# Patient Record
Sex: MALE | Race: WHITE | Employment: UNEMPLOYED | URBAN - METROPOLITAN AREA
[De-identification: names, ages, dates, MRNs, and addresses within clinical notes are randomized per-mention and may not be internally consistent; named-entity substitution may affect disease eponyms.]

---

## 2018-09-16 ENCOUNTER — HOSPITAL ENCOUNTER (INPATIENT)
Age: 48
LOS: 3 days | Discharge: HOME | DRG: 753 | End: 2018-09-20
Attending: EMERGENCY MEDICINE | Admitting: PSYCHIATRY & NEUROLOGY
Payer: MEDICAID

## 2018-09-16 DIAGNOSIS — Z00.8 EVALUATION BY PSYCHIATRIC SERVICE REQUIRED: Primary | ICD-10-CM

## 2018-09-16 PROCEDURE — 99285 EMERGENCY DEPT VISIT HI MDM: CPT

## 2018-09-17 PROBLEM — F32.A DEPRESSION: Status: ACTIVE | Noted: 2018-09-17

## 2018-09-17 LAB
ACETAMINOPHEN LEVEL: <5 MCG/ML (ref 10–30)
ALBUMIN SERPL-MCNC: 4.8 G/DL (ref 3.5–5.2)
ALP BLD-CCNC: 60 U/L (ref 40–129)
ALT SERPL-CCNC: 35 U/L (ref 0–40)
AMPHETAMINE SCREEN, URINE: NOT DETECTED
ANION GAP SERPL CALCULATED.3IONS-SCNC: 18 MMOL/L (ref 7–16)
AST SERPL-CCNC: 21 U/L (ref 0–39)
BARBITURATE SCREEN URINE: NOT DETECTED
BENZODIAZEPINE SCREEN, URINE: NOT DETECTED
BILIRUB SERPL-MCNC: 0.4 MG/DL (ref 0–1.2)
BILIRUBIN URINE: NEGATIVE
BLOOD, URINE: NEGATIVE
BUN BLDV-MCNC: 11 MG/DL (ref 6–20)
CALCIUM SERPL-MCNC: 10.4 MG/DL (ref 8.6–10.2)
CANNABINOID SCREEN URINE: NOT DETECTED
CHLORIDE BLD-SCNC: 99 MMOL/L (ref 98–107)
CLARITY: CLEAR
CO2: 22 MMOL/L (ref 22–29)
COCAINE METABOLITE SCREEN URINE: NOT DETECTED
COLOR: YELLOW
CREAT SERPL-MCNC: 0.6 MG/DL (ref 0.7–1.2)
ETHANOL: <10 MG/DL (ref 0–0.08)
GFR AFRICAN AMERICAN: >60
GFR NON-AFRICAN AMERICAN: >60 ML/MIN/1.73
GLUCOSE BLD-MCNC: 97 MG/DL (ref 74–109)
GLUCOSE URINE: NEGATIVE MG/DL
HCT VFR BLD CALC: 48.4 % (ref 37–54)
HEMOGLOBIN: 16.3 G/DL (ref 12.5–16.5)
KETONES, URINE: NEGATIVE MG/DL
LEUKOCYTE ESTERASE, URINE: NEGATIVE
MCH RBC QN AUTO: 31.5 PG (ref 26–35)
MCHC RBC AUTO-ENTMCNC: 33.7 % (ref 32–34.5)
MCV RBC AUTO: 93.6 FL (ref 80–99.9)
METHADONE SCREEN, URINE: NOT DETECTED
NITRITE, URINE: NEGATIVE
OPIATE SCREEN URINE: NOT DETECTED
PDW BLD-RTO: 13 FL (ref 11.5–15)
PH UA: 6 (ref 5–9)
PHENCYCLIDINE SCREEN URINE: NOT DETECTED
PLATELET # BLD: 235 E9/L (ref 130–450)
PMV BLD AUTO: 11.4 FL (ref 7–12)
POTASSIUM SERPL-SCNC: 3.8 MMOL/L (ref 3.5–5)
PROPOXYPHENE SCREEN: NOT DETECTED
PROTEIN UA: NEGATIVE MG/DL
RBC # BLD: 5.17 E12/L (ref 3.8–5.8)
SALICYLATE, SERUM: <0.3 MG/DL (ref 0–30)
SODIUM BLD-SCNC: 139 MMOL/L (ref 132–146)
SPECIFIC GRAVITY UA: 1.01 (ref 1–1.03)
TOTAL PROTEIN: 7.9 G/DL (ref 6.4–8.3)
TRICYCLIC ANTIDEPRESSANTS SCREEN SERUM: NEGATIVE NG/ML
UROBILINOGEN, URINE: 0.2 E.U./DL
VALPROIC ACID LEVEL: 55 MCG/ML (ref 50–100)
WBC # BLD: 12.8 E9/L (ref 4.5–11.5)

## 2018-09-17 PROCEDURE — 36415 COLL VENOUS BLD VENIPUNCTURE: CPT

## 2018-09-17 PROCEDURE — 80053 COMPREHEN METABOLIC PANEL: CPT

## 2018-09-17 PROCEDURE — 80164 ASSAY DIPROPYLACETIC ACD TOT: CPT

## 2018-09-17 PROCEDURE — 81003 URINALYSIS AUTO W/O SCOPE: CPT

## 2018-09-17 PROCEDURE — G0480 DRUG TEST DEF 1-7 CLASSES: HCPCS

## 2018-09-17 PROCEDURE — 1240000000 HC EMOTIONAL WELLNESS R&B

## 2018-09-17 PROCEDURE — 85027 COMPLETE CBC AUTOMATED: CPT

## 2018-09-17 PROCEDURE — 99222 1ST HOSP IP/OBS MODERATE 55: CPT | Performed by: PSYCHIATRY & NEUROLOGY

## 2018-09-17 PROCEDURE — 80307 DRUG TEST PRSMV CHEM ANLYZR: CPT

## 2018-09-17 RX ORDER — HALOPERIDOL 5 MG/ML
10 INJECTION INTRAMUSCULAR EVERY 6 HOURS PRN
Status: DISCONTINUED | OUTPATIENT
Start: 2018-09-17 | End: 2018-09-20 | Stop reason: HOSPADM

## 2018-09-17 RX ORDER — ACETAMINOPHEN 325 MG/1
650 TABLET ORAL EVERY 4 HOURS PRN
Status: DISCONTINUED | OUTPATIENT
Start: 2018-09-17 | End: 2018-09-20 | Stop reason: HOSPADM

## 2018-09-17 RX ORDER — BENZTROPINE MESYLATE 1 MG/ML
2 INJECTION INTRAMUSCULAR; INTRAVENOUS 2 TIMES DAILY PRN
Status: DISCONTINUED | OUTPATIENT
Start: 2018-09-17 | End: 2018-09-20 | Stop reason: HOSPADM

## 2018-09-17 RX ORDER — HYDROXYZINE PAMOATE 50 MG/1
50 CAPSULE ORAL EVERY 6 HOURS PRN
Status: DISCONTINUED | OUTPATIENT
Start: 2018-09-17 | End: 2018-09-20 | Stop reason: HOSPADM

## 2018-09-17 RX ORDER — MAGNESIUM HYDROXIDE/ALUMINUM HYDROXICE/SIMETHICONE 120; 1200; 1200 MG/30ML; MG/30ML; MG/30ML
30 SUSPENSION ORAL PRN
Status: DISCONTINUED | OUTPATIENT
Start: 2018-09-17 | End: 2018-09-20 | Stop reason: HOSPADM

## 2018-09-17 RX ORDER — TRAZODONE HYDROCHLORIDE 50 MG/1
50 TABLET ORAL NIGHTLY PRN
Status: DISCONTINUED | OUTPATIENT
Start: 2018-09-17 | End: 2018-09-20 | Stop reason: HOSPADM

## 2018-09-17 ASSESSMENT — SLEEP AND FATIGUE QUESTIONNAIRES
SLEEP PATTERN: DISTURBED/INTERRUPTED SLEEP
RESTFUL SLEEP: NO
AVERAGE NUMBER OF SLEEP HOURS: 5
DO YOU USE A SLEEP AID: NO
DIFFICULTY ARISING: NO
DIFFICULTY FALLING ASLEEP: NO
DIFFICULTY STAYING ASLEEP: YES
DO YOU HAVE DIFFICULTY SLEEPING: YES

## 2018-09-17 ASSESSMENT — LIFESTYLE VARIABLES: HISTORY_ALCOHOL_USE: NO

## 2018-09-17 ASSESSMENT — PAIN - FUNCTIONAL ASSESSMENT: PAIN_FUNCTIONAL_ASSESSMENT: 0-10

## 2018-09-17 NOTE — ED NOTES
Patient denies pain. Alert and cooperative. Remains paranoid about why he is here, what pink slip means, why he was pink slipped. Feels he is here under false pretenses. Support offered. Denies SI, HI at this time. Denies feeling hungry. Encouraged to ask for food when hungry. Suicidal precautions maintained while in hallway.         Edda Son, RN  09/17/18 4802

## 2018-09-17 NOTE — ED NOTES
Cell phone charged and given back to patient. Patients cell phone cord placed back in locker 26 with belongings.           Josefa Longo RN  09/17/18 1988

## 2018-09-17 NOTE — ED NOTES
Notified dr Amrik Cárdenas of need for admission pt accepted to [address] [place] [address] diagnosis depression unspecified. Please ensure original pinkslip/volunary admission is sent with pts soft chart. Fior to contact primary care RN with further admission details.       Gonzella Olszewski, RN  09/17/18 2138

## 2018-09-17 NOTE — PROGRESS NOTES
`[facility] [place]  Admission Note     Admission Type:   Admission Type: Involuntary    Reason for admission:  Reason for Admission: \"Girl I had sexual encounter with put me here\"    PATIENT STRENGTHS:  Strengths: Positive Support    Patient Strengths and Limitations:  Limitations: Difficulty problem solving/relies on others to help solve problems    Addictive Behavior:   Addictive Behavior  In the past 3 months, have you felt or has someone told you that you have a problem with:  : None  Do you have a history of Chemical Use?: No  Do you have a history of Alcohol Use?: No  Do you have a history of Street Drug Abuse?: No  Histroy of Prescripton Drug Abuse?: No    Medical Problems:   History reviewed. No pertinent past medical history.     Status EXAM:  Status and Exam  Normal: No  Facial Expression: Worried  Affect: Congruent  Level of Consciousness: Alert  Mood:Normal: No  Mood: Anxious, Sad  Motor Activity:Normal: No  Motor Activity: Agitated  Interview Behavior: Cooperative  Preception: [place] to Person, [place] to Time, [place] to Place, [place] to Situation  Attention:Normal: No  Attention: Distractible  Thought Processes: Circumstantial  Thought Content:Normal: No  Thought Content: Delusions  Hallucinations: None  Delusions: Yes  Delusions: Persecution, Obsessions  Memory:Normal: No  Memory: Confabulation  Insight and Judgment: No  Insight and Judgment: Poor Judgment, Poor Insight, Unrealistic  Present Suicidal Ideation: No  Present Homicidal Ideation: No    Tobacco Screening:  Practical Counseling, on admission, ross X, if applicable and completed (first 3 are required if patient doesn't refuse):            ( )  Recognizing danger situations (included triggers and roadblocks)                    ( )  Coping skills (new ways to manage stress, exercise, relaxation techniques, changing routine, distraction)                                                           ( )  Basic information about quitting

## 2018-09-17 NOTE — ED PROVIDER NOTES
Department of Emergency Medicine   ED  Provider Note  Admit Date/RoomTime: 9/16/2018 11:39 PM  ED Room: [facility]      History of Present Illness:  9/16/18, Time: 11:36 PM         Eder Everett is a 53 y.o. male presenting to the ED for suicidal ideation, beginning today. The complaint has been constant, moderate in severity, and worsened by nothing. EMS states that the patient Was reportedly suicidal. Patient reports that the people from [facility] because they were concerned for safety and they thought he was suicidal.  Pt denies suicidal ideation. Pt reports a hx of bipolar disorder and that he takes his medications. Pt denies homicidal ideation. Pt denies cough, congestion, fever, chills, N/V/D, HA, CP, SOB, abdominal pain, back pain, neck pain, LOC, syncope, urinary symptoms, constipation, or any other symptoms. Review of Systems:   Pertinent positives and negatives are stated within HPI, all other systems reviewed and are negative.    --------------------------------------------- PAST HISTORY ---------------------------------------------  Past Medical History:  has no past medical history on file. Past Surgical History:  has no past surgical history on file. Social History:  has an unknown smoking status. He has never used smokeless tobacco.    Family History: family history is not on file. The patients home medications have been reviewed. Allergies: Zyprexa [olanzapine]      ---------------------------------------------------PHYSICAL EXAM--------------------------------------    Constitutional/General: Alert and oriented x3, well appearing, non toxic in NAD  Head: Normocephalic and atraumatic  Eyes: PERRL, EOMI. Mouth: Oropharynx clear, mucous membranes moist.   Neck: Supple. Full ROM. Respiratory: Lungs clear to auscultation bilaterally, no wheezes, rales, or rhonchi. Not in respiratory distress   Cardiovascular:  Regular rate. Regular rhythm.  No murmurs, gallops, or rubs. 2+ distal pulses  GI:  Abdomen Soft, Non tender, Non distended. No rebound, guarding, or rigidity. Musculoskeletal: Moves all extremities x 4. Warm and well perfused. Integument: skin warm and dry. No rashes. Neurologic: GCS 15, 5/5 strength. Psychiatric: affect flat denies suicidal and homicidal ideation.   -------------------------------------------------- RESULTS -------------------------------------------------  I have personally reviewed all laboratory and imaging results for this patient. Results are listed below.      LABS:  Results for orders placed or performed during the hospital encounter of 09/16/18   CBC   Result Value Ref Range    WBC 12.8 (H) 4.5 - 11.5 E9/L    RBC 5.17 3.80 - 5.80 E12/L    Hemoglobin 16.3 12.5 - 16.5 g/dL    Hematocrit 48.4 37.0 - 54.0 %    MCV 93.6 80.0 - 99.9 fL    MCH 31.5 26.0 - 35.0 pg    MCHC 33.7 32.0 - 34.5 %    RDW 13.0 11.5 - 15.0 fL    Platelets 545 484 - 005 E9/L    MPV 11.4 7.0 - 12.0 fL   Comprehensive Metabolic Panel   Result Value Ref Range    Sodium 139 132 - 146 mmol/L    Potassium 3.8 3.5 - 5.0 mmol/L    Chloride 99 98 - 107 mmol/L    CO2 22 22 - 29 mmol/L    Anion Gap 18 (H) 7 - 16 mmol/L    Glucose 97 74 - 109 mg/dL    BUN 11 6 - 20 mg/dL    CREATININE 0.6 (L) 0.7 - 1.2 mg/dL    GFR Non-African American >60 >=60 mL/min/1.73    GFR African American >60     Calcium 10.4 (H) 8.6 - 10.2 mg/dL    Total Protein 7.9 6.4 - 8.3 g/dL    Alb 4.8 3.5 - 5.2 g/dL    Total Bilirubin 0.4 0.0 - 1.2 mg/dL    Alkaline Phosphatase 60 40 - 129 U/L    ALT 35 0 - 40 U/L    AST 21 0 - 39 U/L   Serum Drug Screen   Result Value Ref Range    Ethanol Lvl <10 mg/dL    Acetaminophen Level <5.0 (L) 10.0 - 72.6 mcg/mL    Salicylate, Serum <6.1 0.0 - 30.0 mg/dL    TCA Scrn NEGATIVE Cutoff:300 ng/mL   Urine Drug Screen   Result Value Ref Range    Amphetamine Screen, Urine NOT DETECTED Negative <1000 ng/mL    Barbiturate Screen, Ur NOT DETECTED Negative < 200 ng/mL    Benzodiazepine     Counseling: The emergency provider has spoken with the patient and discussed todays results, in addition to providing specific details for the plan of care and counseling regarding the diagnosis and prognosis. Questions are answered at this time and they are agreeable with the plan.     --------------------------------- IMPRESSION AND DISPOSITION ---------------------------------    IMPRESSION  1. Evaluation by psychiatric service required        DISPOSITION  Disposition:  to evaluate  Patient condition is stable    9/16/18, 11:36 PM.    This note is prepared by Adam Green, acting as Scribe for Zully Zamora MD.    Zully Zamora MD:  The scribe's documentation has been prepared under my direction and personally reviewed by me in its entirety.   I confirm that the note above accurately reflects all work, treatment, procedures, and medical decision making performed by me.      patient is medically clear      Zully Zamora MD  09/17/18 0000              Zully Zamora MD  09/17/18 [id number]

## 2018-09-18 PROBLEM — F29 PSYCHOSIS (HCC): Status: ACTIVE | Noted: 2018-09-18

## 2018-09-18 PROCEDURE — 6360000002 HC RX W HCPCS: Performed by: NURSE PRACTITIONER

## 2018-09-18 PROCEDURE — 1240000000 HC EMOTIONAL WELLNESS R&B

## 2018-09-18 PROCEDURE — 6370000000 HC RX 637 (ALT 250 FOR IP): Performed by: FAMILY MEDICINE

## 2018-09-18 PROCEDURE — 6370000000 HC RX 637 (ALT 250 FOR IP): Performed by: NURSE PRACTITIONER

## 2018-09-18 RX ORDER — CETIRIZINE HYDROCHLORIDE 10 MG/1
10 TABLET ORAL DAILY
Status: DISCONTINUED | OUTPATIENT
Start: 2018-09-18 | End: 2018-09-20 | Stop reason: HOSPADM

## 2018-09-18 RX ORDER — PANTOPRAZOLE SODIUM 40 MG/1
40 TABLET, DELAYED RELEASE ORAL
Status: DISCONTINUED | OUTPATIENT
Start: 2018-09-19 | End: 2018-09-20 | Stop reason: HOSPADM

## 2018-09-18 RX ORDER — FAMOTIDINE 20 MG/1
20 TABLET, FILM COATED ORAL DAILY
Status: DISCONTINUED | OUTPATIENT
Start: 2018-09-18 | End: 2018-09-20 | Stop reason: HOSPADM

## 2018-09-18 RX ORDER — TAMSULOSIN HYDROCHLORIDE 0.4 MG/1
0.4 CAPSULE ORAL DAILY
Status: DISCONTINUED | OUTPATIENT
Start: 2018-09-18 | End: 2018-09-20 | Stop reason: HOSPADM

## 2018-09-18 RX ORDER — TAMSULOSIN HYDROCHLORIDE 0.4 MG/1
0.4 CAPSULE ORAL DAILY
COMMUNITY
End: 2019-08-07 | Stop reason: SDUPTHER

## 2018-09-18 RX ORDER — RANITIDINE 150 MG/1
150 TABLET ORAL 2 TIMES DAILY
COMMUNITY
End: 2019-08-07 | Stop reason: SDUPTHER

## 2018-09-18 RX ORDER — OMEPRAZOLE 20 MG/1
20 CAPSULE, DELAYED RELEASE ORAL 2 TIMES DAILY
COMMUNITY
End: 2019-08-07 | Stop reason: SDUPTHER

## 2018-09-18 RX ORDER — LORATADINE 10 MG/1
10 TABLET ORAL DAILY
COMMUNITY
End: 2019-12-11 | Stop reason: SDUPTHER

## 2018-09-18 RX ORDER — DIVALPROEX SODIUM 250 MG/1
750 TABLET, DELAYED RELEASE ORAL 2 TIMES DAILY
Status: DISCONTINUED | OUTPATIENT
Start: 2018-09-18 | End: 2018-09-20 | Stop reason: HOSPADM

## 2018-09-18 RX ORDER — LISINOPRIL 20 MG/1
20 TABLET ORAL DAILY
COMMUNITY
End: 2019-08-07 | Stop reason: SDUPTHER

## 2018-09-18 RX ORDER — HYDROXYZINE PAMOATE 25 MG/1
50 CAPSULE ORAL 2 TIMES DAILY
COMMUNITY
End: 2019-12-11 | Stop reason: ALTCHOICE

## 2018-09-18 RX ORDER — BENZTROPINE MESYLATE 1 MG/1
1 TABLET ORAL 2 TIMES DAILY
Status: DISCONTINUED | OUTPATIENT
Start: 2018-09-18 | End: 2018-09-20 | Stop reason: HOSPADM

## 2018-09-18 RX ORDER — QUETIAPINE FUMARATE 300 MG/1
600 TABLET, FILM COATED ORAL NIGHTLY
Status: ON HOLD | COMMUNITY
End: 2018-09-20 | Stop reason: HOSPADM

## 2018-09-18 RX ORDER — LISINOPRIL 20 MG/1
20 TABLET ORAL DAILY
Status: DISCONTINUED | OUTPATIENT
Start: 2018-09-18 | End: 2018-09-20 | Stop reason: HOSPADM

## 2018-09-18 RX ORDER — QUETIAPINE FUMARATE 300 MG/1
600 TABLET, FILM COATED ORAL NIGHTLY
Status: DISCONTINUED | OUTPATIENT
Start: 2018-09-18 | End: 2018-09-20 | Stop reason: HOSPADM

## 2018-09-18 RX ORDER — BENZTROPINE MESYLATE 1 MG/1
1 TABLET ORAL 2 TIMES DAILY
COMMUNITY

## 2018-09-18 RX ORDER — DIVALPROEX SODIUM 250 MG/1
750 TABLET, DELAYED RELEASE ORAL 2 TIMES DAILY
Status: ON HOLD | COMMUNITY
End: 2018-09-20 | Stop reason: HOSPADM

## 2018-09-18 RX ADMIN — BENZTROPINE MESYLATE 1 MG: 1 TABLET ORAL at 20:58

## 2018-09-18 RX ADMIN — FAMOTIDINE 20 MG: 20 TABLET ORAL at 17:48

## 2018-09-18 RX ADMIN — MUPIROCIN: 20 OINTMENT TOPICAL at 21:06

## 2018-09-18 RX ADMIN — TAMSULOSIN HYDROCHLORIDE 0.4 MG: 0.4 CAPSULE ORAL at 17:48

## 2018-09-18 RX ADMIN — ACETAMINOPHEN 650 MG: 325 TABLET, FILM COATED ORAL at 20:57

## 2018-09-18 RX ADMIN — BENZTROPINE MESYLATE 2 MG: 1 INJECTION INTRAMUSCULAR; INTRAVENOUS at 13:03

## 2018-09-18 RX ADMIN — HYDROXYZINE PAMOATE 50 MG: 50 CAPSULE ORAL at 15:09

## 2018-09-18 RX ADMIN — DIVALPROEX SODIUM 750 MG: 250 TABLET, DELAYED RELEASE ORAL at 20:58

## 2018-09-18 RX ADMIN — LISINOPRIL 20 MG: 20 TABLET ORAL at 17:48

## 2018-09-18 RX ADMIN — HALOPERIDOL LACTATE 10 MG: 5 INJECTION, SOLUTION INTRAMUSCULAR at 13:03

## 2018-09-18 RX ADMIN — QUETIAPINE FUMARATE 600 MG: 300 TABLET ORAL at 20:58

## 2018-09-18 RX ADMIN — CETIRIZINE HYDROCHLORIDE 10 MG: 10 TABLET, FILM COATED ORAL at 17:48

## 2018-09-18 RX ADMIN — TRAZODONE HYDROCHLORIDE 50 MG: 50 TABLET ORAL at 20:58

## 2018-09-18 ASSESSMENT — PAIN DESCRIPTION - DESCRIPTORS: DESCRIPTORS: ACHING;DISCOMFORT;SORE

## 2018-09-18 ASSESSMENT — PAIN SCALES - GENERAL
PAINLEVEL_OUTOF10: 0
PAINLEVEL_OUTOF10: 0
PAINLEVEL_OUTOF10: 5
PAINLEVEL_OUTOF10: 0

## 2018-09-18 ASSESSMENT — PAIN DESCRIPTION - ORIENTATION: ORIENTATION: MID

## 2018-09-18 ASSESSMENT — PAIN DESCRIPTION - LOCATION: LOCATION: BACK

## 2018-09-18 ASSESSMENT — PAIN DESCRIPTION - PAIN TYPE: TYPE: ACUTE PAIN

## 2018-09-18 NOTE — CARE COORDINATION
SW was caution by nursing to wait on assessment as pt was agressive and threatening. Pt would only talk to his nurse.

## 2018-09-18 NOTE — PROGRESS NOTES
Patient verbalized to this nurse that he wants hospital advocate present this morning when he see Dr. Mook Velazquez. States, \"I have a compliant against this hospital and I have woman here that I going to nail her ass to wall. \"I have and  out of [place] also. \"

## 2018-09-18 NOTE — PLAN OF CARE
Problem: Depressive Behavior With or Without Suicide Precautions:  Goal: Absence of self-harm  Absence of self-harm   Outcome: Met This Shift  Pt resting in bed with eyes closed, no observed abnormalities. Close observations continue.

## 2018-09-18 NOTE — PROGRESS NOTES
Psychoeducation assessment was not completed as pt refused to answer questions and stated \"I am not answering any questions, talking to anyone or doing anything. \"

## 2018-09-18 NOTE — PROGRESS NOTES
Patient did not attended group as pt was pacing the floor by the nurses station and was verbally threating as to wanting discharge from hospital, irritable and paranoid.

## 2018-09-18 NOTE — H&P
Risk: [] Mild [] Moderate [x] Severe      Strengths & Weaknesses:    1. Strengths: [] Ability to communicate feelings     [] Independent ADL's     [x] Supportive Family, mom    [] Current Health Status     2. Weaknesses: [x] Emotional          [x] Motivational     MEDICATIONS: Current Facility-Administered Medications: acetaminophen (TYLENOL) tablet 650 mg, 650 mg, Oral, Q4H PRN  hydrOXYzine (VISTARIL) capsule 50 mg, 50 mg, Oral, Q6H PRN  haloperidol lactate (HALDOL) injection 10 mg, 10 mg, Intramuscular, Q6H PRN  traZODone (DESYREL) tablet 50 mg, 50 mg, Oral, Nightly PRN  benztropine mesylate (COGENTIN) injection 2 mg, 2 mg, Intramuscular, BID PRN  magnesium hydroxide (MILK OF MAGNESIA) 400 MG/5ML suspension 30 mL, 30 mL, Oral, Daily PRN  aluminum & magnesium hydroxide-simethicone (MAALOX) 200-200-20 MG/5ML suspension 30 mL, 30 mL, Oral, PRN    Medical Review of Systems:     All other than marked systmes have been reviewed and are all negative.     Constitutional Symptoms: []  fever []  Chills  Skin Symptoms: [] rash []  Pruritus   Eye Symptoms: [] Vision unchanged []  recent vision problems[] blurred vision   Respiratory Symptoms:[] shortness of breath [] cough  Cardiovascular Symptoms:  [] chest pain   [] palpitations   Gastrointestinal Symptoms: []  abdominal pain []  nausea []  vomiting []  diarrhea  Genitourinary Symptoms: []  dysuria  []  hematuria   Musculoskeletal Symptoms: []  back pain []  muscle pain []  joint pain  Neurologic Symptoms: []  headache []  dizziness  Hematolymphoid Symptoms: [] Adenopathy [] Bruises   [] Schimosis       VITALS: BP (!) 147/90   Pulse 91   Temp 98.1 °F (36.7 °C) (Temporal)   Resp 18   Ht 6' (1.829 m)   Wt 240 lb (108.9 kg)   SpO2 97%   BMI 32.55 kg/m²     ALLERGIES: Lithium; Zyprexa [olanzapine]; and Tegretol [carbamazepine]            Physical Examination:    Head:  [] Atraumatic:  [] normocephalic  Skin and Mucosa       [] Moist [] Dry [] Pale [] Normal   Neck: [] Anxious, leg and hand tremors  [x] Irritated  [] Euthymic   [x] Angry [x] Restless    Affect:  [] Congruent  [] Incongruent  [x] Labile  [x] Constricted  [x] Flat  [x] Bizarre     Thought Process and Association:  [] Logical [x] Illogical       [] Linear and Goal Directed  [x] Tangential  [] Circumstantial     Thought Content:  [] Denies [] Endorses [x] Suicidal [] Homicidal  [x] Delusional      [x] Paranoid  [] Somatic  [] Grandiose    Perception: []  None  [] Auditory   [] Visual  [] tactile   [] olfactory  [] Illusions         Insight: [] Intact  [] Fair  [x] Limited    Judgement:  [] Intact  [] Fair  [x] Limited        ASSESSMENT  Patient Active Problem List   Diagnosis    Depression     Recommendations and plan of treatment:  1- admit to inpatient unit  2- Unit [facility]   3- Medication Management I discussed risk benefits and side effects of medications. Patient is aware and willing to comply with treatment. 4- Group therapy and one on one. 5- Routine precautions  Spoke to [place] covering for guardianMaximo. Met with patient and discussed the risks and benefits associated with treatment and the patient expressed understanding. Signed:  Theresa Pimentel  9/17/2018  10:17 PM    I saw and examined the patient and I agree with the above documentation.

## 2018-09-19 PROCEDURE — 6370000000 HC RX 637 (ALT 250 FOR IP): Performed by: NURSE PRACTITIONER

## 2018-09-19 PROCEDURE — 1240000000 HC EMOTIONAL WELLNESS R&B

## 2018-09-19 PROCEDURE — 99231 SBSQ HOSP IP/OBS SF/LOW 25: CPT | Performed by: PSYCHIATRY & NEUROLOGY

## 2018-09-19 RX ADMIN — TAMSULOSIN HYDROCHLORIDE 0.4 MG: 0.4 CAPSULE ORAL at 08:55

## 2018-09-19 RX ADMIN — FAMOTIDINE 20 MG: 20 TABLET ORAL at 08:55

## 2018-09-19 RX ADMIN — DIVALPROEX SODIUM 750 MG: 250 TABLET, DELAYED RELEASE ORAL at 08:55

## 2018-09-19 RX ADMIN — BENZTROPINE MESYLATE 1 MG: 1 TABLET ORAL at 08:55

## 2018-09-19 RX ADMIN — DIVALPROEX SODIUM 750 MG: 250 TABLET, DELAYED RELEASE ORAL at 20:42

## 2018-09-19 RX ADMIN — BENZTROPINE MESYLATE 1 MG: 1 TABLET ORAL at 20:42

## 2018-09-19 RX ADMIN — HYDROXYZINE PAMOATE 50 MG: 50 CAPSULE ORAL at 08:55

## 2018-09-19 RX ADMIN — HYDROXYZINE PAMOATE 50 MG: 50 CAPSULE ORAL at 14:55

## 2018-09-19 RX ADMIN — QUETIAPINE FUMARATE 600 MG: 300 TABLET ORAL at 20:42

## 2018-09-19 RX ADMIN — LISINOPRIL 20 MG: 20 TABLET ORAL at 08:55

## 2018-09-19 RX ADMIN — CETIRIZINE HYDROCHLORIDE 10 MG: 10 TABLET, FILM COATED ORAL at 08:55

## 2018-09-19 RX ADMIN — TRAZODONE HYDROCHLORIDE 50 MG: 50 TABLET ORAL at 20:42

## 2018-09-19 RX ADMIN — MUPIROCIN: 20 OINTMENT TOPICAL at 08:59

## 2018-09-19 RX ADMIN — PANTOPRAZOLE SODIUM 40 MG: 40 TABLET, DELAYED RELEASE ORAL at 06:58

## 2018-09-19 NOTE — PROGRESS NOTES
Date: 9/19/2018  Start Time: 1100  End Time:  8513  Number of Participants:10     Type of Group: Psychotherapy     Wellness Binder Information  Module Name:  n/a  Session Number: n/a     Patient's Goal:  To increase social interaction and relationships with others.     Notes: Pt was able to provide support to group members regarding relationship with sig other and feelings of mistrust as well as acceptance.      Status After Intervention:  Unchanged     Participation Level:  Active Listener and Interactive     Participation Quality: Appropriate, Attentive, Sharing and Supportive     Speech:  normal     Thought Process/Content: Logical  Linear     Affective Functioning: Congruent     Mood: Euthymic     Level of consciousness:  Alert, Oriented x4 and Attentive     Response to Learning: Able to verbalize current knowledge/experience and Able to retain information     Endings: None Reported     Modes of Intervention: Education, Support, Socialization, Exploration, Clarifying and Problem-solving     Discipline Responsible: /Counselor

## 2018-09-19 NOTE — PROGRESS NOTES
headache []  dizziness  Hematolymphoid Symptoms: [] Adenopathy [] Bruises   [] Schimosis       Psychiatric Review of systems  Delusions:  [] Denies [] Endorses   Withdrawals:  [] Denies [] Endorses    Hallucinations: [] Denies [] Endorses    Extra Pyramidal Symptoms: [] Denies [] Endorses      /80   Pulse 81   Temp 98.2 °F (36.8 °C) (Temporal)   Resp 16   Ht 6' (1.829 m)   Wt 240 lb (108.9 kg)   SpO2 96%   BMI 32.55 kg/m²     Mental Status Examination:     Cognition:       [x] Alert  [] Awake  [x] Oriented  [x] Person  [x] Place [x] Time       [] drowsy  [] tired  [] lethargic  [] distractable  []      Attention/Concentration:   [x] Attentive  [] Distracted         Memory Recent and Remote: [] Intact   [] Impaired [] Partially Impaired      Language: [x] Able to recognize and name objects                         [] Unable to recognize and name [address] [place] [address] of Knowledge:  [] Poor [x]  Fair  [] Good     Speech: [] Normal  [] Soft  [] Slow  [x] Fast [x] Pressured                                    [] Loud [] Dysarthria  [] Incoherent        Appearance: [] Well Groomed  [] Casual Dressed  [x] Unkept  [] Disheveled                         [] Normal weight[] Thin  [] Overweight  [x] Obese           Attitude: [] Positive  [] Hostile  [] Demanding  [x] Guarded  [] Defensive                    [x] Cooperative  []  Uncooperative       Behavior:  [x] Normal Gait  [] Walks with Assistance  [] Jo-Ann Chair               [] Walks with Alessandra Douglas  [] In Hospital Bed  [] Sitting in Chair      Muscle-Skeletal:  [x] Normal Muscle Tone [] Muscle Atrophy                                        [] Abnormal Muscle Movement      Eye Contact:   [x] Good eye contact  [] Intermittent Eye Contact  [] Poor Eye Contact      Mood: [] Depressed  [x] Anxious, leg and hand tremors  [] Irritated  [] Euthymic   [] Angry [] Restless      Affect:  [] Congruent  [] Incongruent  [x] Labile  [x] Constricted  [x] Flat  [] Bizarre      Thought Process and Association:  [] Logical [] Illogical                                        [] Linear and Goal Directed  [] Tangential  [x] Circumstantial      Thought Content:  [] Denies [] Endorses [] Suicidal [] Homicidal  [x] Delusional                                       [x] Paranoid  [] Somatic  [] Grandiose      Perception: []  None  [] Auditory   [] Visual  [] tactile   [] olfactory  [] Illusions          Insight: [] Intact  [] Fair  [x] Limited    Judgement:  [] Intact  [] Fair  [x] Limited    Assessment/Plan:        Patient Active Problem List   Diagnosis Code    Depression F32.9    Psychosis ([facility] [address] F29         Plan:    []  Patient is refusing medications  [x] Improving as expected   [] Not improving as expected   [] Worsening    []  At Baseline     Continue current treatment      Reason for more than one antipsychotic:  [x] N/A  [] 3 failed monotherapy(drugs tried):  [] Cross over to a new antipsychotic  [] Taper to monotherapy from polypharmacy  [] Augmentation of Clozapine therapy due to treatment resistance to single therapy      Signed:  Stanley Valencia  9/19/2018  4:23 PM

## 2018-09-19 NOTE — CONSULTS
[facility]     Date of Admission: 9/16/2018    Chief Complaint:  Hand laceration      History Of Present Illness:      53 y.o. male who presented to [facility] presented with severe paranoia, depression and SI with a plan to jump in front of a train. . The patient denies any fever, cough, chest pain, shortness of breath, nausea and vomiting. \"Hand laceration\" sustained this AM when he was leaning against the wall and slipped  Not quite a laceration, most superficial bruising noted. ? Self inflicted. No bleeding. In clean dressing. Past Medical History:      History reviewed. No pertinent past medical history. history of schizophrenia, bipolar, paranoia, and SI. Past Surgical History:      History reviewed. No pertinent surgical history. Medications Prior to Admission:      Prior to Admission medications    Medication Sig Start Date End Date Taking?  Authorizing Provider   QUEtiapine (SEROQUEL) 300 MG tablet Take 600 mg by mouth nightly   Yes Historical Provider, MD   benztropine (COGENTIN) 1 MG tablet Take 1 mg by mouth 2 times daily   Yes Historical Provider, MD   divalproex (DEPAKOTE) 250 MG DR tablet Take 750 mg by mouth 2 times daily   Yes Historical Provider, MD   omeprazole (PRILOSEC) 20 MG delayed release capsule Take 20 mg by mouth 2 times daily   Yes Historical Provider, MD   Cholecalciferol (VITAMIN D3) 5000 units TABS Take 5,000 Units by mouth   Yes Historical Provider, MD   hydrOXYzine (VISTARIL) 25 MG capsule Take 25 mg by mouth 3 times daily as needed for Anxiety   Yes Historical Provider, MD   lisinopril (PRINIVIL;ZESTRIL) 20 MG tablet Take 20 mg by mouth daily   Yes Historical Provider, MD   ranitidine (ZANTAC) 150 MG tablet Take 150 mg by mouth daily   Yes Historical Provider, MD   tamsulosin (FLOMAX) 0.4 MG capsule Take 0.4 mg by mouth daily   Yes Historical Provider, MD   loratadine (CLARITIN) 10 MG tablet Take 10 mg by mouth daily   Yes Historical Provider, MD Results   Component Value Date    NITRU Negative 09/17/2018    WBCUA 1-3 09/22/2011    BACTERIA NONE 09/22/2011    RBCUA NONE 09/22/2011    BLOODU Negative 09/17/2018    SPECGRAV 1.015 09/17/2018    GLUCOSEU Negative 09/17/2018    GLUCOSEU NEGATIVE 09/22/2011         ASSESSMENT:    Active Hospital Problems    Diagnosis Date Noted    Psychosis ([address] Lew Franks 09/18/2018    Depression [F32.9] 09/17/2018   Abrasion of right hand. PLAN:    Mupirocin cream to hand. Dressing change every 2 days. Management as per py   Will sign off        Mary Naik MD    Thank you for the opportunity to be involved in this patient's care.

## 2018-09-20 VITALS
DIASTOLIC BLOOD PRESSURE: 86 MMHG | OXYGEN SATURATION: 95 % | HEART RATE: 68 BPM | SYSTOLIC BLOOD PRESSURE: 126 MMHG | RESPIRATION RATE: 16 BRPM | HEIGHT: 72 IN | BODY MASS INDEX: 32.51 KG/M2 | TEMPERATURE: 98.2 F | WEIGHT: 240 LBS

## 2018-09-20 PROCEDURE — 6370000000 HC RX 637 (ALT 250 FOR IP): Performed by: NURSE PRACTITIONER

## 2018-09-20 PROCEDURE — 99238 HOSP IP/OBS DSCHRG MGMT 30/<: CPT | Performed by: PSYCHIATRY & NEUROLOGY

## 2018-09-20 RX ORDER — QUETIAPINE FUMARATE 300 MG/1
600 TABLET, FILM COATED ORAL NIGHTLY
Qty: 60 TABLET | Refills: 3 | Status: SHIPPED | OUTPATIENT
Start: 2018-09-20 | End: 2019-10-15

## 2018-09-20 RX ORDER — DIVALPROEX SODIUM 250 MG/1
750 TABLET, DELAYED RELEASE ORAL 2 TIMES DAILY
Qty: 90 TABLET | Refills: 3 | Status: SHIPPED | OUTPATIENT
Start: 2018-09-20 | End: 2019-12-11

## 2018-09-20 RX ADMIN — TAMSULOSIN HYDROCHLORIDE 0.4 MG: 0.4 CAPSULE ORAL at 08:49

## 2018-09-20 RX ADMIN — LISINOPRIL 20 MG: 20 TABLET ORAL at 08:49

## 2018-09-20 RX ADMIN — HYDROXYZINE PAMOATE 50 MG: 50 CAPSULE ORAL at 08:49

## 2018-09-20 RX ADMIN — MUPIROCIN: 20 OINTMENT TOPICAL at 08:51

## 2018-09-20 RX ADMIN — PANTOPRAZOLE SODIUM 40 MG: 40 TABLET, DELAYED RELEASE ORAL at 06:48

## 2018-09-20 RX ADMIN — DIVALPROEX SODIUM 750 MG: 250 TABLET, DELAYED RELEASE ORAL at 08:49

## 2018-09-20 RX ADMIN — CETIRIZINE HYDROCHLORIDE 10 MG: 10 TABLET, FILM COATED ORAL at 08:49

## 2018-09-20 RX ADMIN — FAMOTIDINE 20 MG: 20 TABLET ORAL at 08:49

## 2018-09-20 RX ADMIN — BENZTROPINE MESYLATE 1 MG: 1 TABLET ORAL at 08:51

## 2018-09-20 NOTE — PROGRESS NOTES
[facility]  Discharge Note    Pt discharged with followings belongings:   Dentures: None  Vision - Corrective Lenses: None  Hearing Aid: None  Jewelry: None  Body Piercings Removed: N/A  Clothing: Pants, Shirt, Socks, Other (Comment), Footwear (leather belt)  Were All Patient Medications Collected?: Not Applicable  Other Valuables: Cell phone, CellTran, Other (Comment) (phone number book, pack of small cigars, wallet  contains , two lighters, watch.)   Valuables sent home with patient. Patient left department with Departure Mode: By self, In cab via Mobility at Departure: Ambulatory, discharged to Discharged to: Private Residence. Patient education on aftercare instructions: yes   Patient verbalize understanding of AVS:  yes.     Status EXAM upon discharge:  Status and Exam  Normal: Yes  Facial Expression: Brightened  Affect: Appropriate  Level of Consciousness: Alert  Mood:Normal: Yes  Mood: Anxious  Motor Activity:Normal: Yes  Motor Activity: Agitated  Interview Behavior: Cooperative  Preception: [place] to Person, Erleen Cecilia to Time, [place] to Place, [place] to Situation  Attention:Normal: Yes  Attention: Unable to Concentrate  Thought Processes:  (wnl)  Thought Content:Normal: Yes  Thought Content: Paranoia, Obsessions, Delusions  Hallucinations: None  Delusions: No  Delusions: Obsessions  Memory:Normal: No  Memory: Confabulation  Insight and Judgment: No  Insight and Judgment: Poor Judgment  Present Suicidal Ideation: No  Present Homicidal Ideation: No    Dot MIRIAM Corado

## 2018-09-20 NOTE — PLAN OF CARE
Problem: Depressive Behavior With or Without Suicide Precautions:  Goal: Able to verbalize and/or display a decrease in depressive symptoms  Able to verbalize and/or display a decrease in depressive symptoms   Outcome: Met This Shift    Goal: Ability to disclose and discuss suicidal ideas will improve  Ability to disclose and discuss suicidal ideas will improve   Outcome: Met This Shift

## 2018-09-20 NOTE — PROGRESS NOTES
In order to ensure appropriate transition and discharge planning is in place, the following documents have been transmitted to [address] [place] [address], as the outpatient provider:     · The d/c diagnosis was transmitted to the next care provider  · The reason for hospitalization was transmitted to the next care provider  · The d/c medications (dosage and indication) were transmitted to the next care provider   · The continuing care plan was transmitted to the next care provider

## 2018-09-20 NOTE — CARE COORDINATION
SW received call from pt's guardian Opal Soni at [facility] regarding pt's case, SW placed call to him and left vm. SW attempted to establish follow-up appointments with [facility] but was informed per  of note to not schedule pt follow-up appointments. SW to speak with pt's guardian regarding alternative outpatient treatment facility.

## 2018-09-20 NOTE — DISCHARGE SUMMARY
Physician Discharge Summary      Physical Examination   VS/Measurements:     /86   Pulse 68   Temp 98.2 °F (36.8 °C) (Oral)   Resp 16   Ht 6' (1.829 m)   Wt 240 lb (108.9 kg)   SpO2 95%   BMI 32.55 kg/m²         Discharge Medications:                    Medication List      CONTINUE taking these medications    benztropine 1 MG tablet  Commonly known as:  COGENTIN     divalproex 250 MG DR tablet  Commonly known as:  DEPAKOTE  Take 3 tablets by mouth 2 times daily     hydrOXYzine 25 MG capsule  Commonly known as:  VISTARIL     lisinopril 20 MG tablet  Commonly known as:  PRINIVIL;ZESTRIL     loratadine 10 MG tablet  Commonly known as:  CLARITIN     omeprazole 20 MG delayed release capsule  Commonly known as:  PRILOSEC     QUEtiapine 300 MG tablet  Commonly known as:  SEROQUEL  Take 2 tablets by mouth nightly     tamsulosin 0.4 MG capsule  Commonly known as:  FLOMAX     Vitamin D3 5000 units Tabs     ZANTAC 150 MG tablet  Generic drug:  ranitidine           Where to Get Your Medications      These medications were sent to [facility], [place] [address], [place] [address]    Phone:  [phone]   · divalproex 250 MG DR tablet  · QUEtiapine 300 MG tablet          Psychiatric: Mental Status Examination:    Cognition:      [x] Alert  [x] Awake  [x] Oriented  [x] Person  [x] Place [x] Time    [] drowsy  [] tired  [] lethargic  [] distractable       Attention/Concentration:   [] Attentive  [] Distracted        Memory Recent and Remote: [] Intact   [] Impaired [] Partially Impaired     Language: [] Able to recognize and name objects          [] Unable to recognize and name Objects    Fund of Knowledge:  [] Poor []  Fair  [] Good    Speech: [x] Normal  [] Soft  [] Slow  [] Fast [] Pressured            [] Loud [] Dysarthria  [] Incoherent       Appearance: [] Well Groomed  [x] Casual Dressed  [] Unkept  [] Disheveled          [] Normal weight[] Thin  [] Overweight  [] Obese           Attitude: [] Positive  [] Hostile  [] Demanding  [] Guarded  [] Defensive         [x] Cooperative  []  Uncooperative      Behavior:  [x] Normal Gait  [] Walks with Assistance  [] Kallie Lynn    [] Walks with Florencio Karan  [] In Hospital Bed  [] Sitting in Chair    Muscle-Skeletal:  [x] Normal Muscle Tone [] Muscle Atrophy       [] Abnormal Muscle Movement     Eye Contact:  [x] Good eye contact  [] Intermittent Eye Contact  [] Poor Eye Contact     Mood: [] Depressed  [] Anxious  [] Irritated  [x] Euthymic   [] Angry [] Restless    Affect:  [x] Congruent  [] Incongruent  [] Labile  [] Constricted  [] Flat  [] Bizarre     Thought Process and Association:  [] Logical [] Illogical       [x] Linear and Goal Directed  [] Tangential  [] Circumstantial     Thought Content:  [x] Denies [] Endorses [] Suicidal [] Homicidal  [] Delusional      [] Paranoid  [] Somatic  [] Grandiose    Perception: [x]  None  [] Auditory   [] Visual  [] tactile   [] olfactory  [] Illusions         Insight: [] Intact  [x] Fair  [] Limited    Judgement:  [] Intact  [x] Fair  [] Limited    Hospital Course:   Admit Date: 9/16/2018     Discharge Date: 9/20/2018  Admitted from:  [x]  Emergency Room  []  Home  []  Another facility   []  [facility]     Admitting diagnosis:   Patient Active Problem List   Diagnosis    Depression    Psychosis ([facility] [address]      Length of stay:  3 days              Korey Womack was admitted in Psychiatric unit  from ER with psychosis. Patient was treated            With the above . Patient responded well to the treatment.      Discharge Summary Plan:     Discharge Status:    [x] Improved [] Unchanged    [] Worse       Discharge instructions given:  [x] Patient    [] Family [] Other         Discharge disposition:  [x] Home [] Step Down unit  [] Group Home []  [facility]                                                    [] [facility]    [] AMA  [] Other

## 2018-09-20 NOTE — PROGRESS NOTES
Patient is bright and social. He reported that he is feeling better. He is calm and cooperative, he expressed that he feels that the medication is working. He denies any S/I, no A/V hallucinations.

## 2019-08-07 ENCOUNTER — HOSPITAL ENCOUNTER (OUTPATIENT)
Age: 49
Discharge: HOME | End: 2019-08-09
Payer: MEDICAID

## 2019-08-07 ENCOUNTER — TELEPHONE (OUTPATIENT)
Dept: PRIMARY CARE CLINIC | Age: 49
End: 2019-08-07

## 2019-08-07 ENCOUNTER — OFFICE VISIT (OUTPATIENT)
Dept: PRIMARY CARE CLINIC | Age: 49
End: 2019-08-07
Payer: MEDICAID

## 2019-08-07 VITALS
BODY MASS INDEX: 28.88 KG/M2 | HEIGHT: 74 IN | TEMPERATURE: 98.5 F | HEART RATE: 91 BPM | RESPIRATION RATE: 18 BRPM | WEIGHT: 225 LBS | DIASTOLIC BLOOD PRESSURE: 70 MMHG | SYSTOLIC BLOOD PRESSURE: 118 MMHG | OXYGEN SATURATION: 97 %

## 2019-08-07 DIAGNOSIS — K21.9 GASTROESOPHAGEAL REFLUX DISEASE, ESOPHAGITIS PRESENCE NOT SPECIFIED: ICD-10-CM

## 2019-08-07 DIAGNOSIS — I10 ESSENTIAL HYPERTENSION: ICD-10-CM

## 2019-08-07 DIAGNOSIS — G62.9 NEUROPATHY: ICD-10-CM

## 2019-08-07 DIAGNOSIS — F31.9 BIPOLAR AFFECTIVE DISORDER, REMISSION STATUS UNSPECIFIED (HCC): ICD-10-CM

## 2019-08-07 DIAGNOSIS — N40.0 BENIGN PROSTATIC HYPERPLASIA, UNSPECIFIED WHETHER LOWER URINARY TRACT SYMPTOMS PRESENT: ICD-10-CM

## 2019-08-07 DIAGNOSIS — I10 ESSENTIAL HYPERTENSION: Primary | ICD-10-CM

## 2019-08-07 LAB
ALBUMIN SERPL-MCNC: 4.8 G/DL (ref 3.5–5.2)
ALP BLD-CCNC: 69 U/L (ref 40–129)
ALT SERPL-CCNC: 20 U/L (ref 0–40)
ANION GAP SERPL CALCULATED.3IONS-SCNC: 15 MMOL/L (ref 7–16)
AST SERPL-CCNC: 13 U/L (ref 0–39)
BASOPHILS ABSOLUTE: 0.08 E9/L (ref 0–0.2)
BASOPHILS RELATIVE PERCENT: 0.7 % (ref 0–2)
BILIRUB SERPL-MCNC: 0.2 MG/DL (ref 0–1.2)
BUN BLDV-MCNC: 12 MG/DL (ref 6–20)
CALCIUM SERPL-MCNC: 10.2 MG/DL (ref 8.6–10.2)
CHLORIDE BLD-SCNC: 97 MMOL/L (ref 98–107)
CHOLESTEROL, TOTAL: 197 MG/DL (ref 0–199)
CO2: 27 MMOL/L (ref 22–29)
CREAT SERPL-MCNC: 0.8 MG/DL (ref 0.7–1.2)
EOSINOPHILS ABSOLUTE: 0.31 E9/L (ref 0.05–0.5)
EOSINOPHILS RELATIVE PERCENT: 2.7 % (ref 0–6)
GFR AFRICAN AMERICAN: >60
GFR NON-AFRICAN AMERICAN: >60 ML/MIN/1.73
GLUCOSE BLD-MCNC: 99 MG/DL (ref 74–99)
HCT VFR BLD CALC: 44.3 % (ref 37–54)
HDLC SERPL-MCNC: 48 MG/DL
HEMOGLOBIN: 15 G/DL (ref 12.5–16.5)
IMMATURE GRANULOCYTES #: 0.05 E9/L
IMMATURE GRANULOCYTES %: 0.4 % (ref 0–5)
LDL CHOLESTEROL CALCULATED: 125 MG/DL (ref 0–99)
LYMPHOCYTES ABSOLUTE: 4.41 E9/L (ref 1.5–4)
LYMPHOCYTES RELATIVE PERCENT: 38.9 % (ref 20–42)
MCH RBC QN AUTO: 31.9 PG (ref 26–35)
MCHC RBC AUTO-ENTMCNC: 33.9 % (ref 32–34.5)
MCV RBC AUTO: 94.3 FL (ref 80–99.9)
MONOCYTES ABSOLUTE: 0.81 E9/L (ref 0.1–0.95)
MONOCYTES RELATIVE PERCENT: 7.1 % (ref 2–12)
NEUTROPHILS ABSOLUTE: 5.68 E9/L (ref 1.8–7.3)
NEUTROPHILS RELATIVE PERCENT: 50.2 % (ref 43–80)
PDW BLD-RTO: 12.8 FL (ref 11.5–15)
PLATELET # BLD: 249 E9/L (ref 130–450)
PMV BLD AUTO: 11.4 FL (ref 7–12)
POTASSIUM SERPL-SCNC: 4.4 MMOL/L (ref 3.5–5)
RBC # BLD: 4.7 E12/L (ref 3.8–5.8)
SODIUM BLD-SCNC: 139 MMOL/L (ref 132–146)
TOTAL PROTEIN: 7.4 G/DL (ref 6.4–8.3)
TRIGL SERPL-MCNC: 122 MG/DL (ref 0–149)
TSH SERPL DL<=0.05 MIU/L-ACNC: 1.19 UIU/ML (ref 0.27–4.2)
VALPROIC ACID LEVEL: 81 MCG/ML (ref 50–100)
VITAMIN B-12: 397 PG/ML (ref 211–946)
VLDLC SERPL CALC-MCNC: 24 MG/DL
WBC # BLD: 11.3 E9/L (ref 4.5–11.5)

## 2019-08-07 PROCEDURE — 84443 ASSAY THYROID STIM HORMONE: CPT

## 2019-08-07 PROCEDURE — 85025 COMPLETE CBC W/AUTO DIFF WBC: CPT

## 2019-08-07 PROCEDURE — 82607 VITAMIN B-12: CPT

## 2019-08-07 PROCEDURE — 80053 COMPREHEN METABOLIC PANEL: CPT

## 2019-08-07 PROCEDURE — 80061 LIPID PANEL: CPT

## 2019-08-07 PROCEDURE — 80164 ASSAY DIPROPYLACETIC ACD TOT: CPT

## 2019-08-07 PROCEDURE — 99204 OFFICE O/P NEW MOD 45 MIN: CPT | Performed by: FAMILY MEDICINE

## 2019-08-07 PROCEDURE — 36415 COLL VENOUS BLD VENIPUNCTURE: CPT

## 2019-08-07 RX ORDER — LISINOPRIL 20 MG/1
20 TABLET ORAL DAILY
Qty: 90 TABLET | Refills: 2 | Status: SHIPPED | OUTPATIENT
Start: 2019-08-07 | End: 2019-08-07 | Stop reason: SDUPTHER

## 2019-08-07 RX ORDER — OMEPRAZOLE 20 MG/1
20 CAPSULE, DELAYED RELEASE ORAL 2 TIMES DAILY
Qty: 60 CAPSULE | Refills: 2 | Status: SHIPPED | OUTPATIENT
Start: 2019-08-07 | End: 2019-12-11

## 2019-08-07 RX ORDER — TAMSULOSIN HYDROCHLORIDE 0.4 MG/1
0.4 CAPSULE ORAL DAILY
Qty: 90 CAPSULE | Refills: 2 | Status: SHIPPED | OUTPATIENT
Start: 2019-08-07 | End: 2019-08-07 | Stop reason: SDUPTHER

## 2019-08-07 RX ORDER — LISINOPRIL 20 MG/1
20 TABLET ORAL DAILY
Qty: 90 TABLET | Refills: 2 | Status: SHIPPED
Start: 2019-08-07 | End: 2020-05-04 | Stop reason: SDUPTHER

## 2019-08-07 RX ORDER — GABAPENTIN 400 MG/1
400 CAPSULE ORAL 3 TIMES DAILY
COMMUNITY
End: 2019-09-04 | Stop reason: SDUPTHER

## 2019-08-07 RX ORDER — MONTELUKAST SODIUM 10 MG/1
10 TABLET ORAL NIGHTLY
Qty: 90 TABLET | Refills: 2 | Status: SHIPPED
Start: 2019-08-07 | End: 2020-05-04 | Stop reason: SDUPTHER

## 2019-08-07 RX ORDER — HALOPERIDOL 5 MG
5 TABLET ORAL DAILY
Status: ON HOLD | COMMUNITY
End: 2021-04-12

## 2019-08-07 RX ORDER — RANITIDINE 150 MG/1
150 TABLET ORAL 2 TIMES DAILY
Qty: 340 TABLET | Refills: 2 | Status: SHIPPED | OUTPATIENT
Start: 2019-08-07 | End: 2019-08-07 | Stop reason: SDUPTHER

## 2019-08-07 RX ORDER — TAMSULOSIN HYDROCHLORIDE 0.4 MG/1
0.4 CAPSULE ORAL DAILY
Qty: 90 CAPSULE | Refills: 2 | Status: SHIPPED | OUTPATIENT
Start: 2019-08-07

## 2019-08-07 RX ORDER — MONTELUKAST SODIUM 10 MG/1
10 TABLET ORAL NIGHTLY
COMMUNITY
End: 2019-08-07 | Stop reason: SDUPTHER

## 2019-08-07 RX ORDER — RANITIDINE 150 MG/1
150 TABLET ORAL 2 TIMES DAILY
Qty: 340 TABLET | Refills: 2 | Status: SHIPPED | OUTPATIENT
Start: 2019-08-07 | End: 2019-12-11 | Stop reason: ALTCHOICE

## 2019-08-07 RX ORDER — OMEPRAZOLE 20 MG/1
20 CAPSULE, DELAYED RELEASE ORAL 2 TIMES DAILY
Qty: 60 CAPSULE | Refills: 2 | Status: SHIPPED | OUTPATIENT
Start: 2019-08-07 | End: 2019-08-07 | Stop reason: SDUPTHER

## 2019-08-07 RX ORDER — MONTELUKAST SODIUM 10 MG/1
10 TABLET ORAL NIGHTLY
Qty: 90 TABLET | Refills: 2 | Status: SHIPPED | OUTPATIENT
Start: 2019-08-07 | End: 2019-08-07 | Stop reason: SDUPTHER

## 2019-08-07 NOTE — PROGRESS NOTES
2019     [facility]    : 1970 Sex: male   Age: 53 y.o. Chief Complaint   Patient presents with    Established New Doctor       HPI: This 53y.o. -year-old male Fahad Bryson today to establish as a new patient. Past medical history includes hypertension, allergies, GERD, BPH and bipolar disorder. Social history is negative for tobacco or drug use or abuse. The patient does admit to occasional alcohol use. Family history is significant for diabetes, cardiovascular disease and hypertension. Patient presents today with a complaint of numbness and tingling of both upper and lower extremities. Current medication list reviewed. The patient is tolerating all medications well without adverse events or known side effects. The patient does understand the risk and benefits of the prescribed medications. The patient is up-to-date on all age-appropriate wellness issues. ROS:   Const: Denies changes in appetite, chills, fever, night sweats and weight loss. Eyes:  Denies discharge, a recent change in visual acuity, blurred vision and double vision. ENMT: Denies discharge of the ears, hearing loss, pain of the ears. Denies nasal or sinus symptoms other than stated above. Denies mouth or throat symptoms. CV:  Denies chest pain, dyspnea on exertion, orthopnea, palpitations and PND  Resp: Denies chest pain, cough, SOB and wheezing. GI: Denies abdominal pain, constipation, diarrhea, heartburn, indigestion, nausea and vomiting. : Denies dysuria, frequency, hematuria, nocturia and urgency. Musculo: Denies arthralgias and myalgia  Skin:  Denies lesions, pruritus and rash. Neuro: Denies dizziness, lightheadedness, numbness, tingling and weakness. Psych:  Denies anxiety and depression  Endocrine: Denies anxiety and depression. Hema/Lymph: Denies hematologic symptoms  Allergy/Immuno:  Denies allergic/immunologic symptoms.   Pertinent positives reviewed and noted      Current Outpatient Medications:    gabapentin (NEURONTIN) 400 MG capsule, Take 400 mg by mouth 3 times daily. , Disp: , Rfl:     haloperidol (HALDOL) 5 MG tablet, Take 5 mg by mouth daily, Disp: , Rfl:     lisinopril (PRINIVIL;ZESTRIL) 20 MG tablet, Take 1 tablet by mouth daily, Disp: 90 tablet, Rfl: 2    omeprazole (PRILOSEC) 20 MG delayed release capsule, Take 1 capsule by mouth 2 times daily, Disp: 60 capsule, Rfl: 2    ranitidine (ZANTAC) 150 MG tablet, Take 1 tablet by mouth 2 times daily, Disp: 340 tablet, Rfl: 2    tamsulosin (FLOMAX) 0.4 MG capsule, Take 1 capsule by mouth daily, Disp: 90 capsule, Rfl: 2    montelukast (SINGULAIR) 10 MG tablet, Take 1 tablet by mouth nightly, Disp: 90 tablet, Rfl: 2    divalproex (DEPAKOTE) 250 MG DR tablet, Take 3 tablets by mouth 2 times daily (Patient taking differently: Take 500 mg by mouth 2 times daily ), Disp: 90 tablet, Rfl: 3    QUEtiapine (SEROQUEL) 300 MG tablet, Take 2 tablets by mouth nightly, Disp: 60 tablet, Rfl: 3    hydrOXYzine (VISTARIL) 25 MG capsule, Take 50 mg by mouth 2 times daily , Disp: , Rfl:     benztropine (COGENTIN) 1 MG tablet, Take 1 mg by mouth 2 times daily, Disp: , Rfl:     Cholecalciferol (VITAMIN D3) 5000 units TABS, Take 5,000 Units by mouth, Disp: , Rfl:     loratadine (CLARITIN) 10 MG tablet, Take 10 mg by mouth daily, Disp: , Rfl:     Allergies   Allergen Reactions    Lithium Swelling    Wellbutrin [Bupropion] Other (See Comments)     shaking    Zyprexa [Olanzapine]     Tegretol [Carbamazepine] Nausea And Vomiting       No past medical history on file.   Social History     Socioeconomic History    Marital status: Single     Spouse name: Not on file    Number of children: Not on file    Years of education: Not on file    Highest education level: Not on file   Occupational History    Not on file   Social Needs    Financial resource strain: Not on file    Food insecurity:     Worry: Not on file     Inability: Not on file    Transportation needs: Medical: Not on file     Non-medical: Not on file   Tobacco Use    Smoking status: Current Every Day Smoker     Packs/day: 1.50     Years: 9.00     Pack years: 13.50     Types: Cigarettes     Start date: 2/18/2000    Smokeless tobacco: Never Used   Substance and Sexual Activity    Alcohol use: Not on file    Drug use: Not on file    Sexual activity: Not on file   Lifestyle    Physical activity:     Days per week: Not on file     Minutes per session: Not on file    Stress: Not on file   Relationships    Social connections:     Talks on phone: Not on file     Gets together: Not on file     Attends Congregational service: Not on file     Active member of club or organization: Not on file     Attends meetings of clubs or organizations: Not on file     Relationship status: Not on file    Intimate partner violence:     Fear of current or ex partner: Not on file     Emotionally abused: Not on file     Physically abused: Not on file     Forced sexual activity: Not on file   Other Topics Concern    Not on file   Social History Narrative    Not on file     No past surgical history on file. No family history on file. Vitals:    08/07/19 0925   BP: 118/70   Pulse: 91   Resp: 18   Temp: 98.5 °F (36.9 °C)   SpO2: 97%   Weight: 225 lb (102.1 kg)   Height: 6' 2\" (1.88 m)        Exam: Const: Appears healthy and well developed. No signs of acute distress present. Eyes: PERRL  ENMT: Tympanic membranes are intact. Nasal mucosa intact without noted erythema Septum is in the midline. Posterior pharynx shows no exudate, irritation or redness. Neck:  Supple without adenopathy. Adequate range of motion   Resp: No rales, rhonchi, wheezes appreciated over the lungs bilaterally. CV: S1, S2 within normal limits. Regular rate and rhythm noted. Without murmur, gallop or rub. Extremities:  Pulses intact. Without noted edema. Abdomen: Positive bowel sounds.   Palpation reveals softness, with no distension, organomegaly or

## 2019-09-04 RX ORDER — GABAPENTIN 400 MG/1
400 CAPSULE ORAL 4 TIMES DAILY
Qty: 120 CAPSULE | Refills: 2 | Status: SHIPPED | OUTPATIENT
Start: 2019-09-04 | End: 2020-01-07 | Stop reason: SDUPTHER

## 2019-09-04 RX ORDER — PROPRANOLOL HYDROCHLORIDE 10 MG/1
TABLET ORAL
COMMUNITY
Start: 2019-08-21 | End: 2019-09-04 | Stop reason: SDUPTHER

## 2019-09-04 RX ORDER — PROPRANOLOL HYDROCHLORIDE 10 MG/1
10 TABLET ORAL 3 TIMES DAILY
Qty: 90 TABLET | Refills: 2 | Status: SHIPPED | OUTPATIENT
Start: 2019-09-04 | End: 2019-09-20 | Stop reason: SDUPTHER

## 2019-09-19 RX ORDER — PROPRANOLOL HYDROCHLORIDE 10 MG/1
15 TABLET ORAL 3 TIMES DAILY
Qty: 90 TABLET | Refills: 3 | Status: CANCELLED | OUTPATIENT
Start: 2019-09-19 | End: 2019-10-19

## 2019-09-20 RX ORDER — PROPRANOLOL HYDROCHLORIDE 10 MG/1
10 TABLET ORAL 3 TIMES DAILY
Qty: 90 TABLET | Refills: 2 | Status: SHIPPED | OUTPATIENT
Start: 2019-09-20 | End: 2019-10-08 | Stop reason: SDUPTHER

## 2019-10-08 RX ORDER — PROPRANOLOL HYDROCHLORIDE 10 MG/1
TABLET ORAL
Qty: 135 TABLET | Refills: 0 | Status: SHIPPED | OUTPATIENT
Start: 2019-10-08 | End: 2019-11-07 | Stop reason: SDUPTHER

## 2019-10-09 ENCOUNTER — OFFICE VISIT (OUTPATIENT)
Dept: PODIATRY | Age: 49
End: 2019-10-09
Payer: MEDICAID

## 2019-10-09 ENCOUNTER — OFFICE VISIT (OUTPATIENT)
Dept: FAMILY MEDICINE CLINIC | Age: 49
End: 2019-10-09
Payer: MEDICAID

## 2019-10-09 VITALS
HEIGHT: 72 IN | HEART RATE: 86 BPM | WEIGHT: 220 LBS | TEMPERATURE: 96.9 F | BODY MASS INDEX: 29.8 KG/M2 | OXYGEN SATURATION: 98 %

## 2019-10-09 VITALS
OXYGEN SATURATION: 97 % | HEART RATE: 86 BPM | WEIGHT: 242 LBS | TEMPERATURE: 97.8 F | BODY MASS INDEX: 32.78 KG/M2 | DIASTOLIC BLOOD PRESSURE: 88 MMHG | HEIGHT: 72 IN | SYSTOLIC BLOOD PRESSURE: 125 MMHG

## 2019-10-09 DIAGNOSIS — L84 CORNS AND CALLOSITIES: ICD-10-CM

## 2019-10-09 DIAGNOSIS — M54.31 SCIATICA OF RIGHT SIDE: Primary | ICD-10-CM

## 2019-10-09 DIAGNOSIS — L85.3 XEROSIS CUTIS: ICD-10-CM

## 2019-10-09 DIAGNOSIS — B35.1 TINEA UNGUIUM: Primary | ICD-10-CM

## 2019-10-09 DIAGNOSIS — G60.8 HEREDITARY SENSORY NEUROPATHY: ICD-10-CM

## 2019-10-09 PROCEDURE — 4004F PT TOBACCO SCREEN RCVD TLK: CPT | Performed by: PHYSICIAN ASSISTANT

## 2019-10-09 PROCEDURE — G8419 CALC BMI OUT NRM PARAM NOF/U: HCPCS | Performed by: PHYSICIAN ASSISTANT

## 2019-10-09 PROCEDURE — 4004F PT TOBACCO SCREEN RCVD TLK: CPT | Performed by: PODIATRIST

## 2019-10-09 PROCEDURE — G8417 CALC BMI ABV UP PARAM F/U: HCPCS | Performed by: PODIATRIST

## 2019-10-09 PROCEDURE — 99213 OFFICE O/P EST LOW 20 MIN: CPT | Performed by: PHYSICIAN ASSISTANT

## 2019-10-09 PROCEDURE — G8484 FLU IMMUNIZE NO ADMIN: HCPCS | Performed by: PHYSICIAN ASSISTANT

## 2019-10-09 PROCEDURE — G8427 DOCREV CUR MEDS BY ELIG CLIN: HCPCS | Performed by: PHYSICIAN ASSISTANT

## 2019-10-09 PROCEDURE — 11721 DEBRIDE NAIL 6 OR MORE: CPT | Performed by: PODIATRIST

## 2019-10-09 PROCEDURE — 99203 OFFICE O/P NEW LOW 30 MIN: CPT | Performed by: PODIATRIST

## 2019-10-09 PROCEDURE — G8484 FLU IMMUNIZE NO ADMIN: HCPCS | Performed by: PODIATRIST

## 2019-10-09 PROCEDURE — G8427 DOCREV CUR MEDS BY ELIG CLIN: HCPCS | Performed by: PODIATRIST

## 2019-10-09 PROCEDURE — 11056 PARNG/CUTG B9 HYPRKR LES 2-4: CPT | Performed by: PODIATRIST

## 2019-10-09 RX ORDER — AMMONIUM LACTATE 12 G/100G
LOTION TOPICAL
Qty: 400 G | Refills: 2 | Status: ON HOLD
Start: 2019-10-09 | End: 2021-04-12

## 2019-10-09 RX ORDER — CHOLECALCIFEROL (VITAMIN D3) 1250 MCG
CAPSULE ORAL
COMMUNITY
End: 2019-10-09

## 2019-10-09 RX ORDER — PREDNISONE 10 MG/1
TABLET ORAL
Qty: 20 TABLET | Refills: 0 | Status: SHIPPED | OUTPATIENT
Start: 2019-10-09 | End: 2019-10-17

## 2019-10-09 RX ORDER — TRAMADOL HYDROCHLORIDE 50 MG/1
50 TABLET ORAL EVERY 4 HOURS PRN
Qty: 12 TABLET | Refills: 0 | Status: SHIPPED | OUTPATIENT
Start: 2019-10-09 | End: 2019-10-12

## 2019-10-15 ENCOUNTER — OFFICE VISIT (OUTPATIENT)
Dept: PRIMARY CARE CLINIC | Age: 49
End: 2019-10-15
Payer: MEDICAID

## 2019-10-15 ENCOUNTER — TELEPHONE (OUTPATIENT)
Dept: PRIMARY CARE CLINIC | Age: 49
End: 2019-10-15

## 2019-10-15 VITALS
DIASTOLIC BLOOD PRESSURE: 64 MMHG | BODY MASS INDEX: 32.08 KG/M2 | WEIGHT: 250 LBS | OXYGEN SATURATION: 99 % | HEIGHT: 74 IN | SYSTOLIC BLOOD PRESSURE: 118 MMHG | HEART RATE: 88 BPM

## 2019-10-15 DIAGNOSIS — K21.9 GASTROESOPHAGEAL REFLUX DISEASE, ESOPHAGITIS PRESENCE NOT SPECIFIED: ICD-10-CM

## 2019-10-15 DIAGNOSIS — Z23 ENCOUNTER FOR IMMUNIZATION: ICD-10-CM

## 2019-10-15 DIAGNOSIS — I10 ESSENTIAL HYPERTENSION: Primary | ICD-10-CM

## 2019-10-15 DIAGNOSIS — N40.0 BENIGN PROSTATIC HYPERPLASIA, UNSPECIFIED WHETHER LOWER URINARY TRACT SYMPTOMS PRESENT: ICD-10-CM

## 2019-10-15 PROCEDURE — G8417 CALC BMI ABV UP PARAM F/U: HCPCS | Performed by: FAMILY MEDICINE

## 2019-10-15 PROCEDURE — 90686 IIV4 VACC NO PRSV 0.5 ML IM: CPT | Performed by: FAMILY MEDICINE

## 2019-10-15 PROCEDURE — 90471 IMMUNIZATION ADMIN: CPT | Performed by: FAMILY MEDICINE

## 2019-10-15 PROCEDURE — G8427 DOCREV CUR MEDS BY ELIG CLIN: HCPCS | Performed by: FAMILY MEDICINE

## 2019-10-15 PROCEDURE — 99214 OFFICE O/P EST MOD 30 MIN: CPT | Performed by: FAMILY MEDICINE

## 2019-10-15 PROCEDURE — G8482 FLU IMMUNIZE ORDER/ADMIN: HCPCS | Performed by: FAMILY MEDICINE

## 2019-10-15 PROCEDURE — 4004F PT TOBACCO SCREEN RCVD TLK: CPT | Performed by: FAMILY MEDICINE

## 2019-10-15 RX ORDER — ARIPIPRAZOLE LAUROXIL 1064 MG/3.9ML
1 INJECTION, SUSPENSION, EXTENDED RELEASE INTRAMUSCULAR
Refills: 0 | Status: ON HOLD | COMMUNITY
Start: 2019-10-11 | End: 2021-04-12 | Stop reason: CLARIF

## 2019-10-15 RX ORDER — QUETIAPINE FUMARATE 400 MG/1
300 TABLET, FILM COATED ORAL NIGHTLY
Refills: 0 | Status: ON HOLD | COMMUNITY
Start: 2019-10-10 | End: 2021-04-14 | Stop reason: HOSPADM

## 2019-11-07 RX ORDER — PROPRANOLOL HYDROCHLORIDE 10 MG/1
TABLET ORAL
Qty: 135 TABLET | Refills: 2 | Status: SHIPPED | OUTPATIENT
Start: 2019-11-07 | End: 2020-01-31 | Stop reason: SDUPTHER

## 2019-11-08 DIAGNOSIS — K21.9 GASTROESOPHAGEAL REFLUX DISEASE, ESOPHAGITIS PRESENCE NOT SPECIFIED: Primary | ICD-10-CM

## 2019-11-08 RX ORDER — OMEPRAZOLE 40 MG/1
40 CAPSULE, DELAYED RELEASE ORAL 2 TIMES DAILY
Qty: 30 CAPSULE | Refills: 0 | Status: SHIPPED | OUTPATIENT
Start: 2019-11-08 | End: 2019-12-05 | Stop reason: SDUPTHER

## 2019-11-08 RX ORDER — OMEPRAZOLE 40 MG/1
40 CAPSULE, DELAYED RELEASE ORAL 2 TIMES DAILY
Refills: 0 | COMMUNITY
Start: 2019-10-17 | End: 2019-11-08 | Stop reason: SDUPTHER

## 2019-11-20 ENCOUNTER — TELEPHONE (OUTPATIENT)
Dept: ADMINISTRATIVE | Age: 49
End: 2019-11-20

## 2019-11-25 ENCOUNTER — TELEPHONE (OUTPATIENT)
Dept: ADMINISTRATIVE | Age: 49
End: 2019-11-25

## 2019-11-25 NOTE — TELEPHONE ENCOUNTER
Pt's legal guardian called and requested appt. Pt complaining of having some cramping and soreness in chest x 5 days, unaware of any other symptoms. Pt is at [employer] and he will check with them if he is having any other complaints. No available appts on schedule x few weeks, advised ER if having chest pain. He will take pt to ER if having chest pain. If it is muscular pain, he will take pt to express care.

## 2019-12-05 DIAGNOSIS — K21.9 GASTROESOPHAGEAL REFLUX DISEASE, ESOPHAGITIS PRESENCE NOT SPECIFIED: ICD-10-CM

## 2019-12-05 RX ORDER — OMEPRAZOLE 40 MG/1
40 CAPSULE, DELAYED RELEASE ORAL 2 TIMES DAILY
Qty: 30 CAPSULE | Refills: 0 | Status: SHIPPED | OUTPATIENT
Start: 2019-12-05 | End: 2019-12-11 | Stop reason: DRUGHIGH

## 2019-12-05 RX ORDER — OMEPRAZOLE 20 MG/1
20 CAPSULE, DELAYED RELEASE ORAL 2 TIMES DAILY
Qty: 60 CAPSULE | Refills: 2 | Status: CANCELLED | OUTPATIENT
Start: 2019-12-05 | End: 2020-03-04

## 2019-12-11 ENCOUNTER — OFFICE VISIT (OUTPATIENT)
Dept: PRIMARY CARE CLINIC | Age: 49
End: 2019-12-11
Payer: MEDICAID

## 2019-12-11 ENCOUNTER — OFFICE VISIT (OUTPATIENT)
Dept: PODIATRY | Age: 49
End: 2019-12-11
Payer: MEDICAID

## 2019-12-11 VITALS
BODY MASS INDEX: 35.35 KG/M2 | TEMPERATURE: 97.8 F | RESPIRATION RATE: 16 BRPM | WEIGHT: 261 LBS | SYSTOLIC BLOOD PRESSURE: 120 MMHG | OXYGEN SATURATION: 98 % | HEIGHT: 72 IN | DIASTOLIC BLOOD PRESSURE: 72 MMHG | HEART RATE: 74 BPM

## 2019-12-11 DIAGNOSIS — B35.1 TINEA UNGUIUM: Primary | ICD-10-CM

## 2019-12-11 DIAGNOSIS — F33.2 SEVERE EPISODE OF RECURRENT MAJOR DEPRESSIVE DISORDER, WITHOUT PSYCHOTIC FEATURES (HCC): ICD-10-CM

## 2019-12-11 DIAGNOSIS — K21.9 GASTROESOPHAGEAL REFLUX DISEASE, ESOPHAGITIS PRESENCE NOT SPECIFIED: ICD-10-CM

## 2019-12-11 DIAGNOSIS — I10 ESSENTIAL HYPERTENSION: Primary | ICD-10-CM

## 2019-12-11 DIAGNOSIS — L84 CORNS AND CALLOSITIES: ICD-10-CM

## 2019-12-11 DIAGNOSIS — L85.3 XEROSIS CUTIS: ICD-10-CM

## 2019-12-11 DIAGNOSIS — G60.8 HEREDITARY SENSORY NEUROPATHY: ICD-10-CM

## 2019-12-11 PROCEDURE — 4004F PT TOBACCO SCREEN RCVD TLK: CPT | Performed by: FAMILY MEDICINE

## 2019-12-11 PROCEDURE — G8482 FLU IMMUNIZE ORDER/ADMIN: HCPCS | Performed by: FAMILY MEDICINE

## 2019-12-11 PROCEDURE — 99999 PR OFFICE/OUTPT VISIT,PROCEDURE ONLY: CPT | Performed by: PODIATRIST

## 2019-12-11 PROCEDURE — 99214 OFFICE O/P EST MOD 30 MIN: CPT | Performed by: FAMILY MEDICINE

## 2019-12-11 PROCEDURE — 11056 PARNG/CUTG B9 HYPRKR LES 2-4: CPT | Performed by: PODIATRIST

## 2019-12-11 PROCEDURE — G8417 CALC BMI ABV UP PARAM F/U: HCPCS | Performed by: FAMILY MEDICINE

## 2019-12-11 PROCEDURE — G8427 DOCREV CUR MEDS BY ELIG CLIN: HCPCS | Performed by: FAMILY MEDICINE

## 2019-12-11 PROCEDURE — 11721 DEBRIDE NAIL 6 OR MORE: CPT | Performed by: PODIATRIST

## 2019-12-11 RX ORDER — LORATADINE 10 MG/1
10 TABLET ORAL DAILY
Qty: 30 TABLET | Refills: 3 | Status: SHIPPED
Start: 2019-12-11 | End: 2020-04-09 | Stop reason: SDUPTHER

## 2019-12-11 RX ORDER — DIVALPROEX SODIUM 500 MG/1
TABLET, DELAYED RELEASE ORAL 2 TIMES DAILY
Refills: 0 | Status: ON HOLD | COMMUNITY
Start: 2019-12-05 | End: 2021-04-14 | Stop reason: HOSPADM

## 2019-12-11 RX ORDER — MULTIVIT WITH MINERALS/LUTEIN
TABLET ORAL
Refills: 0 | COMMUNITY
Start: 2019-10-24 | End: 2019-12-11

## 2019-12-11 RX ORDER — GABAPENTIN 400 MG/1
CAPSULE ORAL
Refills: 0 | COMMUNITY
Start: 2019-12-05 | End: 2019-12-11

## 2019-12-11 RX ORDER — SERTRALINE HYDROCHLORIDE 25 MG/1
25 TABLET, FILM COATED ORAL DAILY
Qty: 30 TABLET | Refills: 5 | Status: SHIPPED
Start: 2019-12-11 | End: 2020-05-04 | Stop reason: SDUPTHER

## 2019-12-11 RX ORDER — OMEPRAZOLE 40 MG/1
40 CAPSULE, DELAYED RELEASE ORAL 2 TIMES DAILY
Qty: 60 CAPSULE | Refills: 3 | Status: SHIPPED
Start: 2019-12-11 | End: 2020-03-11 | Stop reason: SDUPTHER

## 2019-12-11 RX ORDER — ACETAMINOPHEN, DIPHENHYDRAMINE HCL, PHENYLEPHRINE HCL 325; 25; 5 MG/1; MG/1; MG/1
TABLET ORAL
Refills: 0 | Status: ON HOLD | COMMUNITY
Start: 2019-12-05 | End: 2021-04-12

## 2019-12-11 RX ORDER — OMEPRAZOLE 40 MG/1
40 CAPSULE, DELAYED RELEASE ORAL 2 TIMES DAILY
Qty: 60 CAPSULE | Refills: 3 | Status: SHIPPED | OUTPATIENT
Start: 2019-12-11 | End: 2019-12-11 | Stop reason: SDUPTHER

## 2020-01-07 RX ORDER — GABAPENTIN 400 MG/1
400 CAPSULE ORAL 4 TIMES DAILY
Qty: 120 CAPSULE | Refills: 2 | Status: SHIPPED | OUTPATIENT
Start: 2020-01-07 | End: 2020-01-07 | Stop reason: SDUPTHER

## 2020-01-07 RX ORDER — GABAPENTIN 400 MG/1
400 CAPSULE ORAL 4 TIMES DAILY
Qty: 120 CAPSULE | Refills: 2 | Status: SHIPPED
Start: 2020-01-07 | End: 2020-04-09 | Stop reason: SDUPTHER

## 2020-01-29 ENCOUNTER — OFFICE VISIT (OUTPATIENT)
Dept: PRIMARY CARE CLINIC | Age: 50
End: 2020-01-29
Payer: MEDICAID

## 2020-01-29 VITALS
HEIGHT: 72 IN | DIASTOLIC BLOOD PRESSURE: 84 MMHG | RESPIRATION RATE: 18 BRPM | HEART RATE: 91 BPM | WEIGHT: 260 LBS | OXYGEN SATURATION: 96 % | SYSTOLIC BLOOD PRESSURE: 126 MMHG | BODY MASS INDEX: 35.21 KG/M2 | TEMPERATURE: 97.7 F

## 2020-01-29 PROCEDURE — 99213 OFFICE O/P EST LOW 20 MIN: CPT | Performed by: FAMILY MEDICINE

## 2020-01-29 PROCEDURE — 4004F PT TOBACCO SCREEN RCVD TLK: CPT | Performed by: FAMILY MEDICINE

## 2020-01-29 PROCEDURE — G8482 FLU IMMUNIZE ORDER/ADMIN: HCPCS | Performed by: FAMILY MEDICINE

## 2020-01-29 PROCEDURE — G8417 CALC BMI ABV UP PARAM F/U: HCPCS | Performed by: FAMILY MEDICINE

## 2020-01-29 PROCEDURE — G8427 DOCREV CUR MEDS BY ELIG CLIN: HCPCS | Performed by: FAMILY MEDICINE

## 2020-01-29 NOTE — PROGRESS NOTES
2020     [facility]    : 1970 Sex: male   Age: 51 y.o. Chief Complaint   Patient presents with    Other     Emergency room visit follow-up evaluation. HPI: This 51y.o. -year-old male  presents today for an evaluation from a recent emergency room visit. The patient presents emergency room with a complaint of low back pain with radiation into both right and left leg with some left leg numbness. The patient states he was prescribed a muscle relaxant as well as a steroid and given a pain medication. The patient states the pain recurred 2 days ago. The patient denies any incontinence of bowel or bladder. The patient denies any urinary retention. Current medication list reviewed. The patient is tolerating all medications well without adverse events or known side effects. The patient does understand the risk and benefits of the prescribed medications. The patient is up-to-date on all age-appropriate wellness issues. ROS:   Const: Denies changes in appetite, chills, fever, night sweats and weight loss. Eyes:  Denies discharge, a recent change in visual acuity, blurred vision and double vision. ENMT: Denies discharge of the ears, hearing loss, pain of the ears. Denies nasal or sinus symptoms other than stated above. Denies mouth or throat symptoms. CV:  Denies chest pain, dyspnea on exertion, orthopnea, palpitations and PND  Resp: Denies chest pain, cough, SOB and wheezing. GI: Denies abdominal pain, constipation, diarrhea, heartburn, indigestion, nausea and vomiting. : Denies dysuria, frequency, hematuria, nocturia and urgency. Musculo: Denies arthralgias and myalgia  Skin:  Denies lesions, pruritus and rash. Neuro: Denies dizziness, lightheadedness, numbness, tingling and weakness. Psych:  Denies anxiety and depression  Endocrine: Denies anxiety and depression. Hema/Lymph: Denies hematologic symptoms  Allergy/Immuno:  Denies allergic/immunologic symptoms.   Pertinent of education: Not on file    Highest education level: Not on file   Occupational History     Employer: NOT EMPLOYED   Social Needs    Financial resource strain: Not on file    Food insecurity:     Worry: Not on file     Inability: Not on file    Transportation needs:     Medical: Not on file     Non-medical: Not on file   Tobacco Use    Smoking status: Current Every Day Smoker     Packs/day: 1.50     Years: 9.00     Pack years: 13.50     Types: Cigarettes     Start date: 2/18/2000    Smokeless tobacco: Never Used   Substance and Sexual Activity    Alcohol use: Not on file    Drug use: Not on file    Sexual activity: Not on file   Lifestyle    Physical activity:     Days per week: Not on file     Minutes per session: Not on file    Stress: Not on file   Relationships    Social connections:     Talks on phone: Not on file     Gets together: Not on file     Attends Hinduism service: Not on file     Active member of club or organization: Not on file     Attends meetings of clubs or organizations: Not on file     Relationship status: Not on file    Intimate partner violence:     Fear of current or ex partner: Not on file     Emotionally abused: Not on file     Physically abused: Not on file     Forced sexual activity: Not on file   Other Topics Concern    Not on file   Social History Narrative    Not on file     No past surgical history on file. No family history on file. There were no vitals filed for this visit. Exam: Const: Appears healthy and well developed. No signs of acute distress present. Eyes: PERRL  ENMT: Tympanic membranes are intact. Nasal mucosa intact without noted erythema Septum is in the midline. Posterior pharynx shows no exudate, irritation or redness. Neck:  Supple without adenopathy. Adequate range of motion   Resp: No rales, rhonchi, wheezes appreciated over the lungs bilaterally. CV: S1, S2 within normal limits. Regular rate and rhythm noted.   Without murmur, gallop or rub. Extremities:  Pulses intact. Without noted edema. Abdomen: Positive bowel sounds. Palpation reveals softness, with no distension, organomegaly or tenderness. No abdominal masses palpable. Skin: Skin is warm and dry. Musculo: DTRs are 2+. Motor strength 5/5 of lower extremities. Adequate dorsi and plantar flexion. Straight leg raise negative bilateral.  No pain to palpation noted upon examination. Neuro: Alert and oriented X3. Cranial nerves grossly intact. Psych: Mood is normal.  Affect is normal.   Vital signs reviewed. Controlled Substances Monitoring:     No flowsheet data found. Plan Per Assessment:  Tamra Deleon was seen today for other. Diagnoses and all orders for this visit:    Lumbar radiculopathy  -     External Referral To Physical Therapy      Obtain and review emergency room records. Return in about 2 weeks (around 2/12/2020) for FOLLOW UP ACUTE VISIT. Gucci Hutton MD    Note was generated with the assistance of voice recognition software. Document was reviewed however may contain grammatical errors.

## 2020-01-31 RX ORDER — PROPRANOLOL HYDROCHLORIDE 10 MG/1
TABLET ORAL
Qty: 135 TABLET | Refills: 2 | Status: SHIPPED | OUTPATIENT
Start: 2020-01-31

## 2020-02-12 ENCOUNTER — OFFICE VISIT (OUTPATIENT)
Dept: PRIMARY CARE CLINIC | Age: 50
End: 2020-02-12
Payer: MEDICAID

## 2020-02-12 ENCOUNTER — PROCEDURE VISIT (OUTPATIENT)
Dept: PODIATRY | Age: 50
End: 2020-02-12
Payer: MEDICAID

## 2020-02-12 VITALS
DIASTOLIC BLOOD PRESSURE: 86 MMHG | OXYGEN SATURATION: 96 % | WEIGHT: 265 LBS | SYSTOLIC BLOOD PRESSURE: 134 MMHG | BODY MASS INDEX: 35.89 KG/M2 | RESPIRATION RATE: 18 BRPM | HEIGHT: 72 IN | HEART RATE: 93 BPM | TEMPERATURE: 97.3 F

## 2020-02-12 PROCEDURE — G8482 FLU IMMUNIZE ORDER/ADMIN: HCPCS | Performed by: FAMILY MEDICINE

## 2020-02-12 PROCEDURE — G8427 DOCREV CUR MEDS BY ELIG CLIN: HCPCS | Performed by: FAMILY MEDICINE

## 2020-02-12 PROCEDURE — 99213 OFFICE O/P EST LOW 20 MIN: CPT | Performed by: FAMILY MEDICINE

## 2020-02-12 PROCEDURE — 4004F PT TOBACCO SCREEN RCVD TLK: CPT | Performed by: FAMILY MEDICINE

## 2020-02-12 PROCEDURE — 11721 DEBRIDE NAIL 6 OR MORE: CPT | Performed by: PODIATRIST

## 2020-02-12 PROCEDURE — 11056 PARNG/CUTG B9 HYPRKR LES 2-4: CPT | Performed by: PODIATRIST

## 2020-02-12 PROCEDURE — G8417 CALC BMI ABV UP PARAM F/U: HCPCS | Performed by: FAMILY MEDICINE

## 2020-02-12 RX ORDER — UREA 390 MG/G
1 CREAM TOPICAL 2 TIMES DAILY
Qty: 1 BOTTLE | Refills: 2 | Status: ON HOLD
Start: 2020-02-12 | End: 2021-04-12

## 2020-02-12 NOTE — PROGRESS NOTES
2020     [facility]    : 1970 Sex: male   Age: 51 y.o. Chief Complaint   Patient presents with    Hypertension       HPI: This 51y.o. -year-old male  presents today for a follow-up evaluation. At his most recent office visit the patient was referred to physical therapy for evaluation and treatment of lumbar radiculopathy. The patient has had 1 session of physical therapy. The patient does have a scheduled twice weekly sessions. Current medication list reviewed. The patient is tolerating all medications well without adverse events or known side effects. The patient does understand the risk and benefits of the prescribed medications. The patient is up-to-date on all age-appropriate wellness issues. ROS:   Const: Denies changes in appetite, chills, fever, night sweats and weight loss. Eyes:  Denies discharge, a recent change in visual acuity, blurred vision and double vision. ENMT: Denies discharge of the ears, hearing loss, pain of the ears. Denies nasal or sinus symptoms other than stated above. Denies mouth or throat symptoms. CV:  Denies chest pain, dyspnea on exertion, orthopnea, palpitations and PND  Resp: Denies chest pain, cough, SOB and wheezing. GI: Denies abdominal pain, constipation, diarrhea, heartburn, indigestion, nausea and vomiting. : Denies dysuria, frequency, hematuria, nocturia and urgency. Musculo: Denies arthralgias and myalgia  Skin:  Denies lesions, pruritus and rash. Neuro: Denies dizziness, lightheadedness, numbness, tingling and weakness. Psych:  Denies anxiety and depression  Endocrine: Denies anxiety and depression. Hema/Lymph: Denies hematologic symptoms  Allergy/Immuno:  Denies allergic/immunologic symptoms. Pertinent positives reviewed and noted      Current Outpatient Medications:     propranolol (INDERAL) 10 MG tablet, Take one and one half tablet by mouth three times daily. , Disp: 135 tablet, Rfl: 2    divalproex (DEPAKOTE) 500 MG  tablet, , Disp: , Rfl: 0    RA MELATONIN 10 MG TABS, , Disp: , Rfl: 0    omeprazole (PRILOSEC) 40 MG delayed release capsule, Take 1 capsule by mouth 2 times daily, Disp: 60 capsule, Rfl: 3    loratadine (CLARITIN) 10 MG tablet, Take 1 tablet by mouth daily, Disp: 30 tablet, Rfl: 3    sertraline (ZOLOFT) 25 MG tablet, Take 1 tablet by mouth daily, Disp: 30 tablet, Rfl: 5    QUEtiapine (SEROQUEL) 400 MG tablet, Take 300 mg by mouth nightly , Disp: , Rfl: 0    ARISTADA 1064 MG/3.9ML PRSY injection, Inject 1 each as directed Every 2 months, Disp: , Rfl: 0    ammonium lactate (LAC-HYDRIN) 12 % lotion, Apply topically twice daily, Disp: 400 g, Rfl: 2    Cholecalciferol (VITAMIN D3) 5000 units TABS, Take 1 tablet by mouth daily, Disp: 30 tablet, Rfl: 5    haloperidol (HALDOL) 5 MG tablet, Take 5 mg by mouth daily, Disp: , Rfl:     lisinopril (PRINIVIL;ZESTRIL) 20 MG tablet, Take 1 tablet by mouth daily, Disp: 90 tablet, Rfl: 2    montelukast (SINGULAIR) 10 MG tablet, Take 1 tablet by mouth nightly, Disp: 90 tablet, Rfl: 2    tamsulosin (FLOMAX) 0.4 MG capsule, Take 1 capsule by mouth daily, Disp: 90 capsule, Rfl: 2    benztropine (COGENTIN) 1 MG tablet, Take 1 mg by mouth 2 times daily, Disp: , Rfl:     gabapentin (NEURONTIN) 400 MG capsule, Take 1 capsule by mouth 4 times daily for 30 days. , Disp: 120 capsule, Rfl: 2    Allergies   Allergen Reactions    Lithium Swelling    Wellbutrin [Bupropion] Other (See Comments)     shaking    Zyprexa [Olanzapine]     Tegretol [Carbamazepine] Nausea And Vomiting       No past medical history on file.   Social History     Socioeconomic History    Marital status: Single     Spouse name: Not on file    Number of children: Not on file    Years of education: Not on file    Highest education level: Not on file   Occupational History     Employer: NOT EMPLOYED   Social Needs    Financial resource strain: Not on file    Food insecurity:     Worry: Not on file Inability: Not on file    Transportation needs:     Medical: Not on file     Non-medical: Not on file   Tobacco Use    Smoking status: Current Every Day Smoker     Packs/day: 1.50     Years: 9.00     Pack years: 13.50     Types: Cigarettes     Start date: 2/18/2000    Smokeless tobacco: Never Used   Substance and Sexual Activity    Alcohol use: Not on file    Drug use: Not on file    Sexual activity: Not on file   Lifestyle    Physical activity:     Days per week: Not on file     Minutes per session: Not on file    Stress: Not on file   Relationships    Social connections:     Talks on phone: Not on file     Gets together: Not on file     Attends Confucianism service: Not on file     Active member of club or organization: Not on file     Attends meetings of clubs or organizations: Not on file     Relationship status: Not on file    Intimate partner violence:     Fear of current or ex partner: Not on file     Emotionally abused: Not on file     Physically abused: Not on file     Forced sexual activity: Not on file   Other Topics Concern    Not on file   Social History Narrative    Not on file     No past surgical history on file. No family history on file. Vitals:    02/12/20 0821   BP: 134/86   Pulse: 93   Resp: 18   Temp: 97.3 °F (36.3 °C)   SpO2: 96%   Weight: 265 lb (120.2 kg)   Height: 6' (1.829 m)        Exam: Const: Appears healthy and well developed. No signs of acute distress present. Eyes: PERRL  ENMT: Tympanic membranes are intact. Nasal mucosa intact without noted erythema Septum is in the midline. Posterior pharynx shows no exudate, irritation or redness. Neck:  Supple without adenopathy. Adequate range of motion   Resp: No rales, rhonchi, wheezes appreciated over the lungs bilaterally. CV: S1, S2 within normal limits. Regular rate and rhythm noted. Without murmur, gallop or rub. Extremities:  Pulses intact. Without noted edema. Abdomen: Positive bowel sounds.   Palpation

## 2020-02-12 NOTE — PROGRESS NOTES
(HALDOL) 5 MG tablet, Take 5 mg by mouth daily, Disp: , Rfl:     lisinopril (PRINIVIL;ZESTRIL) 20 MG tablet, Take 1 tablet by mouth daily, Disp: 90 tablet, Rfl: 2    montelukast (SINGULAIR) 10 MG tablet, Take 1 tablet by mouth nightly, Disp: 90 tablet, Rfl: 2    tamsulosin (FLOMAX) 0.4 MG capsule, Take 1 capsule by mouth daily, Disp: 90 capsule, Rfl: 2    benztropine (COGENTIN) 1 MG tablet, Take 1 mg by mouth 2 times daily, Disp: , Rfl:   Allergies   Allergen Reactions    Lithium Swelling    Wellbutrin [Bupropion] Other (See Comments)     shaking    Zyprexa [Olanzapine]     Tegretol [Carbamazepine] Nausea And Vomiting       No past medical history on file. There were no vitals filed for this visit. Work History/Social History:  [employer], family [place]    Focused Lower Extremity Physical Exam:    Neurovascular examination:    Dorsalis Pedis palpable bilateral.  Posterior tibialis non-palpable bilateral.    Capillary Refill Time:  Immediate return  Hair growth:  Symmetrical and bilateral   Skin:  Not atrophic  Edema: Mild edema bilateral feet. Mild edema bilateral ankles. Neurologic:  Light touch diminished bilateral.  Warm to coolness bilateral distal toes  Decreased epicritic sensation    Musculoskeletal/ Orthopedic examination:    Equinis: present bilateral  Dorsiflexion, plantarflexion, inversion, eversion bilateral 5 out of 5 muscle strength  Wiggling toes  Negative Homans    Dermatology examination:    Toenails 1 through 5 bilateral thickened, elongated, dystrophic, mycotic with subungual debris. Web spaces 1 through 4 bilateral clean dry and intact. Hyperkeratotic tissue medial IPJ great toe bilateral and plantar first metatarsal head bilateral.        Assessment and Plan:  Von Edwards was seen today for callouses and nail problem.     Diagnoses and all orders for this visit:    Tinea unguium    Corns and callosities    Hereditary sensory neuropathy    Xerosis cutis    Other orders  -     Urea

## 2020-02-27 RX ORDER — UREA 40 %
CREAM (GRAM) TOPICAL
Qty: 1 TUBE | Refills: 3 | Status: ON HOLD
Start: 2020-02-27 | End: 2021-04-12

## 2020-03-11 ENCOUNTER — OFFICE VISIT (OUTPATIENT)
Dept: PRIMARY CARE CLINIC | Age: 50
End: 2020-03-11
Payer: MEDICAID

## 2020-03-11 VITALS
BODY MASS INDEX: 37.65 KG/M2 | HEIGHT: 72 IN | SYSTOLIC BLOOD PRESSURE: 130 MMHG | DIASTOLIC BLOOD PRESSURE: 60 MMHG | OXYGEN SATURATION: 96 % | RESPIRATION RATE: 16 BRPM | WEIGHT: 278 LBS | HEART RATE: 82 BPM

## 2020-03-11 PROCEDURE — 99214 OFFICE O/P EST MOD 30 MIN: CPT | Performed by: FAMILY MEDICINE

## 2020-03-11 PROCEDURE — G8482 FLU IMMUNIZE ORDER/ADMIN: HCPCS | Performed by: FAMILY MEDICINE

## 2020-03-11 PROCEDURE — 4004F PT TOBACCO SCREEN RCVD TLK: CPT | Performed by: FAMILY MEDICINE

## 2020-03-11 PROCEDURE — G8417 CALC BMI ABV UP PARAM F/U: HCPCS | Performed by: FAMILY MEDICINE

## 2020-03-11 PROCEDURE — G8427 DOCREV CUR MEDS BY ELIG CLIN: HCPCS | Performed by: FAMILY MEDICINE

## 2020-03-11 RX ORDER — OMEPRAZOLE 40 MG/1
40 CAPSULE, DELAYED RELEASE ORAL 2 TIMES DAILY
Qty: 60 CAPSULE | Refills: 3 | Status: SHIPPED
Start: 2020-03-11 | End: 2020-06-18 | Stop reason: SDUPTHER

## 2020-03-11 RX ORDER — METHOCARBAMOL 500 MG/1
TABLET, FILM COATED ORAL
Status: ON HOLD | COMMUNITY
Start: 2020-02-16 | End: 2021-04-12

## 2020-03-11 RX ORDER — MOMETASONE FUROATE 50 UG/1
2 SPRAY, METERED NASAL DAILY
Qty: 1 INHALER | Refills: 3 | Status: ON HOLD
Start: 2020-03-11 | End: 2021-04-12

## 2020-03-11 NOTE — PROGRESS NOTES
3/11/2020     Jacklyn Valladares    : 1970 Sex: male   Age: 51 y.o. Chief Complaint   Patient presents with    Hypertension    Depression    Gastroesophageal Reflux       HPI: This 51y.o. -year-old male  presents today for evaluation and management of his  chronic medical problems. Current medication list reviewed. The patient is tolerating all medications well without adverse events or known side effects. The patient does understand the risk and benefits of the prescribed medications. The patient is up-to-date on all age-appropriate wellness issues. The patient presents today stating he is having breakthrough allergy symptoms. ROS:   Const: Denies changes in appetite, chills, fever, night sweats and weight loss. Eyes:  Denies discharge, a recent change in visual acuity, blurred vision and double vision. ENMT: Denies discharge of the ears, hearing loss, pain of the ears. Denies nasal or sinus symptoms other than stated above. Denies mouth or throat symptoms. CV:  Denies chest pain, dyspnea on exertion, orthopnea, palpitations and PND  Resp: Denies chest pain, cough, SOB and wheezing. GI: Denies abdominal pain, constipation, diarrhea, heartburn, indigestion, nausea and vomiting. : Denies dysuria, frequency, hematuria, nocturia and urgency. Musculo: Denies arthralgias and myalgia  Skin:  Denies lesions, pruritus and rash. Neuro: Denies dizziness, lightheadedness, numbness, tingling and weakness. Psych:  Denies anxiety and depression  Endocrine: Denies anxiety and depression. Hema/Lymph: Denies hematologic symptoms  Allergy/Immuno:  Denies allergic/immunologic symptoms.   Pertinent positives reviewed and noted      Current Outpatient Medications:     omeprazole (PRILOSEC) 40 MG delayed release capsule, Take 1 capsule by mouth 2 times daily, Disp: 60 capsule, Rfl: 3    mometasone (NASONEX) 50 MCG/ACT nasal spray, 2 sprays by Nasal route daily, Disp: 1 Inhaler, Rfl: 3    Urea (CARMOL) 40

## 2020-03-19 ENCOUNTER — TELEPHONE (OUTPATIENT)
Dept: PRIMARY CARE CLINIC | Age: 50
End: 2020-03-19

## 2020-03-19 NOTE — TELEPHONE ENCOUNTER
Pt is requesting more out patient rehab on his lower back. Pt states that it is helping. Referral to be sent to [facility].         If any questions or need to fax order to [facility] [phone] phone               [phone]  fax

## 2020-04-09 RX ORDER — GABAPENTIN 400 MG/1
400 CAPSULE ORAL 4 TIMES DAILY
Qty: 120 CAPSULE | Refills: 2 | Status: SHIPPED
Start: 2020-04-09 | End: 2020-06-25 | Stop reason: SDUPTHER

## 2020-04-09 RX ORDER — LORATADINE 10 MG/1
10 TABLET ORAL DAILY
Qty: 30 TABLET | Refills: 3 | Status: SHIPPED | OUTPATIENT
Start: 2020-04-09

## 2020-05-04 RX ORDER — MONTELUKAST SODIUM 10 MG/1
10 TABLET ORAL NIGHTLY
Qty: 90 TABLET | Refills: 5 | Status: SHIPPED | OUTPATIENT
Start: 2020-05-04

## 2020-05-04 RX ORDER — SERTRALINE HYDROCHLORIDE 25 MG/1
25 TABLET, FILM COATED ORAL DAILY
Qty: 30 TABLET | Refills: 5 | Status: ON HOLD
Start: 2020-05-04 | End: 2021-04-14 | Stop reason: HOSPADM

## 2020-05-04 RX ORDER — LISINOPRIL 20 MG/1
20 TABLET ORAL DAILY
Qty: 90 TABLET | Refills: 5 | Status: SHIPPED | OUTPATIENT
Start: 2020-05-04

## 2020-05-04 NOTE — TELEPHONE ENCOUNTER
Patient needs pended med refilled.         Electronically signed by Josue Hess LPN on 3/3/5371 at 30:78 AM

## 2020-06-18 RX ORDER — OMEPRAZOLE 40 MG/1
40 CAPSULE, DELAYED RELEASE ORAL 2 TIMES DAILY
Qty: 60 CAPSULE | Refills: 3 | Status: SHIPPED | OUTPATIENT
Start: 2020-06-18

## 2020-06-25 RX ORDER — GABAPENTIN 400 MG/1
400 CAPSULE ORAL 4 TIMES DAILY
Qty: 120 CAPSULE | Refills: 2 | Status: SHIPPED | OUTPATIENT
Start: 2020-07-03 | End: 2020-10-01

## 2020-07-29 RX ORDER — TAMSULOSIN HYDROCHLORIDE 0.4 MG/1
0.4 CAPSULE ORAL DAILY
Qty: 90 CAPSULE | Refills: 2 | Status: CANCELLED | OUTPATIENT
Start: 2020-07-29

## 2020-07-29 RX ORDER — LORATADINE 10 MG/1
10 TABLET ORAL DAILY
Qty: 30 TABLET | Refills: 3 | Status: CANCELLED | OUTPATIENT
Start: 2020-07-29

## 2020-09-23 RX ORDER — GABAPENTIN 400 MG/1
400 CAPSULE ORAL 4 TIMES DAILY
Qty: 120 CAPSULE | Refills: 2 | Status: CANCELLED | OUTPATIENT
Start: 2020-09-23 | End: 2020-12-22

## 2020-11-10 RX ORDER — GABAPENTIN 400 MG/1
400 CAPSULE ORAL 4 TIMES DAILY
Qty: 120 CAPSULE | Refills: 2 | OUTPATIENT
Start: 2020-11-10 | End: 2021-02-08

## 2020-12-08 ENCOUNTER — TELEPHONE (OUTPATIENT)
Dept: PRIMARY CARE CLINIC | Age: 50
End: 2020-12-08

## 2020-12-08 NOTE — TELEPHONE ENCOUNTER
Last Appointment:  6/11/2020  No future appointments. [facility] had denied Mometasone. Please advise.     Electronically signed by Mook Turner LPN on 20/4/6280 at 3:29 AM

## 2021-04-09 ENCOUNTER — HOSPITAL ENCOUNTER (EMERGENCY)
Age: 51
Discharge: ANOTHER HOSPITAL | End: 2021-04-10
Attending: EMERGENCY MEDICINE
Payer: MEDICAID

## 2021-04-09 DIAGNOSIS — F25.9 SCHIZOAFFECTIVE DISORDER, UNSPECIFIED TYPE (HCC): Primary | ICD-10-CM

## 2021-04-09 DIAGNOSIS — F23 ACUTE PSYCHOSIS (HCC): ICD-10-CM

## 2021-04-09 LAB
ACETAMINOPHEN LEVEL: <5 MCG/ML (ref 10–30)
ALBUMIN SERPL-MCNC: 4.1 G/DL (ref 3.5–5.2)
ALP BLD-CCNC: 79 U/L (ref 40–129)
ALT SERPL-CCNC: 39 U/L (ref 0–40)
AMPHETAMINE SCREEN, URINE: NOT DETECTED
ANION GAP SERPL CALCULATED.3IONS-SCNC: 8 MMOL/L (ref 7–16)
AST SERPL-CCNC: 24 U/L (ref 0–39)
BARBITURATE SCREEN URINE: NOT DETECTED
BASOPHILS ABSOLUTE: 0.06 E9/L (ref 0–0.2)
BASOPHILS RELATIVE PERCENT: 0.5 % (ref 0–2)
BENZODIAZEPINE SCREEN, URINE: NOT DETECTED
BILIRUB SERPL-MCNC: 0.3 MG/DL (ref 0–1.2)
BILIRUBIN URINE: NEGATIVE
BLOOD, URINE: NEGATIVE
BUN BLDV-MCNC: 11 MG/DL (ref 6–20)
CALCIUM SERPL-MCNC: 9.8 MG/DL (ref 8.6–10.2)
CANNABINOID SCREEN URINE: NOT DETECTED
CHLORIDE BLD-SCNC: 101 MMOL/L (ref 98–107)
CLARITY: CLEAR
CO2: 27 MMOL/L (ref 22–29)
COCAINE METABOLITE SCREEN URINE: NOT DETECTED
COLOR: YELLOW
CREAT SERPL-MCNC: 0.6 MG/DL (ref 0.7–1.2)
EKG ATRIAL RATE: 86 BPM
EKG P AXIS: 67 DEGREES
EKG P-R INTERVAL: 162 MS
EKG Q-T INTERVAL: 376 MS
EKG QRS DURATION: 98 MS
EKG QTC CALCULATION (BAZETT): 449 MS
EKG R AXIS: 37 DEGREES
EKG T AXIS: 46 DEGREES
EKG VENTRICULAR RATE: 86 BPM
EOSINOPHILS ABSOLUTE: 0.06 E9/L (ref 0.05–0.5)
EOSINOPHILS RELATIVE PERCENT: 0.5 % (ref 0–6)
ETHANOL: <10 MG/DL (ref 0–0.08)
FENTANYL SCREEN, URINE: NOT DETECTED
GFR AFRICAN AMERICAN: >60
GFR NON-AFRICAN AMERICAN: >60 ML/MIN/1.73
GLUCOSE BLD-MCNC: 127 MG/DL (ref 74–99)
GLUCOSE URINE: NEGATIVE MG/DL
HCT VFR BLD CALC: 40.2 % (ref 37–54)
HEMOGLOBIN: 13.8 G/DL (ref 12.5–16.5)
IMMATURE GRANULOCYTES #: 0.05 E9/L
IMMATURE GRANULOCYTES %: 0.4 % (ref 0–5)
KETONES, URINE: NEGATIVE MG/DL
LEUKOCYTE ESTERASE, URINE: NEGATIVE
LYMPHOCYTES ABSOLUTE: 2.62 E9/L (ref 1.5–4)
LYMPHOCYTES RELATIVE PERCENT: 22.9 % (ref 20–42)
Lab: NORMAL
MCH RBC QN AUTO: 31.2 PG (ref 26–35)
MCHC RBC AUTO-ENTMCNC: 34.3 % (ref 32–34.5)
MCV RBC AUTO: 91 FL (ref 80–99.9)
METHADONE SCREEN, URINE: NOT DETECTED
MONOCYTES ABSOLUTE: 0.86 E9/L (ref 0.1–0.95)
MONOCYTES RELATIVE PERCENT: 7.5 % (ref 2–12)
NEUTROPHILS ABSOLUTE: 7.81 E9/L (ref 1.8–7.3)
NEUTROPHILS RELATIVE PERCENT: 68.2 % (ref 43–80)
NITRITE, URINE: NEGATIVE
OPIATE SCREEN URINE: NOT DETECTED
OXYCODONE URINE: NOT DETECTED
PDW BLD-RTO: 13.4 FL (ref 11.5–15)
PH UA: 7.5 (ref 5–9)
PHENCYCLIDINE SCREEN URINE: NOT DETECTED
PLATELET # BLD: 254 E9/L (ref 130–450)
PMV BLD AUTO: 11.2 FL (ref 7–12)
POTASSIUM REFLEX MAGNESIUM: 4.6 MMOL/L (ref 3.5–5)
PROTEIN UA: NEGATIVE MG/DL
RBC # BLD: 4.42 E12/L (ref 3.8–5.8)
SALICYLATE, SERUM: <0.3 MG/DL (ref 0–30)
SARS-COV-2, NAAT: NOT DETECTED
SODIUM BLD-SCNC: 136 MMOL/L (ref 132–146)
SPECIFIC GRAVITY UA: 1.02 (ref 1–1.03)
TOTAL PROTEIN: 7.2 G/DL (ref 6.4–8.3)
TRICYCLIC ANTIDEPRESSANTS SCREEN SERUM: NEGATIVE NG/ML
UROBILINOGEN, URINE: 0.2 E.U./DL
WBC # BLD: 11.5 E9/L (ref 4.5–11.5)

## 2021-04-09 PROCEDURE — 93010 ELECTROCARDIOGRAM REPORT: CPT | Performed by: INTERNAL MEDICINE

## 2021-04-09 PROCEDURE — 80053 COMPREHEN METABOLIC PANEL: CPT

## 2021-04-09 PROCEDURE — 82077 ASSAY SPEC XCP UR&BREATH IA: CPT

## 2021-04-09 PROCEDURE — 87635 SARS-COV-2 COVID-19 AMP PRB: CPT

## 2021-04-09 PROCEDURE — 99285 EMERGENCY DEPT VISIT HI MDM: CPT

## 2021-04-09 PROCEDURE — 6360000002 HC RX W HCPCS: Performed by: EMERGENCY MEDICINE

## 2021-04-09 PROCEDURE — 80179 DRUG ASSAY SALICYLATE: CPT

## 2021-04-09 PROCEDURE — 80143 DRUG ASSAY ACETAMINOPHEN: CPT

## 2021-04-09 PROCEDURE — 93005 ELECTROCARDIOGRAM TRACING: CPT | Performed by: EMERGENCY MEDICINE

## 2021-04-09 PROCEDURE — 81003 URINALYSIS AUTO W/O SCOPE: CPT

## 2021-04-09 PROCEDURE — 6360000002 HC RX W HCPCS

## 2021-04-09 PROCEDURE — 96372 THER/PROPH/DIAG INJ SC/IM: CPT

## 2021-04-09 PROCEDURE — 80307 DRUG TEST PRSMV CHEM ANLYZR: CPT

## 2021-04-09 PROCEDURE — 85025 COMPLETE CBC W/AUTO DIFF WBC: CPT

## 2021-04-09 RX ORDER — LORAZEPAM 2 MG/ML
INJECTION INTRAMUSCULAR
Status: COMPLETED
Start: 2021-04-09 | End: 2021-04-09

## 2021-04-09 RX ORDER — LORAZEPAM 2 MG/ML
2 INJECTION INTRAMUSCULAR ONCE
Status: COMPLETED | OUTPATIENT
Start: 2021-04-09 | End: 2021-04-09

## 2021-04-09 RX ORDER — HALOPERIDOL 5 MG/ML
5 INJECTION INTRAMUSCULAR ONCE
Status: DISCONTINUED | OUTPATIENT
Start: 2021-04-09 | End: 2021-04-09

## 2021-04-09 RX ADMIN — LORAZEPAM 2 MG: 2 INJECTION INTRAMUSCULAR; INTRAVENOUS at 19:59

## 2021-04-09 RX ADMIN — LORAZEPAM 2 MG: 2 INJECTION INTRAMUSCULAR at 19:59

## 2021-04-09 NOTE — ED PROVIDER NOTES
pack-year smoking history. He has never used smokeless tobacco. He reports previous alcohol use. Family History: family history is not on file. The patients home medications have been reviewed.     Allergies: Lithium, Wellbutrin [bupropion], Zyprexa [olanzapine], and Tegretol [carbamazepine]    -------------------------------------------------- RESULTS -------------------------------------------------  All laboratory and radiology results have been personally reviewed by myself   LABS:  Results for orders placed or performed during the hospital encounter of 04/09/21   COVID-19, Rapid    Specimen: Nasopharyngeal Swab   Result Value Ref Range    SARS-CoV-2, NAAT Not Detected Not Detected   CBC Auto Differential   Result Value Ref Range    WBC 11.5 4.5 - 11.5 E9/L    RBC 4.42 3.80 - 5.80 E12/L    Hemoglobin 13.8 12.5 - 16.5 g/dL    Hematocrit 40.2 37.0 - 54.0 %    MCV 91.0 80.0 - 99.9 fL    MCH 31.2 26.0 - 35.0 pg    MCHC 34.3 32.0 - 34.5 %    RDW 13.4 11.5 - 15.0 fL    Platelets 751 870 - 156 E9/L    MPV 11.2 7.0 - 12.0 fL    Neutrophils % 68.2 43.0 - 80.0 %    Immature Granulocytes % 0.4 0.0 - 5.0 %    Lymphocytes % 22.9 20.0 - 42.0 %    Monocytes % 7.5 2.0 - 12.0 %    Eosinophils % 0.5 0.0 - 6.0 %    Basophils % 0.5 0.0 - 2.0 %    Neutrophils Absolute 7.81 (H) 1.80 - 7.30 E9/L    Immature Granulocytes # 0.05 E9/L    Lymphocytes Absolute 2.62 1.50 - 4.00 E9/L    Monocytes Absolute 0.86 0.10 - 0.95 E9/L    Eosinophils Absolute 0.06 0.05 - 0.50 E9/L    Basophils Absolute 0.06 0.00 - 0.20 E9/L   Comprehensive Metabolic Panel w/ Reflex to MG   Result Value Ref Range    Sodium 136 132 - 146 mmol/L    Potassium reflex Magnesium 4.6 3.5 - 5.0 mmol/L    Chloride 101 98 - 107 mmol/L    CO2 27 22 - 29 mmol/L    Anion Gap 8 7 - 16 mmol/L    Glucose 127 (H) 74 - 99 mg/dL    BUN 11 6 - 20 mg/dL    CREATININE 0.6 (L) 0.7 - 1.2 mg/dL    GFR Non-African American >60 >=60 mL/min/1.73    GFR African American >60     Calcium 9.8 AND VITALS REVIEWED ---------------------------   The nursing notes within the ED encounter and vital signs as below have been reviewed. BP (!) 162/72   Pulse 95   Temp 98.5 °F (36.9 °C) (Oral)   Resp 18   Ht 6' (1.829 m)   Wt 278 lb (126.1 kg)   SpO2 95%   BMI 37.70 kg/m²   Oxygen Saturation Interpretation: Normal      ---------------------------------------------------PHYSICAL EXAM--------------------------------------      Constitutional/General: Alert and oriented x3, well appearing, non toxic in NAD  Head: Normocephalic and atraumatic  Eyes: PERRL, EOMI  Mouth: Oropharynx clear, handling secretions, no trismus  Neck: Supple, full ROM,   Pulmonary: Lungs clear to auscultation bilaterally, no wheezes, rales, or rhonchi. Not in respiratory distress  Cardiovascular:  Regular rate and rhythm, no murmurs, gallops, or rubs. 2+ distal pulses  Abdomen: Soft, non tender, non distended,   Extremities: Moves all extremities x 4. Warm and well perfused  Skin: warm and dry without rash  Neurologic: GCS 15, no focal motor or sensory deficits   Psych: Anxious Affect. + Paranoia. No hallucinations. No SI. No HI.      ------------------------------ ED COURSE/MEDICAL DECISION MAKING----------------------  Medications   LORazepam (ATIVAN) injection 2 mg (2 mg Intramuscular Given 4/9/21 1959)       Medical Decision Making/ED COURSE:   Patient is a 62-year-old male with history of schizoaffective disorder presenting for psychiatric evaluation. Patient appears paranoid on exam but is calm and cooperative. He is denying active suicidal or homicidal ideation. Reports medication noncompliance. Medical screening exam was unremarkable. Medical screening labs ordered. Social work consulted. Due to concern for patient's paranoia and reported combative behavior at home, patient was pink slipped. I reviewed and interpreted labs. Medical screening labs unremarkable. Patient medically clear.     Patient remained hemodynamically stable throughout ED course. ED Course as of Apr 10 0033   Fri Apr 09, 2021   1552 EKG: This EKG is signed and interpreted by me. Rate: 86  Rhythm: Sinus  Interpretation: Normal sinus rhythm, normal NV interval, normal QRS, normal QT interval, no acute ST or T wave changes  Comparison: no previous EKG available        [JA]   1732 Patient medically clear for psych eval.    [JA]      ED Course User Index  [JA] Kimberly Solano MD       Counseling: The emergency provider has spoken with the patient and discussed todays results, in addition to providing specific details for the plan of care and counseling regarding the diagnosis and prognosis. Questions are answered at this time and they are agreeable with the plan.      --------------------------------- IMPRESSION AND DISPOSITION ---------------------------------    IMPRESSION  1. Schizoaffective disorder, unspecified type ([facility]    2. Acute psychosis ([facility]        DISPOSITION  Disposition: Patient medically clear for psych eval  Patient condition is stable      NOTE: This report was transcribed using voice recognition software.  Every effort was made to ensure accuracy; however, inadvertent computerized transcription errors may be present    IKimberly MD, am the primary provider of this record       Kimberly Solano MD  04/10/21 7072

## 2021-04-09 NOTE — ED NOTES
This nurse and [address] [place] [address] RN spoke with Krystyna Jurist, legal guardian, consent to treat given.      Elana Conway RN  04/09/21 3824
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
## 2021-04-09 NOTE — ED NOTES
Pt became upset and was loudly verbalizing that \"if he is held here for 72 hours, there will be a blood bath. \" He will leave a warpath wherever he goes. \" Officer in room interacting with patient at this time with this RN present. Pt continued to escalate, using racial slurs, stating that \"I hate ni**ers\". Pt instructed by officer to stop using derogatory language, pt complied after being prompted a few times. Officer able to deescalate patient at this time after informing him about pink slip hold. Constant observation continues. Pt remains in bed.      Royal Dorman, RN  04/09/21 1949
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
## 2021-04-10 ENCOUNTER — HOSPITAL ENCOUNTER (INPATIENT)
Age: 51
LOS: 4 days | Discharge: HOME | DRG: 750 | End: 2021-04-14
Attending: PSYCHIATRY & NEUROLOGY | Admitting: PSYCHIATRY & NEUROLOGY
Payer: MEDICAID

## 2021-04-10 VITALS
HEIGHT: 72 IN | OXYGEN SATURATION: 95 % | WEIGHT: 278 LBS | DIASTOLIC BLOOD PRESSURE: 75 MMHG | SYSTOLIC BLOOD PRESSURE: 142 MMHG | BODY MASS INDEX: 37.65 KG/M2 | TEMPERATURE: 98.6 F | RESPIRATION RATE: 18 BRPM | HEART RATE: 82 BPM

## 2021-04-10 PROBLEM — F25.0 SCHIZOAFFECTIVE DISORDER, BIPOLAR TYPE (HCC): Status: ACTIVE | Noted: 2021-04-10

## 2021-04-10 PROBLEM — F25.9 SCHIZOAFFECTIVE DISORDER (HCC): Status: ACTIVE | Noted: 2021-04-10

## 2021-04-10 PROCEDURE — 6360000002 HC RX W HCPCS: Performed by: NURSE PRACTITIONER

## 2021-04-10 PROCEDURE — 6370000000 HC RX 637 (ALT 250 FOR IP): Performed by: NURSE PRACTITIONER

## 2021-04-10 PROCEDURE — 99222 1ST HOSP IP/OBS MODERATE 55: CPT | Performed by: NURSE PRACTITIONER

## 2021-04-10 PROCEDURE — 1240000000 HC EMOTIONAL WELLNESS R&B

## 2021-04-10 PROCEDURE — 2580000003 HC RX 258: Performed by: NURSE PRACTITIONER

## 2021-04-10 PROCEDURE — 6370000000 HC RX 637 (ALT 250 FOR IP): Performed by: PSYCHIATRY & NEUROLOGY

## 2021-04-10 RX ORDER — CETIRIZINE HYDROCHLORIDE 10 MG/1
10 TABLET ORAL DAILY
Refills: 3 | Status: DISCONTINUED | OUTPATIENT
Start: 2021-04-10 | End: 2021-04-14 | Stop reason: HOSPADM

## 2021-04-10 RX ORDER — HYDROXYZINE PAMOATE 25 MG/1
50 CAPSULE ORAL 3 TIMES DAILY PRN
Status: DISCONTINUED | OUTPATIENT
Start: 2021-04-10 | End: 2021-04-14 | Stop reason: HOSPADM

## 2021-04-10 RX ORDER — DIVALPROEX SODIUM 250 MG/1
500 TABLET, DELAYED RELEASE ORAL EVERY 8 HOURS SCHEDULED
Status: DISCONTINUED | OUTPATIENT
Start: 2021-04-10 | End: 2021-04-13

## 2021-04-10 RX ORDER — LORAZEPAM 2 MG/ML
2 INJECTION INTRAMUSCULAR EVERY 6 HOURS PRN
Status: DISCONTINUED | OUTPATIENT
Start: 2021-04-10 | End: 2021-04-14 | Stop reason: HOSPADM

## 2021-04-10 RX ORDER — ZIPRASIDONE MESYLATE 20 MG/ML
20 INJECTION, POWDER, LYOPHILIZED, FOR SOLUTION INTRAMUSCULAR EVERY 6 HOURS PRN
Status: DISCONTINUED | OUTPATIENT
Start: 2021-04-10 | End: 2021-04-14 | Stop reason: HOSPADM

## 2021-04-10 RX ORDER — PANTOPRAZOLE SODIUM 40 MG/1
40 TABLET, DELAYED RELEASE ORAL
Status: DISCONTINUED | OUTPATIENT
Start: 2021-04-11 | End: 2021-04-14 | Stop reason: HOSPADM

## 2021-04-10 RX ORDER — CHOLECALCIFEROL (VITAMIN D3) 50 MCG
5000 TABLET ORAL DAILY
Status: DISCONTINUED | OUTPATIENT
Start: 2021-04-10 | End: 2021-04-14 | Stop reason: HOSPADM

## 2021-04-10 RX ORDER — QUETIAPINE FUMARATE 50 MG/1
50 TABLET, EXTENDED RELEASE ORAL DAILY
Status: DISCONTINUED | OUTPATIENT
Start: 2021-04-10 | End: 2021-04-11

## 2021-04-10 RX ORDER — ACETAMINOPHEN 325 MG/1
650 TABLET ORAL EVERY 6 HOURS PRN
Status: DISCONTINUED | OUTPATIENT
Start: 2021-04-10 | End: 2021-04-14 | Stop reason: HOSPADM

## 2021-04-10 RX ORDER — HALOPERIDOL 5 MG
10 TABLET ORAL EVERY 6 HOURS PRN
Status: DISCONTINUED | OUTPATIENT
Start: 2021-04-10 | End: 2021-04-14 | Stop reason: HOSPADM

## 2021-04-10 RX ORDER — LORAZEPAM 2 MG/ML
2 INJECTION INTRAMUSCULAR EVERY 6 HOURS PRN
Status: DISCONTINUED | OUTPATIENT
Start: 2021-04-10 | End: 2021-04-10

## 2021-04-10 RX ORDER — HALOPERIDOL 5 MG/ML
5 INJECTION INTRAMUSCULAR EVERY 6 HOURS PRN
Status: DISCONTINUED | OUTPATIENT
Start: 2021-04-10 | End: 2021-04-10

## 2021-04-10 RX ORDER — MONTELUKAST SODIUM 10 MG/1
10 TABLET ORAL NIGHTLY
Status: DISCONTINUED | OUTPATIENT
Start: 2021-04-10 | End: 2021-04-14 | Stop reason: HOSPADM

## 2021-04-10 RX ORDER — FLUTICASONE PROPIONATE 50 MCG
2 SPRAY, SUSPENSION (ML) NASAL DAILY
Refills: 3 | Status: DISCONTINUED | OUTPATIENT
Start: 2021-04-10 | End: 2021-04-14 | Stop reason: HOSPADM

## 2021-04-10 RX ORDER — TAMSULOSIN HYDROCHLORIDE 0.4 MG/1
0.4 CAPSULE ORAL DAILY
Status: DISCONTINUED | OUTPATIENT
Start: 2021-04-10 | End: 2021-04-14 | Stop reason: HOSPADM

## 2021-04-10 RX ORDER — BENZTROPINE MESYLATE 1 MG/1
1 TABLET ORAL 2 TIMES DAILY
Status: DISCONTINUED | OUTPATIENT
Start: 2021-04-10 | End: 2021-04-14 | Stop reason: HOSPADM

## 2021-04-10 RX ORDER — NICOTINE 21 MG/24HR
1 PATCH, TRANSDERMAL 24 HOURS TRANSDERMAL DAILY
Status: DISCONTINUED | OUTPATIENT
Start: 2021-04-10 | End: 2021-04-14 | Stop reason: HOSPADM

## 2021-04-10 RX ORDER — HALOPERIDOL 5 MG
5 TABLET ORAL EVERY 6 HOURS PRN
Status: DISCONTINUED | OUTPATIENT
Start: 2021-04-10 | End: 2021-04-10

## 2021-04-10 RX ORDER — LISINOPRIL 20 MG/1
20 TABLET ORAL DAILY
Status: DISCONTINUED | OUTPATIENT
Start: 2021-04-10 | End: 2021-04-14 | Stop reason: HOSPADM

## 2021-04-10 RX ORDER — MAGNESIUM HYDROXIDE/ALUMINUM HYDROXICE/SIMETHICONE 120; 1200; 1200 MG/30ML; MG/30ML; MG/30ML
30 SUSPENSION ORAL PRN
Status: DISCONTINUED | OUTPATIENT
Start: 2021-04-10 | End: 2021-04-14 | Stop reason: HOSPADM

## 2021-04-10 RX ORDER — DIPHENHYDRAMINE HYDROCHLORIDE 50 MG/ML
50 INJECTION INTRAMUSCULAR; INTRAVENOUS EVERY 6 HOURS PRN
Status: DISCONTINUED | OUTPATIENT
Start: 2021-04-10 | End: 2021-04-14 | Stop reason: HOSPADM

## 2021-04-10 RX ORDER — DIPHENHYDRAMINE HCL 25 MG
50 TABLET ORAL EVERY 6 HOURS PRN
Status: DISCONTINUED | OUTPATIENT
Start: 2021-04-10 | End: 2021-04-14 | Stop reason: HOSPADM

## 2021-04-10 RX ORDER — HALOPERIDOL 5 MG/ML
10 INJECTION INTRAMUSCULAR EVERY 6 HOURS PRN
Status: DISCONTINUED | OUTPATIENT
Start: 2021-04-10 | End: 2021-04-14 | Stop reason: HOSPADM

## 2021-04-10 RX ORDER — HALOPERIDOL 5 MG
5 TABLET ORAL DAILY
Status: DISCONTINUED | OUTPATIENT
Start: 2021-04-10 | End: 2021-04-14 | Stop reason: HOSPADM

## 2021-04-10 RX ORDER — PROPRANOLOL HYDROCHLORIDE 10 MG/1
15 TABLET ORAL 3 TIMES DAILY
Status: DISCONTINUED | OUTPATIENT
Start: 2021-04-10 | End: 2021-04-14 | Stop reason: HOSPADM

## 2021-04-10 RX ORDER — TRAZODONE HYDROCHLORIDE 50 MG/1
50 TABLET ORAL NIGHTLY PRN
Status: DISCONTINUED | OUTPATIENT
Start: 2021-04-10 | End: 2021-04-14 | Stop reason: HOSPADM

## 2021-04-10 RX ORDER — QUETIAPINE FUMARATE 300 MG/1
300 TABLET, FILM COATED ORAL NIGHTLY
Status: DISCONTINUED | OUTPATIENT
Start: 2021-04-10 | End: 2021-04-11

## 2021-04-10 RX ORDER — LORAZEPAM 2 MG/ML
INJECTION INTRAMUSCULAR
Status: DISPENSED
Start: 2021-04-10 | End: 2021-04-10

## 2021-04-10 RX ORDER — DIPHENHYDRAMINE HYDROCHLORIDE 50 MG/ML
50 INJECTION INTRAMUSCULAR; INTRAVENOUS
Status: DISCONTINUED | OUTPATIENT
Start: 2021-04-10 | End: 2021-04-14 | Stop reason: HOSPADM

## 2021-04-10 RX ADMIN — LORAZEPAM 2 MG: 2 INJECTION INTRAMUSCULAR; INTRAVENOUS at 09:25

## 2021-04-10 RX ADMIN — QUETIAPINE FUMARATE 50 MG: 50 TABLET, EXTENDED RELEASE ORAL at 16:00

## 2021-04-10 RX ADMIN — MONTELUKAST SODIUM 10 MG: 10 TABLET ORAL at 21:11

## 2021-04-10 RX ADMIN — HALOPERIDOL 10 MG: 5 TABLET ORAL at 22:06

## 2021-04-10 RX ADMIN — PROPRANOLOL HYDROCHLORIDE 15 MG: 10 TABLET ORAL at 21:09

## 2021-04-10 RX ADMIN — HYDROXYZINE PAMOATE 50 MG: 25 CAPSULE ORAL at 14:33

## 2021-04-10 RX ADMIN — DIVALPROEX SODIUM 500 MG: 250 TABLET, DELAYED RELEASE ORAL at 14:35

## 2021-04-10 RX ADMIN — CETIRIZINE HYDROCHLORIDE 10 MG: 10 TABLET, FILM COATED ORAL at 14:36

## 2021-04-10 RX ADMIN — DIPHENHYDRAMINE HYDROCHLORIDE 50 MG: 25 TABLET ORAL at 22:05

## 2021-04-10 RX ADMIN — TAMSULOSIN HYDROCHLORIDE 0.4 MG: 0.4 CAPSULE ORAL at 16:00

## 2021-04-10 RX ADMIN — PROPRANOLOL HYDROCHLORIDE 15 MG: 10 TABLET ORAL at 14:39

## 2021-04-10 RX ADMIN — LISINOPRIL 20 MG: 20 TABLET ORAL at 14:34

## 2021-04-10 RX ADMIN — HALOPERIDOL 5 MG: 5 TABLET ORAL at 14:35

## 2021-04-10 RX ADMIN — BENZTROPINE MESYLATE 1 MG: 1 TABLET ORAL at 14:35

## 2021-04-10 RX ADMIN — FLUTICASONE PROPIONATE 2 SPRAY: 50 SPRAY, METERED NASAL at 14:33

## 2021-04-10 RX ADMIN — ZIPRASIDONE MESYLATE 20 MG: 20 INJECTION, POWDER, LYOPHILIZED, FOR SOLUTION INTRAMUSCULAR at 09:22

## 2021-04-10 RX ADMIN — DIVALPROEX SODIUM 500 MG: 250 TABLET, DELAYED RELEASE ORAL at 21:09

## 2021-04-10 RX ADMIN — BENZTROPINE MESYLATE 1 MG: 1 TABLET ORAL at 21:11

## 2021-04-10 RX ADMIN — QUETIAPINE FUMARATE 300 MG: 300 TABLET ORAL at 21:10

## 2021-04-10 RX ADMIN — Medication 5000 UNITS: at 14:34

## 2021-04-10 RX ADMIN — WATER 1.2 ML: 1 INJECTION INTRAMUSCULAR; INTRAVENOUS; SUBCUTANEOUS at 09:22

## 2021-04-10 ASSESSMENT — SLEEP AND FATIGUE QUESTIONNAIRES
DIFFICULTY STAYING ASLEEP: YES
AVERAGE NUMBER OF SLEEP HOURS: 6
DO YOU USE A SLEEP AID: YES
SLEEP PATTERN: DIFFICULTY FALLING ASLEEP;DISTURBED/INTERRUPTED SLEEP
RESTFUL SLEEP: NO
DO YOU HAVE DIFFICULTY SLEEPING: YES

## 2021-04-10 NOTE — ED NOTES
The pt was accepted to [facility] room 7511. Disposition called to [place] in admitting. Accepting info given to Lita Aponte at [facility]. N to N to be called to [phone].      Rola Marinelli, [facility]  04/09/21 2125

## 2021-04-10 NOTE — GROUP NOTE
Group Therapy Note    Date: 4/10/2021    Group Start Time: 1030  Group End Time: 7024  Group Topic: Psychoeducation    [facility] 7SE ACUTE [address]        Group Therapy Note    Attendees: 7         Notes: Minimally engaged during discussion on building new habits. Quiet during peers sharing, as feels drowsy from earlier medication and poor sleep.       Status After Intervention:  Unchanged    Participation Level: Minimal    Participation Quality: Lethargic      Speech:  hesitant      Thought Process/Content: Perseverating      Affective Functioning: Flat      Mood: depressed and irritable      Level of consciousness:  Drowsy      Response to Learning: Progressing to goal      Endings: None Reported    Modes of Intervention: Education, Support, Socialization and Exploration      Discipline Responsible: Psychoeducational Specialist      Signature:  Mukesh Lopez

## 2021-04-10 NOTE — PROGRESS NOTES
Patient came to nurses station and stated he was feeling \"very sleeping\", this staff spoke with patient and stated it was most likely due the PRN medications he received. This staff went to assess patient vitals and patient apologized for being disruptive and his behavior and stated medications did help. This staff told patient to rest and I will continue to monitor for any changes.
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
## 2021-04-10 NOTE — PROGRESS NOTES
Unable to verify medications, as patients uses [facility] [phone] and they are closed for the weekend, will reopen on Monday. I will attempt to contact [facility] and speak with the nurse if one is available. Per patient electronic chart, Kartik Lund is listed as a guardian, no paperwork in soft chart.

## 2021-04-10 NOTE — PROGRESS NOTES
`[facility]  Admission Note     51yo male admitted from [facility] with dx of schizoaffective disorder. Upon arrival, patient was calm and cooperative. Once admission started, patients mood became irritable. Patient was guarded and evasive. Only stating that the reason he is here is because of \"a family issue\". Patient denies suicidal ideations and hallucinations. Admitted to having homicidal ideations towards \"a bitch named Daphnie\" at the [facility] in [place]. He stated that he would \"kill her\". Patient speech was disorganized and difficult to follow. He rated his anxiety 8/10 and depression 2/10 d/t \"this whole ordeal and my family\". Patient went on stating that he wants to new guardian and does not want to go back to [facility] because hes here there for 4 years-and that's his limit on places to stay. Patient admitted to not taking his medications properly, but has his Aristada injection due next Wednesday at [facility]. Patient wanted something for anxiety and to help him sleep-prn vistaril and trazodone offered. Patient became upset stating Diana Paddy dont work and I'll refuse my meds if I dont get something to help me\". This nurse left room to review med list-5 min later returned to room and patient was asleep. Will continue to monitor. Admission Type:   Admission Type: Involuntary    Reason for admission:  Reason for Admission: \"I dont want to talk about it.  Its a family issue\"    PATIENT STRENGTHS:  Strengths: No significant Physical Illness, Connection to output provider    Patient Strengths and Limitations:  Limitations: Apathetic / unmotivated, Tendency to isolate self, Unrealistic self-view, General negative or hopeless attitude about future/recovery    Addictive Behavior:   Addictive Behavior  In the past 3 months, have you felt or has someone told you that you have a problem with:  : None  Do you have a history of Chemical Use?: No  Do you have a history of Alcohol Use?: No  Do you have a history of Street Drug Abuse?: No  Histroy of Prescripton Drug Abuse?: No    Medical Problems:   Past Medical History:   Diagnosis Date    Anxiety     Arthritis     Hyperlipidemia     Hypertension     Impulse control disease     Schizo affective schizophrenia ([facility] [address]        Status EXAM:  Status and Exam  Normal: No  Facial Expression: Avoids Gaze, Flat  Affect: Unstable  Level of Consciousness: Alert  Mood:Normal: No  Mood: Depressed, Irritable  Motor Activity:Normal: No  Motor Activity: Agitated  Interview Behavior: Irritable, Cooperative  Preception: Mullan to Person, Bashir Ditto to Time, Mullan to Place, Mullan to Situation  Attention:Normal: No  Attention: Distractible  Thought Processes: Other(See comment)(Impaired)  Thought Content:Normal: No  Thought Content: Paranoia, Poverty of Content, Preoccupations  Hallucinations: None  Delusions: Yes  Delusions: Obsessions  Memory:Normal: No  Memory: Poor Recent, Poor Remote  Insight and Judgment: No  Insight and Judgment: Poor Judgment, Poor Insight, Unrealistic  Present Suicidal Ideation: No  Present Homicidal Ideation: Yes    Tobacco Screening:  Practical Counseling, on admission, ross X, if applicable and completed (first 3 are required if patient doesn't refuse):            (x)  Recognizing danger situations (included triggers and roadblocks)                    (x)  Coping skills (new ways to manage stress, exercise, relaxation techniques, changing routine, distraction)                                                           (x)  Basic information about quitting (benefits of quitting, techniques in how to quit, available resources  (x) Referral for counseling faxed to [facility]                                           ( ) Patient refused counseling  ( ) Patient has not smoked in the last 30 days    Metabolic Screening:    No results found for: LABA1C    Lab Results   Component Value Date    CHOL 197 08/07/2019    CHOL 158

## 2021-04-10 NOTE — PROGRESS NOTES
Leisure assessment incomplete. Observed to be agitated and uncooperative to conversation. Will continue to encourage completion as tolerance improves.

## 2021-04-10 NOTE — H&P
Department of Psychiatry  History and Physical - Adult     CHIEF COMPLAINT:       Patient was seen after discussing with the treatment team and reviewing the chart    CIRCUMSTANCES OF ADMISSION:   Pt presents pinkslipped by Crisis Intervention Worker The Judith Alexy pinkslip \"Client has not been taking his oral medications. Client has been expressing delusional thinking where someone is breaking into his apartment, stealing his food a \"Daphnie Saucedo\" client refusing to get his medication stating\"whores are wondering the halls\"  Client in need of assesment, concerns that he could harm himself/other if this escalates. HISTORY OF PRESENT ILLNESS:      The patient is a 53 y.o. male with significant past history of schizoaffective disorder presents pinkslipped by Crisis Intervention Worker The [employer] Per pinkslip \"Client has not been taking his oral medications. Client has been expressing delusional thinking where someone is breaking into his apartment, stealing his food a \"Daphnie Saucedo\" client refusing to get his medication stating\"whores are wondering the halls\"  Client in need of assesment, concerns that he could harm himself/other if this escalates. Pt Legal Daly Egan [phone] Pt will need further evaluation via Telehealth for appropriate disposition. (End ED note)   Per ER note, patient arrived via EMS on a pink slip from the group home that he has been living in for approximately 40 years. He does not like his current living arrangements. It is reported that he does not trust the staff, or does he like his legal guardian and he would like to get away from the area. Per patient report he has not been taking his psychotropic medications because he does not trust the staff. Pt states there are issues in his life right now that he cannot discuss because he doesn't feel rational at the moment.  Pt denies SI/HI at this time, but states earlier today he threatened the \"\" saying that he will take her life if she came at him. (End ED note)    Past psychiatric history: Patient has a diagnosis of schizoaffective disorder, he has resided in a group home for the last 40 years. He has become noncompliant with his medications and treatment due to paranoia. And he has been pink slipped by crisis intervention worker. Legal history: Unknown    Substance abuse history: Unknown    Personal family social history:   Patient has resided in a group home for the last 40 years due to his diagnosis of schizoaffective disorder. MSE:   Mental status examination reveals a 71-year-old  male in hospital attire resting on his bed, he is superficially forthcoming and cooperative for assessment. Psychomotor reveals no agitation, retardation or abnormal posture. Speech is clear coherent he is able to answer questions with relevance there is no delayed or long latency of response. Eye contact is good throughout assessment. Mood is \"anxious. \"  Affect is guarded and anxious. Thought process is linear and goal-directed there are no looseness of associations or flight of ideas. Thought content is paranoid, the patient is devoid of suicidal or homicidal ideation intent or plan. Devoid of auditory or visual hallucinations. There are no neurovegetative signs of depression asides from anxiety. Cognitive function appears to be at baseline. Memory is intact through conversation. Insight judgment and impulse control are poor. Patient is alert and oriented to person place time and situation and easily able to recount events leading to his hospitalization. Past Medical History:        Diagnosis Date    Anxiety     Arthritis     Hyperlipidemia     Hypertension     Impulse control disease     Schizo affective schizophrenia ([facility] [address]        Medications Prior to Admission:   Medications Prior to Admission: gabapentin (NEURONTIN) 400 MG capsule, Take 1 capsule by mouth 4 times daily for 90 days.   omeprazole (PRILOSEC) 40 MG delayed release capsule, Take 1 capsule by mouth 2 times daily  sertraline (ZOLOFT) 25 MG tablet, Take 1 tablet by mouth daily  montelukast (SINGULAIR) 10 MG tablet, Take 1 tablet by mouth nightly  lisinopril (PRINIVIL;ZESTRIL) 20 MG tablet, Take 1 tablet by mouth daily  loratadine (CLARITIN) 10 MG tablet, Take 1 tablet by mouth daily  Cholecalciferol (VITAMIN D3) 125 MCG (5000 UT) TABS, Take 1 tablet by mouth daily  methocarbamol (ROBAXIN) 500 MG tablet, take 1-2 tablet by mouth three times a day  mometasone (NASONEX) 50 MCG/ACT nasal spray, 2 sprays by Nasal route daily  Urea (CARMOL) 40 % cream, Apply twice daily bilateral feet. Risa . 28.3g tube. Urea 39 % CREA, Apply 1 Tube topically 2 times daily  propranolol (INDERAL) 10 MG tablet, Take one and one half tablet by mouth three times daily. divalproex (DEPAKOTE) 500 MG DR tablet,   RA MELATONIN 10 MG TABS,   QUEtiapine (SEROQUEL) 400 MG tablet, Take 300 mg by mouth nightly   ARISTADA 1064 MG/3.9ML PRSY injection, Inject 1 each as directed Every 2 months  ammonium lactate (LAC-HYDRIN) 12 % lotion, Apply topically twice daily  haloperidol (HALDOL) 5 MG tablet, Take 5 mg by mouth daily  tamsulosin (FLOMAX) 0.4 MG capsule, Take 1 capsule by mouth daily  benztropine (COGENTIN) 1 MG tablet, Take 1 mg by mouth 2 times daily    Past Surgical History:        Procedure Laterality Date    TONSILLECTOMY         Allergies:   Lithium, Wellbutrin [bupropion], Zyprexa [olanzapine], and Tegretol [carbamazepine]    Family History  Family History   Problem Relation Age of Onset    No Known Problems Mother     No Known Problems Sister              EXAMINATION:    REVIEW OF SYSTEMS:    ROS:  [x] All negative/unchanged except if checked.  Explain positive(checked items) below:  [] Constitutional  [] Eyes  [] Ear/Nose/Mouth/Throat  [] Respiratory  [] CV  [] GI  []   [] Musculoskeletal  [] Skin/Breast  [] Neurological  [] Endocrine  [] Heme/Lymph  [] Allergic/Immunologic    Explanation:     Vitals:  /86   Pulse 92   Temp 97.7 °F (36.5 °C) (Temporal)   Resp 18   Ht 6' (1.829 m)   Wt 278 lb (126.1 kg)   SpO2 98%   BMI 37.70 kg/m²      Physical Examination:   Head: x  Atraumatic: x normocephalic  Skin and Mucosa        Moist x  Dry   Pale  x Normal   Neck:  Thyroid  Palpable   x  Not palpable   venus distention   adenopathy   Chest: x Clear   Rhonchi     Wheezing   CV:  xS1   xS2    xNo murmer   Abdomen:  x  Soft    Tender    Viceromegaly   Extremities:  x No Edema     Edema     Cranial Nerves Examination:   CN II:   xPupils are reactive to light  Pupils are non reactive to light  CN III, IV, VI:  xNo eye deviation    No diplopia or ptosis   CN V:    xFacial Sensation is intact     Facial Sensation is not intact   CN IIIV:   x Hearing is normal to rubbing fingers   CN IX, X:     xNormal gag reflex and phonation   CN XI:   xShoulder shrug and neck rotation is normal  CNXII:    xTongue is midline no deviation or atrophy      DIAGNOSIS:  Schizoaffective disorder          LABS: REVIEWED TODAY:  Recent Labs     04/09/21  1533   WBC 11.5   HGB 13.8        Recent Labs     04/09/21  1533      K 4.6      CO2 27   BUN 11   CREATININE 0.6*   GLUCOSE 127*     Recent Labs     04/09/21  1533   BILITOT 0.3   ALKPHOS 79   AST 24   ALT 39     Lab Results   Component Value Date    LABAMPH NOT DETECTED 04/09/2021    LABAMPH NOT DETECTED 06/19/2011    BARBSCNU NOT DETECTED 04/09/2021    LABBENZ NOT DETECTED 04/09/2021    LABBENZ POSITIVE 06/19/2011    CANNAB NOT DETECTED  06/19/2011    LABMETH NOT DETECTED 04/09/2021    OPIATESCREENURINE NOT DETECTED 04/09/2021    PHENCYCLIDINESCREENURINE NOT DETECTED 04/09/2021    ETOH <10 04/09/2021     Lab Results   Component Value Date    TSH 1.190 08/07/2019     Lab Results   Component Value Date    LITHIUM 0.99 09/15/2011     Lab Results   Component Value Date    VALPROATE 81 08/07/2019     Lab Results   Component Value Date    LITHIUM 0.99 09/15/2011    VALPROATE 81 08/07/2019         Radiology No results found. TREATMENT PLAN:  The patient's diagnosis, treatment plan, medication management were formulated after patient was seen directly by the attending physician and myself and all relevant documentation was reviewed. Risk Management: Based on the diagnosis and assessment biopsychosocial treatment model was presented to the patient and was given the opportunity to ask any question. The patient was agreeable to the plan and all the patient's questions were answered to the patient's satisfaction. I discussed with the patient the risk, benefit, alternative and common side effects for the proposed medication treatment. The patient is consenting to this treatment. Collateral Information:  Will obtain collateral information from the family or friends. Will obtain medical records as appropriate from out patient providers  Will consult the hospitalist for a physical exam to rule out any co-morbid physical condition. Home medication Reconciled   Continue Cogentin 1 mg twice daily for akathisia  Continue Depakote 500 mg 3 times daily for mood stabilization  Continue Haldol 5 mg daily for psychosis  Continue Seroquel 300 mg nightly  Other medications continued as indicated  New Medications started during this admission :    Begin Seroquel XR 50 mg daily    Prn Haldol 5mg and Vistaril 50mg q6hr for extreme agitation.   Trazodone as ordered for insomnia  Vistaril as ordered for anxiety      Psychotherapy:   Encourage participation in milieu and group therapy  Individual therapy as needed              Behavioral Services  Medicare Certification Upon Admission    I certify that this patient's inpatient psychiatric hospital admission is medically necessary for:    [x] (1) Treatment which could reasonably be expected to improve this patient's condition,       [x] (2) Or for diagnostic study;     AND     [x](2) The inpatient psychiatric services are provided while the individual is under the care of a physician and are included in the individualized plan of care. Estimated length of stay/service 3 to 5 days based on stability    Plan for post-hospital care follow-up with outpatient provider.     Electronically signed by Elnoria Kehr, APRN - CNP on 4/10/2021 at 1:02 PM

## 2021-04-10 NOTE — PROGRESS NOTES
Patient at nurses station threatening to destroy the unit and there will \"be a blood bath\" if his request are not immediately met. Patient was agitated and medicated per MAR.

## 2021-04-11 LAB
CHOLESTEROL, TOTAL: 139 MG/DL (ref 0–199)
HBA1C MFR BLD: 6.9 % (ref 4–5.6)
HDLC SERPL-MCNC: 30 MG/DL
LDL CHOLESTEROL CALCULATED: 66 MG/DL (ref 0–99)
METER GLUCOSE: 101 MG/DL (ref 74–99)
METER GLUCOSE: 119 MG/DL (ref 74–99)
TRIGL SERPL-MCNC: 217 MG/DL (ref 0–149)
VLDLC SERPL CALC-MCNC: 43 MG/DL

## 2021-04-11 PROCEDURE — 6370000000 HC RX 637 (ALT 250 FOR IP): Performed by: PSYCHIATRY & NEUROLOGY

## 2021-04-11 PROCEDURE — 6370000000 HC RX 637 (ALT 250 FOR IP): Performed by: NURSE PRACTITIONER

## 2021-04-11 PROCEDURE — 82962 GLUCOSE BLOOD TEST: CPT

## 2021-04-11 PROCEDURE — 6360000002 HC RX W HCPCS: Performed by: NURSE PRACTITIONER

## 2021-04-11 PROCEDURE — 80061 LIPID PANEL: CPT

## 2021-04-11 PROCEDURE — 83036 HEMOGLOBIN GLYCOSYLATED A1C: CPT

## 2021-04-11 PROCEDURE — 1240000000 HC EMOTIONAL WELLNESS R&B

## 2021-04-11 PROCEDURE — 99232 SBSQ HOSP IP/OBS MODERATE 35: CPT | Performed by: PSYCHIATRY & NEUROLOGY

## 2021-04-11 PROCEDURE — 36415 COLL VENOUS BLD VENIPUNCTURE: CPT

## 2021-04-11 RX ORDER — LANOLIN ALCOHOL/MO/W.PET/CERES
6 CREAM (GRAM) TOPICAL NIGHTLY
Status: DISCONTINUED | OUTPATIENT
Start: 2021-04-11 | End: 2021-04-14 | Stop reason: HOSPADM

## 2021-04-11 RX ORDER — QUETIAPINE FUMARATE 200 MG/1
400 TABLET, FILM COATED ORAL NIGHTLY
Status: DISCONTINUED | OUTPATIENT
Start: 2021-04-11 | End: 2021-04-14 | Stop reason: HOSPADM

## 2021-04-11 RX ADMIN — PROPRANOLOL HYDROCHLORIDE 15 MG: 10 TABLET ORAL at 08:57

## 2021-04-11 RX ADMIN — Medication 5000 UNITS: at 08:58

## 2021-04-11 RX ADMIN — DIVALPROEX SODIUM 500 MG: 250 TABLET, DELAYED RELEASE ORAL at 13:18

## 2021-04-11 RX ADMIN — BENZTROPINE MESYLATE 1 MG: 1 TABLET ORAL at 21:28

## 2021-04-11 RX ADMIN — QUETIAPINE FUMARATE 50 MG: 50 TABLET, EXTENDED RELEASE ORAL at 08:57

## 2021-04-11 RX ADMIN — PANTOPRAZOLE SODIUM 40 MG: 40 TABLET, DELAYED RELEASE ORAL at 06:59

## 2021-04-11 RX ADMIN — FLUTICASONE PROPIONATE 2 SPRAY: 50 SPRAY, METERED NASAL at 08:59

## 2021-04-11 RX ADMIN — DIVALPROEX SODIUM 500 MG: 250 TABLET, DELAYED RELEASE ORAL at 06:59

## 2021-04-11 RX ADMIN — LORAZEPAM 2 MG: 2 INJECTION INTRAMUSCULAR; INTRAVENOUS at 00:46

## 2021-04-11 RX ADMIN — TAMSULOSIN HYDROCHLORIDE 0.4 MG: 0.4 CAPSULE ORAL at 16:49

## 2021-04-11 RX ADMIN — TRAZODONE HYDROCHLORIDE 50 MG: 50 TABLET ORAL at 21:28

## 2021-04-11 RX ADMIN — HYDROXYZINE PAMOATE 50 MG: 25 CAPSULE ORAL at 10:05

## 2021-04-11 RX ADMIN — BENZTROPINE MESYLATE 1 MG: 1 TABLET ORAL at 09:00

## 2021-04-11 RX ADMIN — PROPRANOLOL HYDROCHLORIDE 15 MG: 10 TABLET ORAL at 21:27

## 2021-04-11 RX ADMIN — CETIRIZINE HYDROCHLORIDE 10 MG: 10 TABLET, FILM COATED ORAL at 08:57

## 2021-04-11 RX ADMIN — MONTELUKAST SODIUM 10 MG: 10 TABLET ORAL at 21:27

## 2021-04-11 RX ADMIN — DIVALPROEX SODIUM 500 MG: 250 TABLET, DELAYED RELEASE ORAL at 21:28

## 2021-04-11 RX ADMIN — Medication 6 MG: at 21:28

## 2021-04-11 RX ADMIN — QUETIAPINE FUMARATE 400 MG: 200 TABLET ORAL at 21:27

## 2021-04-11 RX ADMIN — HALOPERIDOL 5 MG: 5 TABLET ORAL at 08:57

## 2021-04-11 RX ADMIN — PROPRANOLOL HYDROCHLORIDE 15 MG: 10 TABLET ORAL at 13:18

## 2021-04-11 RX ADMIN — LISINOPRIL 20 MG: 20 TABLET ORAL at 08:57

## 2021-04-11 ASSESSMENT — PAIN SCALES - GENERAL: PAINLEVEL_OUTOF10: 0

## 2021-04-11 NOTE — GROUP NOTE
Group Therapy Note    Date: 4/11/2021    Group Start Time: 1315  Group End Time: 1400  Group Topic: Cognitive Skills    [facility]    GERSON Napier, ZOË        Group Therapy Note    Number of participants:13  Type of group: Cognitive Skills  Mode of intervention: Education, Support, Socialization, Exploration, Clarifying, and Problem-solving  Topic: Communication skills  Objective: To improve interpersonal relationships       Notes:  Pt was an active participant in cognitive skills group focusing on communication skills and improving interpersonal relationships. Pt was able to share personal information and provide supportive feedback to group members. Status After Intervention:  Improved    Participation Level:  Active Listener and Interactive    Participation Quality: Appropriate and Attentive      Speech:  normal      Thought Process/Content: Logical      Affective Functioning: Congruent      Mood: euthymic      Level of consciousness:  Alert, Oriented x4 and Attentive      Response to Learning: Progressing to goal      Endings: None Reported    Modes of Intervention: Education, Support, Socialization, Exploration, Clarifying and Problem-solving      Discipline Responsible: /Counselor      Signature:  GERSON Napier, Fabrizio Melton

## 2021-04-11 NOTE — GROUP NOTE
Group Therapy Note    Date: 4/11/2021    Group Start Time: 1000  Group End Time: 1030  Group Topic: Psychoeducation    [facility]    Magnolia Rodriguez, CTRS        Group Therapy Note      Number of participants: 11  Type of group: Psychoeducation  Mode of intervention: Education, Support, Socialization, Exploration, Clarifying, Problem-solving, and Activity  Topic: Positive Attitude Ball  Objective: Pt will identify 1 way to maintain a positive attitude in recovery. Notes:  Pt was interactive during group sharing 1 way to maintain a positive attitude in recovery. Pt gave support and feedback to others. Enjoyed positive attitude ball. Status After Intervention:  Improved    Participation Level:  Active Listener and Interactive    Participation Quality: Appropriate, Attentive, Sharing and Supportive      Speech:  normal      Thought Process/Content: Logical      Affective Functioning: Congruent      Mood: euthymic      Level of consciousness:  Alert, Oriented x4 and Attentive      Response to Learning: Able to verbalize current knowledge/experience, Able to verbalize/acknowledge new learning, Able to retain information, Capable of insight, Able to change behavior and Progressing to goal      Endings: None Reported    Modes of Intervention: Education, Support, Socialization, Exploration, Clarifying, Problem-solving and Activity

## 2021-04-11 NOTE — PROGRESS NOTES
BEHAVIORAL HEALTH FOLLOW-UP NOTE     4/11/2021     Patient was seen via tele psychiatry from home by video and examined in person, Chart reviewed. Patient's case discussed with staff/team    Chief Complaint: \"I am sorry from behavior yesterday\"    Interim History: Patient seen this morning. Patient was superficially cooperative. Patient was very irritable and agitated yesterday and received as needed's of Haldol Ativan and Benadryl last night due to agitation. Patient says he feels much better today. Patient is requesting his Seroquel be increased to 400 mg at night which is what he takes at home. Patient is also convinced he is due for his Aristada injection on Wednesday he was told that this will be confirmed by his outpatient provider and pharmacy. Patient appears labile and guarded says that he has significant anxiety problems denies auditory visual hallucinations suicidal ideations at this time. Insight and judgment is poor and impulse control is still poor patient is very labile. At one moment he appears cooperative however in the next moment he can become aggressive.     Appetite:   [x] Normal/Unchanged  [] Increased  [] Decreased      Sleep:       [x] Normal/Unchanged  [] Fair       [] Poor              Energy:    [x] Normal/Unchanged  [] Increased  [] Decreased        SI [] Present  [x] Absent    HI  []Present  [x] Absent     Aggression:  [x] yes  [] no    Patient is [x] able  [] unable to CONTRACT FOR SAFETY     PAST MEDICAL/PSYCHIATRIC HISTORY:   Past Medical History:   Diagnosis Date    Anxiety     Arthritis     Hyperlipidemia     Hypertension     Impulse control disease     Schizo affective schizophrenia ([facility] [address]        FAMILY/SOCIAL HISTORY:  Family History   Problem Relation Age of Onset    No Known Problems Mother     No Known Problems Sister      Social History     Socioeconomic History    Marital status: Single     Spouse name: Not on file    Number of children: Not on file    Years of education: Not on file    Highest education level: Not on file   Occupational History     Employer: NOT EMPLOYED   Social Needs    Financial resource strain: Not on file    Food insecurity     Worry: Not on file     Inability: Not on file    Transportation needs     Medical: Not on file     Non-medical: Not on file   Tobacco Use    Smoking status: Current Every Day Smoker     Packs/day: 1.50     Years: 9.00     Pack years: 13.50     Types: Cigarettes     Start date: 2/18/2000    Smokeless tobacco: Never Used   Substance and Sexual Activity    Alcohol use: Not Currently    Drug use: Not Currently    Sexual activity: Not Currently   Lifestyle    Physical activity     Days per week: Not on file     Minutes per session: Not on file    Stress: Not on file   Relationships    Social connections     Talks on phone: Not on file     Gets together: Not on file     Attends [employer] service: Not on file     Active member of club or organization: Not on file     Attends meetings of clubs or organizations: Not on file     Relationship status: Not on file    Intimate partner violence     Fear of current or ex partner: Not on file     Emotionally abused: Not on file     Physically abused: Not on file     Forced sexual activity: Not on file   Other Topics Concern    Not on file   Social History Narrative    Not on file           ROS:  [x] All negative/unchanged except if checked.  Explain positive(checked items) below:  [] Constitutional  [] Eyes  [] Ear/Nose/Mouth/Throat  [] Respiratory  [] CV  [] GI  []   [] Musculoskeletal  [] Skin/Breast  [] Neurological  [] Endocrine  [] Heme/Lymph  [] Allergic/Immunologic    Explanation:     MEDICATIONS:    Current Facility-Administered Medications:     melatonin tablet 6 mg, 6 mg, Oral, Nightly, GRISELDA Lima CNP    QUEtiapine (SEROQUEL) tablet 400 mg, 400 mg, Oral, Nightly, Omar Garland MD    acetaminophen (TYLENOL) tablet 650 mg, 650 mg, Oral, Q6H PRN, Ralph Iqbal MD    magnesium hydroxide (MILK OF MAGNESIA) 400 MG/5ML suspension 30 mL, 30 mL, Oral, Daily PRN, Ralph Iqbal MD    aluminum & magnesium hydroxide-simethicone (MAALOX) 200-200-20 MG/5ML suspension 30 mL, 30 mL, Oral, PRN, Ralph Iqbal MD    hydrOXYzine (VISTARIL) capsule 50 mg, 50 mg, Oral, TID PRN, Ralph Iqbal MD, 50 mg at 04/11/21 1005    traZODone (DESYREL) tablet 50 mg, 50 mg, Oral, Nightly PRN, Ralph Iqbal MD    nicotine (NICODERM CQ) 21 MG/24HR 1 patch, 1 patch, Transdermal, Daily, Ralph Iqbal MD, 1 patch at 04/11/21 0900    ziprasidone (GEODON) injection 20 mg, 20 mg, Intramuscular, Q6H PRN, 20 mg at 04/10/21 0922 **AND** [COMPLETED] sterile water injection 1.2 mL, 1.2 mL, Intramuscular, Once, James Craze, APRN - CNP, 1.2 mL at 04/10/21 1223    haloperidol lactate (HALDOL) injection 10 mg, 10 mg, Intramuscular, Q6H PRN **OR** haloperidol (HALDOL) tablet 10 mg, 10 mg, Oral, Q6H PRN, [place] Craze, APRN - CNP, 10 mg at 04/10/21 2206    diphenhydrAMINE (BENADRYL) injection 50 mg, 50 mg, Intramuscular, Q6H PRN **OR** diphenhydrAMINE (BENADRYL) tablet 50 mg, 50 mg, Oral, Q6H PRN, [place] Craze, APRN - CNP, 50 mg at 04/10/21 2205    LORazepam (ATIVAN) injection 2 mg, 2 mg, Intramuscular, Q6H PRN, [place] Craze, APRN - CNP, 2 mg at 04/11/21 0046    benztropine (COGENTIN) tablet 1 mg, 1 mg, Oral, BID, James Craze, APRN - CNP, 1 mg at 04/11/21 0900    vitamin D (CHOLECALCIFEROL) tablet 5,000 Units, 5,000 Units, Oral, Daily, GRISELDA Tsai - CNP, 5,000 Units at 04/11/21 0858    divalproex (DEPAKOTE) DR tablet 500 mg, 500 mg, Oral, 3 times per day, GRISELDA Tsai - CNP, 500 mg at 04/11/21 0659    haloperidol (HALDOL) tablet 5 mg, 5 mg, Oral, Daily, GRISELDA Tsai - CNP, 5 mg at 04/11/21 0857    lisinopril (PRINIVIL;ZESTRIL) tablet 20 mg, 20 mg, Oral, Daily, GRISELDA Tsai - CNP, 20 mg at 04/11/21 0857    cetirizine Anticipated Length of stay: 3-7 days            Electronically signed by Celestine Frazier MD on 4/11/2021 at 11:20 AM

## 2021-04-12 LAB
METER GLUCOSE: 106 MG/DL (ref 74–99)
METER GLUCOSE: 132 MG/DL (ref 74–99)
METER GLUCOSE: 138 MG/DL (ref 74–99)
METER GLUCOSE: 175 MG/DL (ref 74–99)

## 2021-04-12 PROCEDURE — 1240000000 HC EMOTIONAL WELLNESS R&B

## 2021-04-12 PROCEDURE — 6370000000 HC RX 637 (ALT 250 FOR IP): Performed by: PSYCHIATRY & NEUROLOGY

## 2021-04-12 PROCEDURE — 6370000000 HC RX 637 (ALT 250 FOR IP): Performed by: NURSE PRACTITIONER

## 2021-04-12 PROCEDURE — 99232 SBSQ HOSP IP/OBS MODERATE 35: CPT | Performed by: NURSE PRACTITIONER

## 2021-04-12 PROCEDURE — 82962 GLUCOSE BLOOD TEST: CPT

## 2021-04-12 RX ORDER — ARIPIPRAZOLE LAUROXIL 882 MG/3.2ML
882 INJECTION, SUSPENSION, EXTENDED RELEASE INTRAMUSCULAR
COMMUNITY

## 2021-04-12 RX ORDER — MIRTAZAPINE 15 MG/1
7.5 TABLET, FILM COATED ORAL NIGHTLY PRN
Status: ON HOLD | COMMUNITY
End: 2021-04-14 | Stop reason: HOSPADM

## 2021-04-12 RX ORDER — NICOTINE 21 MG/24HR
1 PATCH, TRANSDERMAL 24 HOURS TRANSDERMAL EVERY 24 HOURS
COMMUNITY

## 2021-04-12 RX ORDER — GABAPENTIN 400 MG/1
400 CAPSULE ORAL 4 TIMES DAILY
Status: DISCONTINUED | OUTPATIENT
Start: 2021-04-12 | End: 2021-04-14 | Stop reason: HOSPADM

## 2021-04-12 RX ORDER — ATORVASTATIN CALCIUM 20 MG/1
20 TABLET, FILM COATED ORAL DAILY
COMMUNITY

## 2021-04-12 RX ORDER — ALBUTEROL SULFATE 90 UG/1
2 AEROSOL, METERED RESPIRATORY (INHALATION) EVERY 6 HOURS PRN
COMMUNITY

## 2021-04-12 RX ADMIN — FLUTICASONE PROPIONATE 2 SPRAY: 50 SPRAY, METERED NASAL at 09:29

## 2021-04-12 RX ADMIN — GABAPENTIN 400 MG: 400 CAPSULE ORAL at 20:48

## 2021-04-12 RX ADMIN — PROPRANOLOL HYDROCHLORIDE 15 MG: 10 TABLET ORAL at 09:28

## 2021-04-12 RX ADMIN — BENZTROPINE MESYLATE 1 MG: 1 TABLET ORAL at 09:28

## 2021-04-12 RX ADMIN — DIVALPROEX SODIUM 500 MG: 250 TABLET, DELAYED RELEASE ORAL at 21:20

## 2021-04-12 RX ADMIN — QUETIAPINE FUMARATE 400 MG: 200 TABLET ORAL at 20:48

## 2021-04-12 RX ADMIN — PROPRANOLOL HYDROCHLORIDE 15 MG: 10 TABLET ORAL at 21:19

## 2021-04-12 RX ADMIN — Medication 5000 UNITS: at 09:29

## 2021-04-12 RX ADMIN — MONTELUKAST SODIUM 10 MG: 10 TABLET ORAL at 20:49

## 2021-04-12 RX ADMIN — GABAPENTIN 400 MG: 400 CAPSULE ORAL at 16:35

## 2021-04-12 RX ADMIN — TAMSULOSIN HYDROCHLORIDE 0.4 MG: 0.4 CAPSULE ORAL at 16:35

## 2021-04-12 RX ADMIN — DIVALPROEX SODIUM 500 MG: 250 TABLET, DELAYED RELEASE ORAL at 14:28

## 2021-04-12 RX ADMIN — HALOPERIDOL 5 MG: 5 TABLET ORAL at 09:29

## 2021-04-12 RX ADMIN — TRAZODONE HYDROCHLORIDE 50 MG: 50 TABLET ORAL at 20:49

## 2021-04-12 RX ADMIN — DIVALPROEX SODIUM 500 MG: 250 TABLET, DELAYED RELEASE ORAL at 09:29

## 2021-04-12 RX ADMIN — HYDROXYZINE PAMOATE 50 MG: 25 CAPSULE ORAL at 00:25

## 2021-04-12 RX ADMIN — LISINOPRIL 20 MG: 20 TABLET ORAL at 09:29

## 2021-04-12 RX ADMIN — CETIRIZINE HYDROCHLORIDE 10 MG: 10 TABLET, FILM COATED ORAL at 09:29

## 2021-04-12 RX ADMIN — PANTOPRAZOLE SODIUM 40 MG: 40 TABLET, DELAYED RELEASE ORAL at 06:59

## 2021-04-12 RX ADMIN — Medication 6 MG: at 20:49

## 2021-04-12 RX ADMIN — PROPRANOLOL HYDROCHLORIDE 15 MG: 10 TABLET ORAL at 14:28

## 2021-04-12 RX ADMIN — BENZTROPINE MESYLATE 1 MG: 1 TABLET ORAL at 20:49

## 2021-04-12 RX ADMIN — METFORMIN HYDROCHLORIDE 500 MG: 500 TABLET ORAL at 16:35

## 2021-04-12 ASSESSMENT — PAIN SCALES - GENERAL
PAINLEVEL_OUTOF10: 0
PAINLEVEL_OUTOF10: 0

## 2021-04-12 NOTE — GROUP NOTE
Group Therapy Note    Date: 4/12/2021    Group Start Time: 1115  Group End Time: 1200  Group Topic: Cognitive Skills    [facility]    GERSON Meek, ZOË        Group Therapy Note    Attendees:11         Patient's Goal:  To Understand the benefits of self care. Notes: Pt provided good insight on the topic and shared positive feedback with his peers. Status After Intervention:  Improved    Participation Level:  Active Listener    Participation Quality: Appropriate      Speech:  normal      Thought Process/Content: Logical      Affective Functioning: Congruent      Mood: depressed      Level of consciousness:  Alert, Oriented x4 and Attentive      Response to Learning: Able to verbalize current knowledge/experience      Endings: None Reported    Modes of Intervention: Education      Discipline Responsible: /Counselor      Signature:  GERSON Meek, ZOË

## 2021-04-12 NOTE — PROGRESS NOTES
Currently    Sexual activity: Not Currently   Lifestyle    Physical activity     Days per week: Not on file     Minutes per session: Not on file    Stress: Not on file   Relationships    Social connections     Talks on phone: Not on file     Gets together: Not on file     Attends Sikhism service: Not on file     Active member of club or organization: Not on file     Attends meetings of clubs or organizations: Not on file     Relationship status: Not on file    Intimate partner violence     Fear of current or ex partner: Not on file     Emotionally abused: Not on file     Physically abused: Not on file     Forced sexual activity: Not on file   Other Topics Concern    Not on file   Social History Narrative    Not on file           ROS:  [x] All negative/unchanged except if checked.  Explain positive(checked items) below:  [] Constitutional  [] Eyes  [] Ear/Nose/Mouth/Throat  [] Respiratory  [] CV  [] GI  []   [] Musculoskeletal  [] Skin/Breast  [] Neurological  [] Endocrine  [] Heme/Lymph  [] Allergic/Immunologic    Explanation:     MEDICATIONS:    Current Facility-Administered Medications:     melatonin tablet 6 mg, 6 mg, Oral, Nightly, GRISELDA Neville - CNP, 6 mg at 04/11/21 2128    QUEtiapine (SEROQUEL) tablet 400 mg, 400 mg, Oral, Nightly, Maurice Amezquita MD, 400 mg at 04/11/21 2127    acetaminophen (TYLENOL) tablet 650 mg, 650 mg, Oral, Q6H PRN, Rachel London MD    magnesium hydroxide (MILK OF MAGNESIA) 400 MG/5ML suspension 30 mL, 30 mL, Oral, Daily PRN, Rachel London MD    aluminum & magnesium hydroxide-simethicone (MAALOX) 200-200-20 MG/5ML suspension 30 mL, 30 mL, Oral, PRN, Rachel London MD    hydrOXYzine (VISTARIL) capsule 50 mg, 50 mg, Oral, TID PRN, Rachel London MD, 50 mg at 04/12/21 0025    traZODone (DESYREL) tablet 50 mg, 50 mg, Oral, Nightly PRN, Rachel London MD, 50 mg at 04/11/21 2128    nicotine (NICODERM CQ) 21 MG/24HR 1 patch, 1 patch, Transdermal, Daily, Ralph Iqbal MD, 1 patch at 04/11/21 0900    ziprasidone (GEODON) injection 20 mg, 20 mg, Intramuscular, Q6H PRN, 20 mg at 04/10/21 0922 **AND** [COMPLETED] sterile water injection 1.2 mL, 1.2 mL, Intramuscular, Once, Blaine Craze, APRN - CNP, 1.2 mL at 04/10/21 0413    haloperidol lactate (HALDOL) injection 10 mg, 10 mg, Intramuscular, Q6H PRN **OR** haloperidol (HALDOL) tablet 10 mg, 10 mg, Oral, Q6H PRN, Blaine Craze, APRN - CNP, 10 mg at 04/10/21 2206    diphenhydrAMINE (BENADRYL) injection 50 mg, 50 mg, Intramuscular, Q6H PRN **OR** diphenhydrAMINE (BENADRYL) tablet 50 mg, 50 mg, Oral, Q6H PRN, Blaine Craze, APRN - CNP, 50 mg at 04/10/21 2205    LORazepam (ATIVAN) injection 2 mg, 2 mg, Intramuscular, Q6H PRN, James Craze, APRN - CNP, 2 mg at 04/11/21 0046    benztropine (COGENTIN) tablet 1 mg, 1 mg, Oral, BID, James Craze, APRN - CNP, 1 mg at 04/11/21 2128    vitamin D (CHOLECALCIFEROL) tablet 5,000 Units, 5,000 Units, Oral, Daily, Blaine Joseze, APRN - CNP, 5,000 Units at 04/11/21 0858    divalproex (DEPAKOTE) DR tablet 500 mg, 500 mg, Oral, 3 times per day, James Craze, APRN - CNP, 500 mg at 04/11/21 2128    haloperidol (HALDOL) tablet 5 mg, 5 mg, Oral, Daily, James Craze, APRN - CNP, 5 mg at 04/11/21 0857    lisinopril (PRINIVIL;ZESTRIL) tablet 20 mg, 20 mg, Oral, Daily, Blaine Joseze, APRN - CNP, 20 mg at 04/11/21 0857    cetirizine (ZYRTEC) tablet 10 mg, 10 mg, Oral, Daily, James Thibodeaux, APRN - CNP, 10 mg at 04/11/21 0857    fluticasone (FLONASE) 50 MCG/ACT nasal spray 2 spray, 2 spray, Each Nostril, Daily, James Thibodeaux, APRN - CNP, 2 spray at 04/11/21 0859    montelukast (SINGULAIR) tablet 10 mg, 10 mg, Oral, Nightly, James Thibodeaux, APRN - CNP, 10 mg at 04/11/21 2127    pantoprazole (PROTONIX) tablet 40 mg, 40 mg, Oral, QAM AC, James Garciaze, APRN - CNP, 40 mg at 04/12/21 0659    tamsulosin (FLOMAX) capsule 0.4 mg, 0.4 mg, Oral, Daily, James Thibodeaux, APRN - CNP, 0.4 mg at 04/11/21 1649    propranolol (INDERAL) tablet 15 mg, 15 mg, Oral, TID, Dennis Holley, APRN - CNP, 15 mg at 04/11/21 2127    diphenhydrAMINE (BENADRYL) injection 50 mg, 50 mg, Intravenous, ONCE PRN, Maren Faulkner MD      Examination:  /79   Pulse 62   Temp 98.9 °F (37.2 °C)   Resp 16   Ht 6' (1.829 m)   Wt 278 lb (126.1 kg)   SpO2 97%   BMI 37.70 kg/m²   Gait - steady  Medication side effects(SE): Denies    Mental Status Examination:     71-year-old  male in hospital attire resting on his bed, he is superficially cooperative. Psychomotor reveals no agitation, retardation or abnormal posture. Speech is clear coherent he is able to answer questions with relevance there is no delayed or long latency of response. Eye contact is good throughout assessment. Mood is \"ok\" Affect is appropriate pleasant. Thought process is linear and goal-directed there are no looseness of associations or flight of ideas. Thought content avoid any auditory visualizations delusions or other perceptual normalities the patient is devoid of suicidal or homicidal ideation intent or plan. Devoid of auditory or visual hallucinations. Cognitive function appears to be at baseline. Memory is intact through conversation. Insight judgment and impulse control are improving r. Patient is alert and oriented to person place time and situation and easily able to recount events leading to his hospitalization. ASSESSMENT:  Patient symptoms are:  [x] Well controlled  [] Improving  [] Worsening  [] No change      Diagnosis:   Principal Problem:    Schizoaffective disorder, bipolar type ([facility] [address]  Resolved Problems:    * No resolved hospital problems.  *      LABS:    Recent Labs     04/09/21  1533   WBC 11.5   HGB 13.8        Recent Labs     04/09/21  1533      K 4.6      CO2 27   BUN 11   CREATININE 0.6*   GLUCOSE 127*     Recent Labs     04/09/21  1533   BILITOT 0.3   ALKPHOS 79   AST
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
## 2021-04-12 NOTE — PROGRESS NOTES
Attended afternoon meet and greet. Pleasant and engaged in group. Willing to share when prompted. Patient participated in would you rather trivia. Patient 1 of 14 in attendance.

## 2021-04-12 NOTE — PLAN OF CARE
Isolative to his room. Affect is sad and flat. Voicing concerns about his diet and elevated blood sugars. Pt is eating mostly carbs and is on a general diet. Requesting to be put on a sugar free diet. \"I don't know what to eat\" Requesting to speak to a dietician. Denies suicidal thoughts or intent to harm himself or others. No voiced delusions. Denies hallucinations.

## 2021-04-12 NOTE — PROGRESS NOTES
Spoke with nurse from [facility] [address] counseling, pt's last dose was Yamilet 882mg./3.2ml next dose is due 4/14/2021

## 2021-04-12 NOTE — PLAN OF CARE
Problem: Anger Management/Homicidal Ideation:  Goal: Absence of angry outbursts  Description: Absence of angry outbursts  4/12/2021 1005 by Alice Benavides RN  Outcome: Met This Shift  NO OUTBURSTS. 4/12/2021 2456 by Kevin Jose RN  Outcome: Met This Shift     Problem: Altered Mood, Psychotic Behavior:  Goal: Able to verbalize reality based thinking  Description: Able to verbalize reality based thinking  4/12/2021 1005 by Alice Benavides RN  Outcome: Met This Shift  NO VOICED DELUSIONS.  PT. DID REPORT RECENT HX. OF PARANOIA.   4/12/2021 0469 by Kevin Jose RN  Outcome: Ongoing

## 2021-04-12 NOTE — PROGRESS NOTES
Attended morning community meeting. Updated on staffing changes and daily expectations. Patient 1 of 17 in attendance.    Shared goal for the day as to eat a balanced meal.

## 2021-04-12 NOTE — GROUP NOTE
Group Therapy Note    Date: 4/12/2021    Group Start Time: 1000  Group End Time: 4109  Group Topic: Psychoeducation    [facility]    Magnolia Rodriguez, CTRS        Group Therapy Note      Number of participants: 13  Type of group: Psychoeducation  Mode of intervention: Education, Support, Socialization, Exploration, Clarifying, and Problem-solving  Topic: Creating Healthier Boundaries  Objective: Pt will identify 1 way to implement healthier boundaries in recovery. Notes:   Pt was interactive during group identifying 1 way to implement healthier boundaries in recovery. Pt gave support and feedback to others. Status After Intervention:  Improved    Participation Level:  Active Listener and Interactive    Participation Quality: Appropriate, Attentive, Sharing and Supportive      Speech:  normal      Thought Process/Content: Logical      Affective Functioning: Congruent      Mood: euthymic      Level of consciousness:  Alert, Oriented x4 and Attentive      Response to Learning: Able to verbalize current knowledge/experience, Able to verbalize/acknowledge new learning, Able to retain information, Capable of insight, Able to change behavior and Progressing to goal      Endings: None Reported    Modes of Intervention: Education, Support, Socialization, Exploration, Clarifying and Problem-solving

## 2021-04-13 LAB
METER GLUCOSE: 103 MG/DL (ref 74–99)
METER GLUCOSE: 148 MG/DL (ref 74–99)
METER GLUCOSE: 149 MG/DL (ref 74–99)
METER GLUCOSE: 153 MG/DL (ref 74–99)

## 2021-04-13 PROCEDURE — 1240000000 HC EMOTIONAL WELLNESS R&B

## 2021-04-13 PROCEDURE — 99232 SBSQ HOSP IP/OBS MODERATE 35: CPT | Performed by: NURSE PRACTITIONER

## 2021-04-13 PROCEDURE — 82962 GLUCOSE BLOOD TEST: CPT

## 2021-04-13 PROCEDURE — 6370000000 HC RX 637 (ALT 250 FOR IP): Performed by: PSYCHIATRY & NEUROLOGY

## 2021-04-13 PROCEDURE — 6370000000 HC RX 637 (ALT 250 FOR IP): Performed by: NURSE PRACTITIONER

## 2021-04-13 RX ORDER — DIVALPROEX SODIUM 250 MG/1
500 TABLET, DELAYED RELEASE ORAL 3 TIMES DAILY
Status: DISCONTINUED | OUTPATIENT
Start: 2021-04-14 | End: 2021-04-14 | Stop reason: HOSPADM

## 2021-04-13 RX ADMIN — GABAPENTIN 400 MG: 400 CAPSULE ORAL at 21:34

## 2021-04-13 RX ADMIN — METFORMIN HYDROCHLORIDE 500 MG: 500 TABLET ORAL at 08:48

## 2021-04-13 RX ADMIN — METFORMIN HYDROCHLORIDE 500 MG: 500 TABLET ORAL at 16:42

## 2021-04-13 RX ADMIN — PANTOPRAZOLE SODIUM 40 MG: 40 TABLET, DELAYED RELEASE ORAL at 06:44

## 2021-04-13 RX ADMIN — DIVALPROEX SODIUM 500 MG: 250 TABLET, DELAYED RELEASE ORAL at 13:04

## 2021-04-13 RX ADMIN — HYDROXYZINE PAMOATE 50 MG: 25 CAPSULE ORAL at 00:26

## 2021-04-13 RX ADMIN — TRAZODONE HYDROCHLORIDE 50 MG: 50 TABLET ORAL at 21:34

## 2021-04-13 RX ADMIN — MONTELUKAST SODIUM 10 MG: 10 TABLET ORAL at 21:34

## 2021-04-13 RX ADMIN — HALOPERIDOL 5 MG: 5 TABLET ORAL at 08:48

## 2021-04-13 RX ADMIN — LISINOPRIL 20 MG: 20 TABLET ORAL at 08:48

## 2021-04-13 RX ADMIN — Medication 6 MG: at 21:33

## 2021-04-13 RX ADMIN — QUETIAPINE FUMARATE 400 MG: 200 TABLET ORAL at 21:33

## 2021-04-13 RX ADMIN — PROPRANOLOL HYDROCHLORIDE 15 MG: 10 TABLET ORAL at 13:04

## 2021-04-13 RX ADMIN — CETIRIZINE HYDROCHLORIDE 10 MG: 10 TABLET, FILM COATED ORAL at 08:48

## 2021-04-13 RX ADMIN — GABAPENTIN 400 MG: 400 CAPSULE ORAL at 13:05

## 2021-04-13 RX ADMIN — DIVALPROEX SODIUM 500 MG: 250 TABLET, DELAYED RELEASE ORAL at 06:44

## 2021-04-13 RX ADMIN — DIVALPROEX SODIUM 500 MG: 250 TABLET, DELAYED RELEASE ORAL at 21:34

## 2021-04-13 RX ADMIN — Medication 5000 UNITS: at 08:48

## 2021-04-13 RX ADMIN — BENZTROPINE MESYLATE 1 MG: 1 TABLET ORAL at 08:47

## 2021-04-13 RX ADMIN — FLUTICASONE PROPIONATE 2 SPRAY: 50 SPRAY, METERED NASAL at 08:53

## 2021-04-13 RX ADMIN — GABAPENTIN 400 MG: 400 CAPSULE ORAL at 16:42

## 2021-04-13 RX ADMIN — PROPRANOLOL HYDROCHLORIDE 15 MG: 10 TABLET ORAL at 21:34

## 2021-04-13 RX ADMIN — PROPRANOLOL HYDROCHLORIDE 15 MG: 10 TABLET ORAL at 08:48

## 2021-04-13 RX ADMIN — BENZTROPINE MESYLATE 1 MG: 1 TABLET ORAL at 21:34

## 2021-04-13 RX ADMIN — TAMSULOSIN HYDROCHLORIDE 0.4 MG: 0.4 CAPSULE ORAL at 16:42

## 2021-04-13 RX ADMIN — GABAPENTIN 400 MG: 400 CAPSULE ORAL at 08:48

## 2021-04-13 ASSESSMENT — PAIN SCALES - GENERAL
PAINLEVEL_OUTOF10: 0
PAINLEVEL_OUTOF10: 0

## 2021-04-13 NOTE — GROUP NOTE
Group Therapy Note    Date: 4/13/2021    Group Start Time: 1000  Group End Time: 1030  Group Topic: Psychoeducation    [facility]    Magnolia Rodriguez, CTRS        Group Therapy Note    Number of participants: 12  Type of group: Psychoeducation  Mode of intervention: Education, Support, Socialization, Exploration, Clarifying, and Problem-solving  Topic: Time Management Skills   Objective: Pt will identify 1 way to improve time management skills in recovery. Patient's Goal:  \"Get my shot and follow up with discharge plan\"     Notes:   Pt was interactive during group sharing 1 way to improve time management skills in recovery. Pt gave support and feedback to others. Status After Intervention:  Improved    Participation Level:  Active Listener and Interactive    Participation Quality: Appropriate, Attentive, Sharing and Supportive      Speech:  normal      Thought Process/Content: Logical      Affective Functioning: Congruent      Mood: euthymic      Level of consciousness:  Alert, Oriented x4 and Attentive      Response to Learning: Able to verbalize current knowledge/experience, Able to verbalize/acknowledge new learning, Able to retain information, Capable of insight, Able to change behavior and Progressing to goal      Endings: None Reported    Modes of Intervention: Education, Support, Socialization, Exploration, Clarifying and Problem-solving

## 2021-04-13 NOTE — PROGRESS NOTES
UP ON UNIT, PLEASANT, LESS ANXIOUS. DENIES SUICIDAL IDEATION, HOMICIDAL IDEATIONS AND HALLUCINATIONS. PT. REPORTS FEELS LESS PARANOID AND IS READY FOR DISCHARGE TO GROUP HOME. PT. REMAINS MEDICATION COMPLIANT AND ATTENDS SELECT GROUPS.

## 2021-04-13 NOTE — GROUP NOTE
This note will not be viewable in [facility] for the following reason(s). This is a Psychotherapy Note. Group Therapy Note    Date: 4/13/2021    Group Start Time: 1130  Group End Time: 1200  Group Topic: Psychotherapy    [facility]    GERSON Golden LSW        Group Therapy Note    Attendees: 16         Patient's Goal:   Understanding trauma and working through it. Notes:  Patient was an active listener in group    Status After Intervention:  Improved    Participation Level:  Active Listener and Interactive    Participation Quality: Appropriate, Attentive and Sharing      Speech:  normal      Thought Process/Content: Logical      Affective Functioning: Congruent      Mood: euthymic      Level of consciousness:  Alert      Response to Learning: Progressing to goal      Endings: None Reported    Modes of Intervention: Education      Discipline Responsible: /Counselor      Signature:  GERSON Golden LSW

## 2021-04-13 NOTE — PLAN OF CARE
Problem: Anger Management/Homicidal Ideation:  Goal: Absence of angry outbursts  Description: Absence of angry outbursts  Outcome: Met This Shift  NO OUTBURSTS. Problem: Altered Mood, Psychotic Behavior:  Goal: Able to verbalize reality based thinking  Description: Able to verbalize reality based thinking  Outcome: Met This Shift  NO VOICED DELUSIONS, BUT REPORTS FEELING PARANOID AT TIMES.

## 2021-04-13 NOTE — PROGRESS NOTES
BEHAVIORAL HEALTH FOLLOW-UP NOTE     4/13/2021       Chief Complaint: \"I am feeling so much better\"    Interim History: Patient up on the unit. He is attending groups and socializing with peers. He like to have his Aristada injection now rather than tomorrow and is hopeful for discharge tomorrow he states he is feeling much better now is back on medications. His affect is brighter more pleasant no behavioral disturbances noted.   Denies SI/HI intent or plan denies auditory visual hallucinations    Appetite:   [x] Normal/Unchanged  [] Increased  [] Decreased      Sleep:       [x] Normal/Unchanged  [] Fair       [] Poor              Energy:    [x] Normal/Unchanged  [] Increased  [] Decreased        SI [] Present  [x] Absent    HI  []Present  [x] Absent     Aggression:  [x] yes  [] no    Patient is [x] able  [] unable to CONTRACT FOR SAFETY     PAST MEDICAL/PSYCHIATRIC HISTORY:   Past Medical History:   Diagnosis Date    Anxiety     Arthritis     Hyperlipidemia     Hypertension     Impulse control disease     Schizo affective schizophrenia ([facility] [address]        FAMILY/SOCIAL HISTORY:  Family History   Problem Relation Age of Onset    No Known Problems Mother     No Known Problems Sister      Social History     Socioeconomic History    Marital status: Single     Spouse name: Not on file    Number of children: Not on file    Years of education: Not on file    Highest education level: Not on file   Occupational History     Employer: NOT EMPLOYED   Social Needs    Financial resource strain: Not on file    Food insecurity     Worry: Not on file     Inability: Not on file    Transportation needs     Medical: Not on file     Non-medical: Not on file   Tobacco Use    Smoking status: Current Every Day Smoker     Packs/day: 1.50     Years: 9.00     Pack years: 13.50     Types: Cigarettes     Start date: 2/18/2000    Smokeless tobacco: Never Used   Substance and Sexual Activity    Alcohol use: Not Currently    Drug use: Not Currently    Sexual activity: Not Currently   Lifestyle    Physical activity     Days per week: Not on file     Minutes per session: Not on file    Stress: Not on file   Relationships    Social connections     Talks on phone: Not on file     Gets together: Not on file     Attends Anglican service: Not on file     Active member of club or organization: Not on file     Attends meetings of clubs or organizations: Not on file     Relationship status: Not on file    Intimate partner violence     Fear of current or ex partner: Not on file     Emotionally abused: Not on file     Physically abused: Not on file     Forced sexual activity: Not on file   Other Topics Concern    Not on file   Social History Narrative    Not on file           ROS:  [x] All negative/unchanged except if checked.  Explain positive(checked items) below:  [] Constitutional  [] Eyes  [] Ear/Nose/Mouth/Throat  [] Respiratory  [] CV  [] GI  []   [] Musculoskeletal  [] Skin/Breast  [] Neurological  [] Endocrine  [] Heme/Lymph  [] Allergic/Immunologic    Explanation:     MEDICATIONS:    Current Facility-Administered Medications:     [START ON 4/14/2021] ARIPiprazole lauroxil (ARISTADA) injection 882 mg, 882 mg, Intramuscular, Q28 Days, GRISELDA Tapia CNP    metFORMIN (GLUCOPHAGE) tablet 500 mg, 500 mg, Oral, BID WC, GRISELDA Corrales CNP, 500 mg at 04/13/21 0848    gabapentin (NEURONTIN) capsule 400 mg, 400 mg, Oral, 4x Daily, GRISELDA Corrales CNP, 400 mg at 04/13/21 0848    melatonin tablet 6 mg, 6 mg, Oral, Nightly, GRISELDA Corrales CNP, 6 mg at 04/12/21 2049    QUEtiapine (SEROQUEL) tablet 400 mg, 400 mg, Oral, Nightly, Maren Faulkner MD, 400 mg at 04/12/21 2048    acetaminophen (TYLENOL) tablet 650 mg, 650 mg, Oral, Q6H PRN, Mendez Cortez MD    magnesium hydroxide (MILK OF MAGNESIA) 400 MG/5ML suspension 30 mL, 30 mL, Oral, Daily PRN, Mendez Cortez MD    aluminum & magnesium hydroxide-simethicone (MAALOX) 026-026-80 MG/5ML suspension 30 mL, 30 mL, Oral, PRN, Rachel London MD    hydrOXYzine (VISTARIL) capsule 50 mg, 50 mg, Oral, TID PRN, Rachel London MD, 50 mg at 04/13/21 0026    traZODone (DESYREL) tablet 50 mg, 50 mg, Oral, Nightly PRN, Rachel Lodnon MD, 50 mg at 04/12/21 2049    nicotine (NICODERM CQ) 21 MG/24HR 1 patch, 1 patch, Transdermal, Daily, Rachel London MD, 1 patch at 04/13/21 0848    ziprasidone (GEODON) injection 20 mg, 20 mg, Intramuscular, Q6H PRN, 20 mg at 04/10/21 0922 **AND** [COMPLETED] sterile water injection 1.2 mL, 1.2 mL, Intramuscular, Once, Low Weiss, APRN - CNP, 1.2 mL at 04/10/21 8674    haloperidol lactate (HALDOL) injection 10 mg, 10 mg, Intramuscular, Q6H PRN **OR** haloperidol (HALDOL) tablet 10 mg, 10 mg, Oral, Q6H PRN, Maryagnes Isela, APRN - CNP, 10 mg at 04/10/21 2206    diphenhydrAMINE (BENADRYL) injection 50 mg, 50 mg, Intramuscular, Q6H PRN **OR** diphenhydrAMINE (BENADRYL) tablet 50 mg, 50 mg, Oral, Q6H PRN, Sorayaagnes Isela, APRN - CNP, 50 mg at 04/10/21 2205    LORazepam (ATIVAN) injection 2 mg, 2 mg, Intramuscular, Q6H PRN, Gradyes Isela, APRN - CNP, 2 mg at 04/11/21 0046    benztropine (COGENTIN) tablet 1 mg, 1 mg, Oral, BID, Maryagnes Isela, APRN - CNP, 1 mg at 04/13/21 0847    vitamin D (CHOLECALCIFEROL) tablet 5,000 Units, 5,000 Units, Oral, Daily, Low Weiss, APRN - CNP, 5,000 Units at 04/13/21 0848    divalproex (DEPAKOTE) DR tablet 500 mg, 500 mg, Oral, 3 times per day, GRISELDA Neville CNP, 500 mg at 04/13/21 0644    haloperidol (HALDOL) tablet 5 mg, 5 mg, Oral, Daily, GRISELDA Neville - CNP, 5 mg at 04/13/21 0848    lisinopril (PRINIVIL;ZESTRIL) tablet 20 mg, 20 mg, Oral, Daily, GRISELDA Neville - CNP, 20 mg at 04/13/21 0848    cetirizine (ZYRTEC) tablet 10 mg, 10 mg, Oral, Daily, GRISELDA Neville - CNP, 10 mg at 04/13/21 0848    fluticasone (FLONASE) 50 MCG/ACT nasal spray 2 change      Diagnosis:   Principal Problem:    Schizoaffective disorder, bipolar type ([facility] [address]  Resolved Problems:    * No resolved hospital problems. *      LABS:    No results for input(s): WBC, HGB, PLT in the last 72 hours. No results for input(s): NA, K, CL, CO2, BUN, CREATININE, GLUCOSE in the last 72 hours. No results for input(s): BILITOT, ALKPHOS, AST, ALT in the last 72 hours. Lab Results   Component Value Date    LABAMPH NOT DETECTED 04/09/2021    LABAMPH NOT DETECTED 06/19/2011    BARBSCNU NOT DETECTED 04/09/2021    LABBENZ NOT DETECTED 04/09/2021    LABBENZ POSITIVE 06/19/2011    CANNAB NOT DETECTED  06/19/2011    LABMETH NOT DETECTED 04/09/2021    OPIATESCREENURINE NOT DETECTED 04/09/2021    PHENCYCLIDINESCREENURINE NOT DETECTED 04/09/2021    ETOH <10 04/09/2021     Lab Results   Component Value Date    TSH 1.190 08/07/2019     Lab Results   Component Value Date    LITHIUM 0.99 09/15/2011     Lab Results   Component Value Date    VALPROATE 81 08/07/2019       RISK ASSESSMENT:     Treatment Plan:  Reviewed current Medications with the patient. Risks, benefits, side effects, drug-to-drug interactions and alternatives to treatment were discussed. Collateral information:   CD evaluation  Encourage patient to attend group and other milieu activities.   Discharge planning discussed with the patient and treatment team.    Continue Depakote 500 mg twice daily  Continue Seroquel to 400 mg nightly which patient says is what he takes at home  Per [facility] counseling next Aristada injection 882 mg IM every 30 days is due on 4/14/2021    PSYCHOTHERAPY/COUNSELING:  [x] Therapeutic interview  [x] Supportive  [] CBT  [] Ongoing  [] Other    [x] Patient continues to need, on a daily basis, active treatment furnished directly by or requiring the supervision of inpatient psychiatric personnel      Anticipated Length of stay: 3-7 days            Electronically signed by Alethea Galeazzi, APRN - CNP on 9/35/3204 at 9:37 AM

## 2021-04-13 NOTE — PLAN OF CARE
Out for dinner. States he feels\"much better and is ready to go home\". Denies suicidal thoughts or intent to harm self or others. No voiced delusions. Denies hallucinations. Affect is blunted, mood is flat and sad.

## 2021-04-14 VITALS
OXYGEN SATURATION: 97 % | RESPIRATION RATE: 16 BRPM | HEART RATE: 79 BPM | SYSTOLIC BLOOD PRESSURE: 130 MMHG | TEMPERATURE: 97.8 F | HEIGHT: 72 IN | DIASTOLIC BLOOD PRESSURE: 74 MMHG | WEIGHT: 278 LBS | BODY MASS INDEX: 37.65 KG/M2

## 2021-04-14 LAB
METER GLUCOSE: 113 MG/DL (ref 74–99)
VALPROIC ACID LEVEL: 51 MCG/ML (ref 50–100)

## 2021-04-14 PROCEDURE — 99239 HOSP IP/OBS DSCHRG MGMT >30: CPT | Performed by: NURSE PRACTITIONER

## 2021-04-14 PROCEDURE — 6370000000 HC RX 637 (ALT 250 FOR IP): Performed by: NURSE PRACTITIONER

## 2021-04-14 PROCEDURE — 6370000000 HC RX 637 (ALT 250 FOR IP): Performed by: PSYCHIATRY & NEUROLOGY

## 2021-04-14 PROCEDURE — 36415 COLL VENOUS BLD VENIPUNCTURE: CPT

## 2021-04-14 PROCEDURE — 82962 GLUCOSE BLOOD TEST: CPT

## 2021-04-14 PROCEDURE — 80164 ASSAY DIPROPYLACETIC ACD TOT: CPT

## 2021-04-14 RX ORDER — QUETIAPINE FUMARATE 400 MG/1
400 TABLET, FILM COATED ORAL NIGHTLY
Qty: 30 TABLET | Refills: 0 | Status: SHIPPED | OUTPATIENT
Start: 2021-04-14 | End: 2021-05-14

## 2021-04-14 RX ORDER — LANOLIN ALCOHOL/MO/W.PET/CERES
6 CREAM (GRAM) TOPICAL NIGHTLY
Qty: 60 TABLET | Refills: 0 | Status: SHIPPED | OUTPATIENT
Start: 2021-04-14 | End: 2021-05-14

## 2021-04-14 RX ORDER — DIVALPROEX SODIUM 500 MG/1
500 TABLET, DELAYED RELEASE ORAL 3 TIMES DAILY
Qty: 90 TABLET | Refills: 0 | Status: SHIPPED | OUTPATIENT
Start: 2021-04-14 | End: 2021-05-14

## 2021-04-14 RX ADMIN — METFORMIN HYDROCHLORIDE 500 MG: 500 TABLET ORAL at 08:49

## 2021-04-14 RX ADMIN — CETIRIZINE HYDROCHLORIDE 10 MG: 10 TABLET, FILM COATED ORAL at 08:49

## 2021-04-14 RX ADMIN — DIVALPROEX SODIUM 500 MG: 250 TABLET, DELAYED RELEASE ORAL at 08:49

## 2021-04-14 RX ADMIN — HALOPERIDOL 5 MG: 5 TABLET ORAL at 09:00

## 2021-04-14 RX ADMIN — BENZTROPINE MESYLATE 1 MG: 1 TABLET ORAL at 08:49

## 2021-04-14 RX ADMIN — LISINOPRIL 20 MG: 20 TABLET ORAL at 08:49

## 2021-04-14 RX ADMIN — GABAPENTIN 400 MG: 400 CAPSULE ORAL at 08:49

## 2021-04-14 RX ADMIN — Medication 5000 UNITS: at 08:50

## 2021-04-14 RX ADMIN — FLUTICASONE PROPIONATE 2 SPRAY: 50 SPRAY, METERED NASAL at 09:19

## 2021-04-14 RX ADMIN — PROPRANOLOL HYDROCHLORIDE 15 MG: 10 TABLET ORAL at 08:49

## 2021-04-14 RX ADMIN — HYDROXYZINE PAMOATE 50 MG: 25 CAPSULE ORAL at 04:02

## 2021-04-14 RX ADMIN — PANTOPRAZOLE SODIUM 40 MG: 40 TABLET, DELAYED RELEASE ORAL at 06:23

## 2021-04-14 NOTE — PROGRESS NOTES
CLINICAL PHARMACY NOTE: MEDS TO [address] Select Patient?: No  Total # of Prescriptions Filled: 2   The following medications were delivered to the patient:  · Quetiapine 400mg  · Divalproex 500mg  Total # of Interventions Completed: 3  Time Spent (min): 30    Additional Documentation:  Delivered to RN

## 2021-04-14 NOTE — CARE COORDINATION
KIARA spoke with pt's legal guardian Linda Corral ([phone]), notified him that pt is being discharged today. Jessi Cally confirmed that the plan is for pt to return to the [facility], where he lives alone in an apartment and has occasional assistance. KIARA spoke with Dionisio Salomon ([phone]) from the [facility], she confirmed that pt can return today, she will need the discharge instructions and medication changes faxed to her. She also reported that it would be best if pt's prescriptions were filled at [address] prior to discharge, RN aware. Pt has follow up appointments with [facility] in ' [facility], including an appointment for his injection in 4 weeks. Pt's legal guardian aware of appointment information- stated that he will arrange transportation. KIARA called pt's [facility] insurance, scheduled transportation for pt to get home, they will call the nurses station when they arrive, between 11:37am-2:37pm. RN aware. KIARA notified Dionisio Salomon from [facility] of  time and faxed discharge instructions.      Fortunato Schwab, MSW, ANTHONYW

## 2021-04-14 NOTE — DISCHARGE SUMMARY
DISCHARGE SUMMARY      Patient ID:  Ronnald Closs  [id number]  53 y.o.  1970    Admit date: 4/10/2021    Discharge date and time: 4/14/2021    Admitting Physician: Ralph Iqbal MD     Discharge Physician: Dr Hilario Serrano MD    Discharge Diagnoses:   Patient Active Problem List   Diagnosis    Depression    Psychosis [facility])    Essential hypertension    Gastroesophageal reflux disease    Benign prostatic hyperplasia    Schizoaffective disorder, bipolar type ([facility]       Admission Condition: poor    Discharged Condition: stable    Admission Circumstance:Pt presents pinkslipped by Crisis Intervention Worker [facility] pinkslip \"Client has not been taking his oral medications. Yomi Penaloza has been expressing delusional thinking where someone is breaking into his apartment, stealing his food a [employer]" client refusing to get his medication stating\"whores are wondering the halls\"  Client in need of assesment, concerns that he could harm himself/other if this escalates.       PAST MEDICAL/PSYCHIATRIC HISTORY:   Past Medical History:   Diagnosis Date    Anxiety     Arthritis     Hyperlipidemia     Hypertension     Impulse control disease     Schizo affective schizophrenia ([facility]        FAMILY/SOCIAL HISTORY:  Family History   Problem Relation Age of Onset    No Known Problems Mother     No Known Problems Sister      Social History     Socioeconomic History    Marital status: Single     Spouse name: Not on file    Number of children: Not on file    Years of education: Not on file    Highest education level: Not on file   Occupational History     Employer: NOT EMPLOYED   Social Needs    Financial resource strain: Not on file    Food insecurity     Worry: Not on file     Inability: Not on file    Transportation needs     Medical: Not on file     Non-medical: Not on file   Tobacco Use    Smoking status: Current Every Day Smoker     Packs/day: 1.50     Years: 9.00     Pack years: 13.50     Types: Cigarettes     Start date: 2/18/2000    Smokeless tobacco: Never Used   Substance and Sexual Activity    Alcohol use: Not Currently    Drug use: Not Currently    Sexual activity: Not Currently   Lifestyle    Physical activity     Days per week: Not on file     Minutes per session: Not on file    Stress: Not on file   Relationships    Social connections     Talks on phone: Not on file     Gets together: Not on file     Attends Confucianism service: Not on file     Active member of club or organization: Not on file     Attends meetings of clubs or organizations: Not on file     Relationship status: Not on file    Intimate partner violence     Fear of current or ex partner: Not on file     Emotionally abused: Not on file     Physically abused: Not on file     Forced sexual activity: Not on file   Other Topics Concern    Not on file   Social History Narrative    Not on file       MEDICATIONS:    Current Facility-Administered Medications:     ARIPiprazole lauroxil (ARISTADA) injection 882 mg, 882 mg, Intramuscular, Q28 Days, GRISELDA Tapia - CNP    divalproex (DEPAKOTE) DR tablet 500 mg, 500 mg, Oral, TID, Linus Rosado MD, 500 mg at 04/14/21 0849    metFORMIN (GLUCOPHAGE) tablet 500 mg, 500 mg, Oral, BID WC, Elnoria Kehr, APRN - CNP, 500 mg at 04/14/21 0849    gabapentin (NEURONTIN) capsule 400 mg, 400 mg, Oral, 4x Daily, Elnoria Kehr, APRN - CNP, 400 mg at 04/14/21 0849    melatonin tablet 6 mg, 6 mg, Oral, Nightly, Elnoria Kehr, APRN - CNP, 6 mg at 04/13/21 2133    QUEtiapine (SEROQUEL) tablet 400 mg, 400 mg, Oral, Nightly, Alvino Nj MD, 400 mg at 04/13/21 2133    acetaminophen (TYLENOL) tablet 650 mg, 650 mg, Oral, Q6H PRN, Linus Rosado MD    magnesium hydroxide (MILK OF MAGNESIA) 400 MG/5ML suspension 30 mL, 30 mL, Oral, Daily PRN, Linus Rosado MD    aluminum & magnesium hydroxide-simethicone (MAALOX) 911-662-81 MG/5ML suspension 30 mL, 30 mL, Oral, PRN, Roseann Johnson sleeping well there are no neurovegetative signs or symptoms of depression he denied any auditory or visual hallucinations. There are no overt or covert signs of psychosis. He was appreciative of the help that he received here. This patient no longer meets criteria for inpatient hospitalization. No AVH or paranoid thoughts  No hopeless or worthless feeling  No active SI/HI  Appetite:  [x] Normal  [] Increased  [] Decreased    Sleep:       [x] Normal  [] Fair       [] Poor            Energy:    [x] Normal  [] Increased  [] Decreased     SI [] Present  [x] Absent  HI  []Present  [x] Absent   Aggression:  [] yes  [x] no  Patient is [x] able  [] unable to CONTRACT FOR SAFETY   Medication side effects(SE):  [x] None(Psych. Meds.) [] Other      Mental Status Examination on discharge:    Level of consciousness:  within normal limits   Appearance:  well-appearing  Behavior/Motor:  no abnormalities noted  Attitude toward examiner:  attentive and good eye contact  Speech:  spontaneous, normal rate and normal volume   Mood: \" My mood is good. \"  Affect: Bright appropriate and pleasant  Thought processes: Linear without flight of ideas loose associations  Thought content: Devoid of any auditory visualizations delusions or perceptual rise.   Denies SI/HI intent or plan  Cognition:  oriented to person, place, and time   Concentration intact  Memory intact  Insight good   Judgement fair   Fund of Knowledge adequate      ASSESSMENT:  Patient symptoms are:  [x] Well controlled  [x] Improving  [] Worsening  [] No change    Reason for more than one antipsychotic:  [] N/A  [x] 3 Failed Monotherapy attempts (Abilify, Seroquel, Invega )  [] Crossover to a new antipsychotic  [] Taper to Monotherapy from Polypharmacy  [] Augmentation of clozapine therapy due to treatment resistance to single therapy    Diagnosis:  Principal Problem:    Schizoaffective disorder, bipolar type ([facility]  Resolved Problems:    * No resolved hospital problems. *      LABS:    No results for input(s): WBC, HGB, PLT in the last 72 hours. No results for input(s): NA, K, CL, CO2, BUN, CREATININE, GLUCOSE in the last 72 hours. No results for input(s): BILITOT, ALKPHOS, AST, ALT in the last 72 hours. Lab Results   Component Value Date    LABAMPH NOT DETECTED 04/09/2021    LABAMPH NOT DETECTED 06/19/2011    BARBSCNU NOT DETECTED 04/09/2021    LABBENZ NOT DETECTED 04/09/2021    LABBENZ POSITIVE 06/19/2011    CANNAB NOT DETECTED  06/19/2011    LABMETH NOT DETECTED 04/09/2021    OPIATESCREENURINE NOT DETECTED 04/09/2021    PHENCYCLIDINESCREENURINE NOT DETECTED 04/09/2021    ETOH <10 04/09/2021     Lab Results   Component Value Date    TSH 1.190 08/07/2019     Lab Results   Component Value Date    LITHIUM 0.99 09/15/2011     Lab Results   Component Value Date    VALPROATE 81 08/07/2019       RISK ASSESSMENT AT DISCHARGE: Low risk for suicide and homicide. Treatment Plan:  Reviewed current Medications with the patient. Education provided on the complaince with treatment. Risks, benefits, side effects, drug-to-drug interactions and alternatives to treatment were discussed. Encourage patient to attend outpatient follow up appointment and therapy. Patient was advised to call the outpatient provider, visit the nearest ED or call 911 if symptoms are not manageable. Patient's family member was contacted prior to the discharge. Medication List      ASK your doctor about these medications    Aristada 882 MG/3.2ML Prsy injection  Generic drug: ARIPiprazole lauroxil  Ask about: Which instructions should I use? atorvastatin 20 MG tablet  Commonly known as: LIPITOR     benztropine 1 MG tablet  Commonly known as: COGENTIN     divalproex 500 MG DR tablet  Commonly known as: DEPAKOTE     gabapentin 400 MG capsule  Commonly known as: NEURONTIN  Take 1 capsule by mouth 4 times daily for 90 days.      lisinopril 20 MG tablet  Commonly known as: PRINIVIL;ZESTRIL Take 1 tablet by mouth daily     loratadine 10 MG tablet  Commonly known as: CLARITIN  Take 1 tablet by mouth daily     metFORMIN 500 MG tablet  Commonly known as: GLUCOPHAGE     mirtazapine 15 MG tablet  Commonly known as: REMERON     montelukast 10 MG tablet  Commonly known as: SINGULAIR  Take 1 tablet by mouth nightly     nicotine 14 MG/24HR  Commonly known as: NICODERM CQ     omeprazole 40 MG delayed release capsule  Commonly known as: PRILOSEC  Take 1 capsule by mouth 2 times daily     propranolol 10 MG tablet  Commonly known as: INDERAL  Take one and one half tablet by mouth three times daily.      QUEtiapine 400 MG tablet  Commonly known as: SEROQUEL     sertraline 25 MG tablet  Commonly known as: ZOLOFT  Take 1 tablet by mouth daily     tamsulosin 0.4 MG capsule  Commonly known as: FLOMAX  Take 1 capsule by mouth daily     Ventolin  (90 Base) MCG/ACT inhaler  Generic drug: albuterol sulfate HFA     Vitamin D3 125 MCG (5000 UT) Tabs  Take 1 tablet by mouth daily              TIME SPEND - 35 MINUTES TO COMPLETE THE EVALUATION, DISCHARGE SUMMARY, MEDICATION RECONCILIATION AND FOLLOW UP CARE     Signed:  Haylee Johnson  0/52/4030  3:06 AM

## 2021-04-14 NOTE — PROGRESS NOTES
[facility]  Discharge Note    Pt discharged with followings belongings:   Dentures: None  Vision - Corrective Lenses: Glasses  Hearing Aid: None  Jewelry: None  Body Piercings Removed: No  Clothing: None  Were All Patient Medications Collected?: Not Applicable   Valuables sent home with pt. Valuables retrieved from locker and returned to patient. Patient education on aftercare instructions:  Given, along with filled prescriptions and suicide crisis management plan. Information faxed to  [facility] [place] by  . Patient verbalize understanding of AVS:   Yes with stated potential to comply to same. .    Status EXAM upon discharge:  Status and Exam  Facial Expression: appropriate  Affect: blunted  Level of Consciousness: Alert  Mood: pleasant  Motor Activity:Normal: yes  Interview Behavior: Cooperative  Preception: [place] to Person, Farrel Nathaniel to Time, [place] to Place, [place] to Situation  Attention:Normal: yes  Thought Processes: more organized  Thought Content: Preoccupations  Hallucinations: None  Delusions: No  Delusions:  Other(See Comment)(NONE VOICED ON A.M. ASSESSMENT)  Memory:Normal: Yes  Insight and Judgment: improved  Present Suicidal Ideation: No  Present Homicidal Ideation: No      Metabolic Screening:    Lab Results   Component Value Date    LABA1C 6.9 (H) 04/11/2021       Lab Results   Component Value Date    CHOL 139 04/11/2021    CHOL 197 08/07/2019    CHOL 158 06/21/2011     Lab Results   Component Value Date    TRIG 217 (H) 04/11/2021    TRIG 122 08/07/2019    TRIG 209 (H) 06/21/2011     Lab Results   Component Value Date    HDL 30 04/11/2021    HDL 48 08/07/2019    HDL 32.0 (A) 06/21/2011     No components found for: [facility]  Lab Results   Component Value Date    LABVLDL 43 04/11/2021    LABVLDL 24 08/07/2019       Musa Malone RN

## 2021-04-14 NOTE — CARE COORDINATION
In order to ensure appropriate transition and discharge planning is in place, the following documents have been transmitted to [facility], as the new outpatient provider:     The d/c diagnosis was transmitted to the next care provider   The reason for hospitalization was transmitted to the next care provider   The d/c medications (dosage and indication) were transmitted to the next care provider    The continuing care plan was transmitted to the next care provider

## 2021-04-14 NOTE — SUICIDE SAFETY PLAN
SAFETY PLAN    A suicide Safety Plan is a document that supports someone when they are having thoughts of suicide. Warning Signs that indicate a suicidal crisis may be developing: What (situations, thoughts, feelings, body sensations, behaviors, etc.) do you experience that lets you know you are beginning to think about suicide? 1. RAGE  2. PARANOIA  3. IRRITABLE, ANXIOUS    Internal Coping Strategies:  What things can I do (relaxation techniques, hobbies, physical activities, etc.) to take my mind off my problems without contacting another person? 1. WALKING  2. SNACK  3. MUSIC    People and social settings that provide distraction: Who can I call or where can I go to distract me? 1. Name: Carlos Manuel Barnett  Phone: [phone]  2. Name: TALK TO STAFF    3. Place:[place]          4. Place: OUTSIDE    People whom I can ask for help: Who can I call when I need help - for example, friends, family, clergy, someone else? 1. Name: STAFF                2. Name: Sofiya Frost or Behavioral Health agencies I can contact during a crisis: Who can I call for help - for example, my doctor, my psychiatrist, my psychologist, a mental health provider, a suicide hotline? 1. Clinician Name: Sailaja Cutler  Phone: [phone]      Clinician Pager or Emergency Contact #: [phone]          8. Suicide Prevention Lifeline: 9-405-500-TALK (1300)    3. [address] [place] [address] Emergency Services -  for example, [facility] suicide hotline, [facility]: [phone]      Emergency Services Address: [address] [place] [address]      Emergency Services Phone:  [phone]    Making the environment safe: How can I make my environment (house/apartment/living space) safer? For example, can I remove guns, medications, and other items?   1. NO ALCOHOL  2.  NO STREET DRUGS

## 2021-04-14 NOTE — PROGRESS NOTES
Out in day room with peers watching TV \"my mind is clearer\" tells me he is going back [facility] tomorrow thankful all the help he got here calmer relaxed denies any SI HI or humphries emotional support given

## 2021-09-16 NOTE — PLAN OF CARE
CC:  Jimmy Meredith is here today for Follow-up (htn)    Medications: medications verified, no change  Added preferred pharmacy  Refills needed today?  NO  denies Latex allergy or sensitivity    Patient would like communication of their results via:  Send letter with results.  If need to speak with pt, call   Home Phone: [phone] (home)  Okay to leave a message containing results? Yes  Health Maintenance Due   Topic Date Due   • Medicare Wellness Visit  Never done   • Shingles Vaccine (2 of 2) 05/05/2021   • Influenza Vaccine (1) 09/01/2021     Patient is due for the topics as listed above and wishes to proceed with them.           COVID-19 Screening:    • Does the patient OR patient’s household members have any of the following symptoms?  o Temperature: Fever ?100.0°F or ?37.8°C?  No  o Respiratory symptoms: New or worsening cough, shortness of breath, difficulty breathing, or sore throat? No  o GI symptoms: New onset of nausea, vomiting or diarrhea?  No  o Miscellaneous: New onset of loss of taste or smell, chills, repeated shaking with chills, muscle pain, headache, congestion or runny nose?  No  • Has the patient or a household member tested positive for COVID-19 in the last 14 days?  No  • Has the patient or a household member been tested for COVID-19 and are waiting for the results?  No             [facility]  Initial Interdisciplinary Treatment Plan NOTE    Review Date & Time: 04/10/2021 1030hrs    Patient was not in treatment team    Admission Type:   Admission Type: Involuntary    Reason for admission:  Reason for Admission: \"I dont want to talk about it. Its a family issue\"      Estimated Length of Stay Update:  04/14/2021  Estimated Discharge Date Update: 04/14/2021    PATIENT STRENGTHS:  Patient Strengths Strengths: No significant Physical Illness, Connection to output provider  Patient Strengths and Limitations:Limitations: Apathetic / unmotivated, Tendency to isolate self, Unrealistic self-view, General negative or hopeless attitude about future/recovery  Addictive Behavior:Addictive Behavior  In the past 3 months, have you felt or has someone told you that you have a problem with:  : None  Do you have a history of Chemical Use?: No  Do you have a history of Alcohol Use?: No  Do you have a history of Street Drug Abuse?: No  Histroy of Prescripton Drug Abuse?: No  Medical Problems:  Past Medical History:   Diagnosis Date    Anxiety     Arthritis     Hyperlipidemia     Hypertension     Impulse control disease     Schizo affective schizophrenia ([facility]        EDUCATION:   Learner Progress Toward Treatment Goals: Reviewed results and recommendations of this team, Reviewed group plan and strategies, Reviewed signs, symptoms and risk of self harm and violent behavior and Reviewed goals and plan of care    Method: Individual    Outcome: Verbalized understanding and Demonstrated Understanding    PATIENT GOALS: no goal    PLAN/TREATMENT RECOMMENDATIONS UPDATE: Offer and encourage groups and provide emotional support.     GOALS UPDATE:   Time frame for Short-Term Goals: one week    Nora Cho RN

## 2023-03-22 ENCOUNTER — HOSPITAL ENCOUNTER (INPATIENT)
Age: 53
LOS: 6 days | Discharge: HOME | DRG: 750 | End: 2023-03-28
Attending: EMERGENCY MEDICINE | Admitting: PSYCHIATRY & NEUROLOGY
Payer: MEDICAID

## 2023-03-22 DIAGNOSIS — F31.64 BIPOLAR DISORDER, CURRENT EPISODE MIXED, SEVERE, WITH PSYCHOTIC FEATURES (HCC): Primary | ICD-10-CM

## 2023-03-22 PROBLEM — F25.9 SCHIZO AFFECTIVE SCHIZOPHRENIA (HCC): Status: ACTIVE | Noted: 2023-03-22

## 2023-03-22 LAB
ALBUMIN SERPL-MCNC: 4.4 G/DL (ref 3.5–5.2)
ALP SERPL-CCNC: 85 U/L (ref 40–129)
ALT SERPL-CCNC: 32 U/L (ref 0–40)
AMPHET UR QL SCN: NOT DETECTED
ANION GAP SERPL CALCULATED.3IONS-SCNC: 12 MMOL/L (ref 7–16)
APAP SERPL-MCNC: <5 MCG/ML (ref 10–30)
AST SERPL-CCNC: 16 U/L (ref 0–39)
B PARAP IS1001 DNA NPH QL NAA+NON-PROBE: NOT DETECTED
B PERT.PT PRMT NPH QL NAA+NON-PROBE: NOT DETECTED
BACTERIA URNS QL MICRO: ABNORMAL /HPF
BARBITURATES UR QL SCN: NOT DETECTED
BASOPHILS # BLD: 0.07 E9/L (ref 0–0.2)
BASOPHILS NFR BLD: 0.5 % (ref 0–2)
BENZODIAZ UR QL SCN: NOT DETECTED
BILIRUB SERPL-MCNC: 0.2 MG/DL (ref 0–1.2)
BILIRUB UR QL STRIP: NEGATIVE
BUN SERPL-MCNC: 12 MG/DL (ref 6–20)
C PNEUM DNA NPH QL NAA+NON-PROBE: NOT DETECTED
CALCIUM SERPL-MCNC: 9.8 MG/DL (ref 8.6–10.2)
CANNABINOIDS UR QL SCN: NOT DETECTED
CHLORIDE SERPL-SCNC: 100 MMOL/L (ref 98–107)
CLARITY UR: CLEAR
CO2 SERPL-SCNC: 26 MMOL/L (ref 22–29)
COCAINE UR QL SCN: NOT DETECTED
COLOR UR: YELLOW
CREAT SERPL-MCNC: 0.6 MG/DL (ref 0.7–1.2)
DRUG SCREEN COMMENT UR-IMP: NORMAL
EOSINOPHIL # BLD: 0.29 E9/L (ref 0.05–0.5)
EOSINOPHIL NFR BLD: 2.3 % (ref 0–6)
ERYTHROCYTE [DISTWIDTH] IN BLOOD BY AUTOMATED COUNT: 14.1 FL (ref 11.5–15)
ETHANOLAMINE SERPL-MCNC: <10 MG/DL (ref 0–0.08)
FENTANYL SCREEN, URINE: NOT DETECTED
FLUAV RNA NPH QL NAA+NON-PROBE: NOT DETECTED
FLUBV RNA NPH QL NAA+NON-PROBE: NOT DETECTED
GLUCOSE SERPL-MCNC: 114 MG/DL (ref 74–99)
GLUCOSE UR STRIP-MCNC: >=1000 MG/DL
HADV DNA NPH QL NAA+NON-PROBE: NOT DETECTED
HCOV 229E RNA NPH QL NAA+NON-PROBE: NOT DETECTED
HCOV HKU1 RNA NPH QL NAA+NON-PROBE: NOT DETECTED
HCOV NL63 RNA NPH QL NAA+NON-PROBE: NOT DETECTED
HCOV OC43 RNA NPH QL NAA+NON-PROBE: NOT DETECTED
HCT VFR BLD AUTO: 44.1 % (ref 37–54)
HGB BLD-MCNC: 14.8 G/DL (ref 12.5–16.5)
HGB UR QL STRIP: NEGATIVE
HMPV RNA NPH QL NAA+NON-PROBE: NOT DETECTED
HPIV1 RNA NPH QL NAA+NON-PROBE: NOT DETECTED
HPIV2 RNA NPH QL NAA+NON-PROBE: NOT DETECTED
HPIV3 RNA NPH QL NAA+NON-PROBE: NOT DETECTED
HPIV4 RNA NPH QL NAA+NON-PROBE: NOT DETECTED
IMM GRANULOCYTES # BLD: 0.09 E9/L
IMM GRANULOCYTES NFR BLD: 0.7 % (ref 0–5)
KETONES UR STRIP-MCNC: NEGATIVE MG/DL
LEUKOCYTE ESTERASE UR QL STRIP: NEGATIVE
LYMPHOCYTES # BLD: 4.57 E9/L (ref 1.5–4)
LYMPHOCYTES NFR BLD: 35.6 % (ref 20–42)
M PNEUMO DNA NPH QL NAA+NON-PROBE: NOT DETECTED
MCH RBC QN AUTO: 30.6 PG (ref 26–35)
MCHC RBC AUTO-ENTMCNC: 33.6 % (ref 32–34.5)
MCV RBC AUTO: 91.1 FL (ref 80–99.9)
METER GLUCOSE: 132 MG/DL (ref 74–99)
METHADONE UR QL SCN: NOT DETECTED
MONOCYTES # BLD: 0.96 E9/L (ref 0.1–0.95)
MONOCYTES NFR BLD: 7.5 % (ref 2–12)
NEUTROPHILS # BLD: 6.85 E9/L (ref 1.8–7.3)
NEUTS SEG NFR BLD: 53.4 % (ref 43–80)
NITRITE UR QL STRIP: NEGATIVE
OPIATES UR QL SCN: NOT DETECTED
OXYCODONE URINE: NOT DETECTED
PCP UR QL SCN: NOT DETECTED
PH UR STRIP: 7 [PH] (ref 5–9)
PLATELET # BLD AUTO: 260 E9/L (ref 130–450)
PMV BLD AUTO: 11 FL (ref 7–12)
POTASSIUM SERPL-SCNC: 4.6 MMOL/L (ref 3.5–5)
PROT SERPL-MCNC: 7.4 G/DL (ref 6.4–8.3)
PROT UR STRIP-MCNC: NEGATIVE MG/DL
RBC # BLD AUTO: 4.84 E12/L (ref 3.8–5.8)
RBC #/AREA URNS HPF: ABNORMAL /HPF (ref 0–2)
RSV RNA NPH QL NAA+NON-PROBE: NOT DETECTED
RV+EV RNA NPH QL NAA+NON-PROBE: NOT DETECTED
SALICYLATES SERPL-MCNC: <0.3 MG/DL (ref 0–30)
SARS-COV-2 RNA NPH QL NAA+NON-PROBE: NOT DETECTED
SODIUM SERPL-SCNC: 138 MMOL/L (ref 132–146)
SP GR UR STRIP: 1.01 (ref 1–1.03)
TRICYCLIC ANTIDEPRESSANTS SCREEN SERUM: NEGATIVE NG/ML
UROBILINOGEN UR STRIP-ACNC: 0.2 E.U./DL
VALPROATE SERPL-MCNC: 46 MCG/ML (ref 50–100)
WBC # BLD: 12.8 E9/L (ref 4.5–11.5)
WBC #/AREA URNS HPF: ABNORMAL /HPF (ref 0–5)

## 2023-03-22 PROCEDURE — 81001 URINALYSIS AUTO W/SCOPE: CPT

## 2023-03-22 PROCEDURE — 80143 DRUG ASSAY ACETAMINOPHEN: CPT

## 2023-03-22 PROCEDURE — 1240000000 HC EMOTIONAL WELLNESS R&B

## 2023-03-22 PROCEDURE — 80179 DRUG ASSAY SALICYLATE: CPT

## 2023-03-22 PROCEDURE — 80307 DRUG TEST PRSMV CHEM ANLYZR: CPT

## 2023-03-22 PROCEDURE — 93005 ELECTROCARDIOGRAM TRACING: CPT | Performed by: EMERGENCY MEDICINE

## 2023-03-22 PROCEDURE — 85025 COMPLETE CBC W/AUTO DIFF WBC: CPT

## 2023-03-22 PROCEDURE — 80164 ASSAY DIPROPYLACETIC ACD TOT: CPT

## 2023-03-22 PROCEDURE — 0202U NFCT DS 22 TRGT SARS-COV-2: CPT

## 2023-03-22 PROCEDURE — 36415 COLL VENOUS BLD VENIPUNCTURE: CPT

## 2023-03-22 PROCEDURE — 99285 EMERGENCY DEPT VISIT HI MDM: CPT

## 2023-03-22 PROCEDURE — 80053 COMPREHEN METABOLIC PANEL: CPT

## 2023-03-22 PROCEDURE — 82962 GLUCOSE BLOOD TEST: CPT

## 2023-03-22 PROCEDURE — 82077 ASSAY SPEC XCP UR&BREATH IA: CPT

## 2023-03-22 RX ORDER — BENZTROPINE MESYLATE 1 MG/ML
2 INJECTION INTRAMUSCULAR; INTRAVENOUS 2 TIMES DAILY PRN
Status: DISCONTINUED | OUTPATIENT
Start: 2023-03-22 | End: 2023-03-28 | Stop reason: HOSPADM

## 2023-03-22 RX ORDER — HALOPERIDOL 5 MG/ML
5 INJECTION INTRAMUSCULAR EVERY 4 HOURS PRN
Status: DISCONTINUED | OUTPATIENT
Start: 2023-03-22 | End: 2023-03-28 | Stop reason: HOSPADM

## 2023-03-22 RX ORDER — MAGNESIUM HYDROXIDE/ALUMINUM HYDROXICE/SIMETHICONE 120; 1200; 1200 MG/30ML; MG/30ML; MG/30ML
30 SUSPENSION ORAL EVERY 6 HOURS PRN
Status: DISCONTINUED | OUTPATIENT
Start: 2023-03-22 | End: 2023-03-28 | Stop reason: HOSPADM

## 2023-03-22 RX ORDER — HALOPERIDOL 5 MG/1
5 TABLET ORAL EVERY 4 HOURS PRN
Status: DISCONTINUED | OUTPATIENT
Start: 2023-03-22 | End: 2023-03-28 | Stop reason: HOSPADM

## 2023-03-22 RX ORDER — HYDROXYZINE PAMOATE 50 MG/1
50 CAPSULE ORAL 3 TIMES DAILY PRN
Status: DISCONTINUED | OUTPATIENT
Start: 2023-03-22 | End: 2023-03-28 | Stop reason: HOSPADM

## 2023-03-22 RX ORDER — TRAZODONE HYDROCHLORIDE 50 MG/1
50 TABLET ORAL NIGHTLY PRN
Status: DISCONTINUED | OUTPATIENT
Start: 2023-03-22 | End: 2023-03-26

## 2023-03-22 RX ORDER — ACETAMINOPHEN 325 MG/1
650 TABLET ORAL EVERY 4 HOURS PRN
Status: DISCONTINUED | OUTPATIENT
Start: 2023-03-22 | End: 2023-03-28 | Stop reason: HOSPADM

## 2023-03-22 ASSESSMENT — LIFESTYLE VARIABLES
HOW OFTEN DO YOU HAVE A DRINK CONTAINING ALCOHOL: NEVER
HOW MANY STANDARD DRINKS CONTAINING ALCOHOL DO YOU HAVE ON A TYPICAL DAY: PATIENT DOES NOT DRINK
HOW MANY STANDARD DRINKS CONTAINING ALCOHOL DO YOU HAVE ON A TYPICAL DAY: PATIENT DOES NOT DRINK
HOW OFTEN DO YOU HAVE A DRINK CONTAINING ALCOHOL: NEVER

## 2023-03-22 ASSESSMENT — SLEEP AND FATIGUE QUESTIONNAIRES
DO YOU USE A SLEEP AID: NO
DO YOU HAVE DIFFICULTY SLEEPING: NO
AVERAGE NUMBER OF SLEEP HOURS: 6

## 2023-03-22 NOTE — LETTER
[address] [place] [address] Way  Phone: [phone]         March 28, 2023     Patient: Jose Gonzalez   YOB: 1970   Date of Visit: 3/22/2023       To Whom It May Concern:    Please be advised that Hayley Goddard was admitted to [facility] in [facility] on 3/22/2023 and discharged on 3/28/2023. Sincerely,      Roni NIETO, LSW.            Signature:__________________________________
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
## 2023-03-22 NOTE — ED NOTES
Pt admitted to Dr Kelley Quijano service, Cristiana Saavedra on [address] aware of pending admission, Creighton Goldberg in admitting advised of assigned bed.      [address], [facility], [facility], [place]  03/22/23 6428

## 2023-03-22 NOTE — ED NOTES
Call from pt [facility] breana Miller [phone]. Reports pt lives in a supervised apartment in [place] called [address]. Reports pt has begun to destabilized over past 10 days, had requested to self-monitor his medication, was given the opportunity, past 48 hours has begun to destabilize rapidly. Reports pt has a fixed delusion that individuals Meenakshi Vega and Sophia Vicente are coming into his apartment and stealing items as well as damaging things. James Cagle reports this Delusion has been in place quite some time as these individuals are people he grew up with but that over past 48-72 hours he has become animated and insistent about the fact and has begun to state he will harm them.        [address], [facility], [facility]  03/22/23 2118

## 2023-03-22 NOTE — ED NOTES
Pt pink-slipped by Dr Artis Galindo due to thoughts of hurting others.       [address] [place] [address] Blvd, MSW, [facility]  03/22/23 [id number] [facility], [facility], [place]  03/22/23 4927

## 2023-03-23 PROBLEM — F25.9 SCHIZO AFFECTIVE SCHIZOPHRENIA (HCC): Status: RESOLVED | Noted: 2023-03-22 | Resolved: 2023-03-23

## 2023-03-23 PROBLEM — F29 PSYCHOSIS (HCC): Status: RESOLVED | Noted: 2018-09-18 | Resolved: 2023-03-23

## 2023-03-23 LAB
EKG ATRIAL RATE: 74 BPM
EKG P AXIS: 67 DEGREES
EKG P-R INTERVAL: 164 MS
EKG Q-T INTERVAL: 376 MS
EKG QRS DURATION: 90 MS
EKG QTC CALCULATION (BAZETT): 417 MS
EKG R AXIS: 44 DEGREES
EKG T AXIS: 63 DEGREES
EKG VENTRICULAR RATE: 74 BPM

## 2023-03-23 PROCEDURE — 93010 ELECTROCARDIOGRAM REPORT: CPT | Performed by: INTERNAL MEDICINE

## 2023-03-23 PROCEDURE — 1240000000 HC EMOTIONAL WELLNESS R&B

## 2023-03-23 PROCEDURE — 6370000000 HC RX 637 (ALT 250 FOR IP): Performed by: NURSE PRACTITIONER

## 2023-03-23 PROCEDURE — 90792 PSYCH DIAG EVAL W/MED SRVCS: CPT | Performed by: NURSE PRACTITIONER

## 2023-03-23 PROCEDURE — 6370000000 HC RX 637 (ALT 250 FOR IP): Performed by: PSYCHIATRY & NEUROLOGY

## 2023-03-23 RX ORDER — FLUTICASONE PROPIONATE 50 MCG
1 SPRAY, SUSPENSION (ML) NASAL DAILY
Status: DISCONTINUED | OUTPATIENT
Start: 2023-03-24 | End: 2023-03-28 | Stop reason: HOSPADM

## 2023-03-23 RX ORDER — ALBUTEROL SULFATE 90 UG/1
2 AEROSOL, METERED RESPIRATORY (INHALATION) EVERY 6 HOURS PRN
Status: DISCONTINUED | OUTPATIENT
Start: 2023-03-23 | End: 2023-03-23 | Stop reason: CLARIF

## 2023-03-23 RX ORDER — CETIRIZINE HYDROCHLORIDE 10 MG/1
10 TABLET ORAL DAILY
Status: DISCONTINUED | OUTPATIENT
Start: 2023-03-24 | End: 2023-03-28 | Stop reason: HOSPADM

## 2023-03-23 RX ORDER — TAMSULOSIN HYDROCHLORIDE 0.4 MG/1
0.4 CAPSULE ORAL DAILY
Status: DISCONTINUED | OUTPATIENT
Start: 2023-03-23 | End: 2023-03-23

## 2023-03-23 RX ORDER — TAMSULOSIN HYDROCHLORIDE 0.4 MG/1
0.8 CAPSULE ORAL NIGHTLY
Status: DISCONTINUED | OUTPATIENT
Start: 2023-03-23 | End: 2023-03-28 | Stop reason: HOSPADM

## 2023-03-23 RX ORDER — TROSPIUM CHLORIDE 20 MG/1
20 TABLET, FILM COATED ORAL
Status: DISCONTINUED | OUTPATIENT
Start: 2023-03-24 | End: 2023-03-26

## 2023-03-23 RX ORDER — VITAMIN B COMPLEX
5000 TABLET ORAL DAILY
Status: DISCONTINUED | OUTPATIENT
Start: 2023-03-24 | End: 2023-03-28 | Stop reason: HOSPADM

## 2023-03-23 RX ORDER — MONTELUKAST SODIUM 10 MG/1
10 TABLET ORAL NIGHTLY
Status: DISCONTINUED | OUTPATIENT
Start: 2023-03-24 | End: 2023-03-28 | Stop reason: HOSPADM

## 2023-03-23 RX ORDER — LISINOPRIL 10 MG/1
20 TABLET ORAL DAILY
Status: DISCONTINUED | OUTPATIENT
Start: 2023-03-24 | End: 2023-03-28 | Stop reason: HOSPADM

## 2023-03-23 RX ORDER — FLUTICASONE PROPIONATE 50 MCG
1 SPRAY, SUSPENSION (ML) NASAL DAILY
COMMUNITY

## 2023-03-23 RX ORDER — GABAPENTIN 100 MG/1
100 CAPSULE ORAL 2 TIMES DAILY
Status: DISCONTINUED | OUTPATIENT
Start: 2023-03-23 | End: 2023-03-28 | Stop reason: HOSPADM

## 2023-03-23 RX ORDER — VIBEGRON 75 MG/1
75 TABLET, FILM COATED ORAL DAILY
Status: ON HOLD | COMMUNITY
End: 2023-03-28 | Stop reason: HOSPADM

## 2023-03-23 RX ORDER — IBUPROFEN 600 MG/1
600 TABLET ORAL EVERY 6 HOURS
COMMUNITY

## 2023-03-23 RX ORDER — QUETIAPINE FUMARATE 100 MG/1
100 TABLET, FILM COATED ORAL NIGHTLY
Status: DISCONTINUED | OUTPATIENT
Start: 2023-03-23 | End: 2023-03-28 | Stop reason: HOSPADM

## 2023-03-23 RX ORDER — DIVALPROEX SODIUM 500 MG/1
500 TABLET, DELAYED RELEASE ORAL 3 TIMES DAILY
Status: DISCONTINUED | OUTPATIENT
Start: 2023-03-23 | End: 2023-03-28 | Stop reason: HOSPADM

## 2023-03-23 RX ORDER — AMOXICILLIN 500 MG
1200 CAPSULE ORAL DAILY
COMMUNITY

## 2023-03-23 RX ORDER — PROPRANOLOL HCL 60 MG
60 CAPSULE, EXTENDED RELEASE 24HR ORAL DAILY
Status: DISCONTINUED | OUTPATIENT
Start: 2023-03-24 | End: 2023-03-28 | Stop reason: HOSPADM

## 2023-03-23 RX ORDER — BENZTROPINE MESYLATE 1 MG/1
1 TABLET ORAL 2 TIMES DAILY
Status: DISCONTINUED | OUTPATIENT
Start: 2023-03-23 | End: 2023-03-23

## 2023-03-23 RX ORDER — PANTOPRAZOLE SODIUM 40 MG/1
40 TABLET, DELAYED RELEASE ORAL
Status: DISCONTINUED | OUTPATIENT
Start: 2023-03-24 | End: 2023-03-28 | Stop reason: HOSPADM

## 2023-03-23 RX ORDER — MIRTAZAPINE 15 MG/1
7.5 TABLET, FILM COATED ORAL NIGHTLY
Status: ON HOLD | COMMUNITY
End: 2023-03-23

## 2023-03-23 RX ORDER — ALBUTEROL SULFATE 2.5 MG/3ML
2.5 SOLUTION RESPIRATORY (INHALATION) EVERY 6 HOURS PRN
Status: DISCONTINUED | OUTPATIENT
Start: 2023-03-23 | End: 2023-03-28 | Stop reason: HOSPADM

## 2023-03-23 RX ORDER — M-VIT,TX,IRON,MINS/CALC/FOLIC 27MG-0.4MG
1 TABLET ORAL DAILY
COMMUNITY

## 2023-03-23 RX ORDER — ATORVASTATIN CALCIUM 10 MG/1
20 TABLET, FILM COATED ORAL DAILY
Status: DISCONTINUED | OUTPATIENT
Start: 2023-03-24 | End: 2023-03-28 | Stop reason: HOSPADM

## 2023-03-23 RX ORDER — PROPRANOLOL HCL 60 MG
60 CAPSULE, EXTENDED RELEASE 24HR ORAL DAILY
COMMUNITY

## 2023-03-23 RX ADMIN — METFORMIN HYDROCHLORIDE 1000 MG: 1000 TABLET ORAL at 17:49

## 2023-03-23 RX ADMIN — TRAZODONE HYDROCHLORIDE 50 MG: 50 TABLET ORAL at 20:56

## 2023-03-23 RX ADMIN — GABAPENTIN 100 MG: 100 CAPSULE ORAL at 17:48

## 2023-03-23 RX ADMIN — QUETIAPINE FUMARATE 100 MG: 100 TABLET ORAL at 20:56

## 2023-03-23 RX ADMIN — DIVALPROEX SODIUM 500 MG: 500 TABLET, DELAYED RELEASE ORAL at 20:56

## 2023-03-23 RX ADMIN — DIVALPROEX SODIUM 500 MG: 500 TABLET, DELAYED RELEASE ORAL at 14:31

## 2023-03-23 RX ADMIN — TAMSULOSIN HYDROCHLORIDE 0.8 MG: 0.4 CAPSULE ORAL at 20:56

## 2023-03-23 ASSESSMENT — SLEEP AND FATIGUE QUESTIONNAIRES
AVERAGE NUMBER OF SLEEP HOURS: 6
DO YOU HAVE DIFFICULTY SLEEPING: NO
DO YOU USE A SLEEP AID: NO

## 2023-03-23 ASSESSMENT — LIFESTYLE VARIABLES
HOW MANY STANDARD DRINKS CONTAINING ALCOHOL DO YOU HAVE ON A TYPICAL DAY: PATIENT DOES NOT DRINK
HOW OFTEN DO YOU HAVE A DRINK CONTAINING ALCOHOL: NEVER
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
## 2023-03-23 NOTE — ED NOTES
Nurse to nurse given to Anny Boles, [facility] on [facility]  03/22/23 2026

## 2023-03-23 NOTE — GROUP NOTE
Group Therapy Note    Date: 3/23/2023  Start Time: 1115  End Time:  1815  Number of Participants: 8    Type of Group: Psychoeducation    Wellness Binder Information  Module Name:  Steps to Positive well being. Patient's Goal:  ID ways to improve one's well being emotionally and physically. Demonstrate knowledge of coping mechanisms through discussion. Notes:  Patient engaged in discussion of well being and was able to identify ways to enhance well being. Status After Intervention:  Improved    Participation Level:  Active Listener    Participation Quality: Appropriate and Attentive      Speech:  normal      Thought Process/Content: Logical      Affective Functioning: Congruent      Mood: calm and cooperative      Level of consciousness:  Alert and Attentive      Response to Learning: Able to verbalize current knowledge/experience and Able to verbalize/acknowledge new learning      Endings: None Reported    Modes of Intervention: Education      Discipline Responsible: Psychoeducational Specialist      Signature:  Rishabh Manning, [address]

## 2023-03-24 LAB
CHOLESTEROL, TOTAL: 170 MG/DL (ref 0–199)
HBA1C MFR BLD: 6.8 % (ref 4–5.6)
HDLC SERPL-MCNC: 36 MG/DL
LDLC SERPL CALC-MCNC: 78 MG/DL (ref 0–99)
TRIGL SERPL-MCNC: 278 MG/DL (ref 0–149)
VLDLC SERPL CALC-MCNC: 56 MG/DL

## 2023-03-24 PROCEDURE — 6370000000 HC RX 637 (ALT 250 FOR IP): Performed by: NURSE PRACTITIONER

## 2023-03-24 PROCEDURE — 99231 SBSQ HOSP IP/OBS SF/LOW 25: CPT | Performed by: NURSE PRACTITIONER

## 2023-03-24 PROCEDURE — 6370000000 HC RX 637 (ALT 250 FOR IP): Performed by: PSYCHIATRY & NEUROLOGY

## 2023-03-24 PROCEDURE — 83036 HEMOGLOBIN GLYCOSYLATED A1C: CPT

## 2023-03-24 PROCEDURE — 36415 COLL VENOUS BLD VENIPUNCTURE: CPT

## 2023-03-24 PROCEDURE — 1240000000 HC EMOTIONAL WELLNESS R&B

## 2023-03-24 PROCEDURE — 80061 LIPID PANEL: CPT

## 2023-03-24 RX ADMIN — TROSPIUM CHLORIDE 20 MG: 20 TABLET, FILM COATED ORAL at 15:15

## 2023-03-24 RX ADMIN — HYDROXYZINE PAMOATE 50 MG: 50 CAPSULE ORAL at 21:54

## 2023-03-24 RX ADMIN — MONTELUKAST 10 MG: 10 TABLET, FILM COATED ORAL at 21:54

## 2023-03-24 RX ADMIN — CETIRIZINE HYDROCHLORIDE 10 MG: 10 TABLET, FILM COATED ORAL at 08:56

## 2023-03-24 RX ADMIN — Medication 5000 UNITS: at 08:57

## 2023-03-24 RX ADMIN — DIVALPROEX SODIUM 500 MG: 500 TABLET, DELAYED RELEASE ORAL at 13:33

## 2023-03-24 RX ADMIN — TRAZODONE HYDROCHLORIDE 50 MG: 50 TABLET ORAL at 21:54

## 2023-03-24 RX ADMIN — PANTOPRAZOLE SODIUM 40 MG: 40 TABLET, DELAYED RELEASE ORAL at 06:17

## 2023-03-24 RX ADMIN — METFORMIN HYDROCHLORIDE 1000 MG: 1000 TABLET ORAL at 08:56

## 2023-03-24 RX ADMIN — TROSPIUM CHLORIDE 20 MG: 20 TABLET, FILM COATED ORAL at 06:29

## 2023-03-24 RX ADMIN — ATORVASTATIN CALCIUM 20 MG: 10 TABLET, FILM COATED ORAL at 08:57

## 2023-03-24 RX ADMIN — DIVALPROEX SODIUM 500 MG: 500 TABLET, DELAYED RELEASE ORAL at 21:54

## 2023-03-24 RX ADMIN — QUETIAPINE FUMARATE 100 MG: 100 TABLET ORAL at 21:54

## 2023-03-24 RX ADMIN — FLUTICASONE PROPIONATE 1 SPRAY: 50 SPRAY, METERED NASAL at 08:56

## 2023-03-24 RX ADMIN — SERTRALINE 100 MG: 50 TABLET, FILM COATED ORAL at 08:56

## 2023-03-24 RX ADMIN — PROPRANOLOL HYDROCHLORIDE 60 MG: 60 CAPSULE, EXTENDED RELEASE ORAL at 08:56

## 2023-03-24 RX ADMIN — ACETAMINOPHEN 650 MG: 325 TABLET ORAL at 04:50

## 2023-03-24 RX ADMIN — METFORMIN HYDROCHLORIDE 1000 MG: 1000 TABLET ORAL at 17:24

## 2023-03-24 RX ADMIN — ACETAMINOPHEN 650 MG: 325 TABLET ORAL at 22:07

## 2023-03-24 RX ADMIN — LISINOPRIL 20 MG: 10 TABLET ORAL at 08:56

## 2023-03-24 RX ADMIN — ACETAMINOPHEN 650 MG: 325 TABLET ORAL at 09:57

## 2023-03-24 RX ADMIN — TAMSULOSIN HYDROCHLORIDE 0.8 MG: 0.4 CAPSULE ORAL at 21:54

## 2023-03-24 RX ADMIN — GABAPENTIN 100 MG: 100 CAPSULE ORAL at 17:24

## 2023-03-24 RX ADMIN — GABAPENTIN 100 MG: 100 CAPSULE ORAL at 08:57

## 2023-03-24 RX ADMIN — DIVALPROEX SODIUM 500 MG: 500 TABLET, DELAYED RELEASE ORAL at 09:00

## 2023-03-24 ASSESSMENT — PAIN DESCRIPTION - DESCRIPTORS
DESCRIPTORS: SHARP;DULL
DESCRIPTORS: DULL;SHARP
DESCRIPTORS: ACHING
DESCRIPTORS: ACHING

## 2023-03-24 ASSESSMENT — PAIN DESCRIPTION - LOCATION
LOCATION: BACK
LOCATION: BACK;GROIN
LOCATION: GROIN
LOCATION: BACK

## 2023-03-24 ASSESSMENT — PAIN SCALES - GENERAL
PAINLEVEL_OUTOF10: 6
PAINLEVEL_OUTOF10: 5
PAINLEVEL_OUTOF10: 5
PAINLEVEL_OUTOF10: 2
PAINLEVEL_OUTOF10: 5
PAINLEVEL_OUTOF10: 2
PAINLEVEL_OUTOF10: 0

## 2023-03-24 ASSESSMENT — PAIN - FUNCTIONAL ASSESSMENT: PAIN_FUNCTIONAL_ASSESSMENT: ACTIVITIES ARE NOT PREVENTED

## 2023-03-24 ASSESSMENT — PAIN SCALES - WONG BAKER
WONGBAKER_NUMERICALRESPONSE: 0
WONGBAKER_NUMERICALRESPONSE: 0

## 2023-03-24 ASSESSMENT — PAIN DESCRIPTION - ORIENTATION
ORIENTATION: LOWER;MID
ORIENTATION: LOWER;MID
ORIENTATION: MID

## 2023-03-24 NOTE — GROUP NOTE
Group Therapy Note     Date: 3/24/2023     Group Start Time: 1000  Group End Time: 1050  Group Topic: Psychoeducation     [facility]    GERSON Woodward LSW           Group Therapy Note     Attendees: 9           Patient's Goal:  Identifying and combating cognitive distortions     Notes:  pt was attentive and verbal in group. He was able to respond when prompted     Status After Intervention:  Improved     Participation Level:  Active Listener and Interactive     Participation Quality: Appropriate and Attentive        Speech:  hesitant        Thought Process/Content: Linear        Affective Functioning: Congruent        Mood: anxious        Level of consciousness:  Alert        Response to Learning: Able to retain information        Endings: None Reported     Modes of Intervention: Education, Support, Socialization, Exploration, and Problem-solving        Discipline Responsible: /Counselor        Signature:  GERSON Woodward, ZOË

## 2023-03-24 NOTE — GROUP NOTE
Group Therapy Note    Date: 3/24/2023    Group Start Time: 1410  Group End Time: 9336  Group Topic: Cognitive Skills    [facility]    GERSON Whitman LSW        Group Therapy Note    Attendees: 6       Patient's Goal: to participate in group discussion on tips for healthy boundaries. Notes: Pt was an active participant in group discussion. Status After Intervention:  Improved    Participation Level:  Active Listener and Interactive    Participation Quality: Appropriate and Attentive      Speech:  normal      Thought Process/Content: Logical      Affective Functioning: Congruent      Mood: anxious      Level of consciousness:  Alert and Oriented x4      Response to Learning: Able to verbalize current knowledge/experience      Endings: None Reported    Modes of Intervention: Education, Support, Socialization, Exploration, Clarifying, and Problem-solving      Discipline Responsible: /Counselor      Signature:  GERSON Barnhart LSW

## 2023-03-25 PROCEDURE — 6370000000 HC RX 637 (ALT 250 FOR IP): Performed by: NURSE PRACTITIONER

## 2023-03-25 PROCEDURE — 6370000000 HC RX 637 (ALT 250 FOR IP): Performed by: PSYCHIATRY & NEUROLOGY

## 2023-03-25 PROCEDURE — 1240000000 HC EMOTIONAL WELLNESS R&B

## 2023-03-25 RX ORDER — QUETIAPINE FUMARATE 25 MG/1
50 TABLET, FILM COATED ORAL DAILY
Status: DISCONTINUED | OUTPATIENT
Start: 2023-03-25 | End: 2023-03-28 | Stop reason: HOSPADM

## 2023-03-25 RX ADMIN — FLUTICASONE PROPIONATE 1 SPRAY: 50 SPRAY, METERED NASAL at 10:09

## 2023-03-25 RX ADMIN — PROPRANOLOL HYDROCHLORIDE 60 MG: 60 CAPSULE, EXTENDED RELEASE ORAL at 10:10

## 2023-03-25 RX ADMIN — TROSPIUM CHLORIDE 20 MG: 20 TABLET, FILM COATED ORAL at 16:07

## 2023-03-25 RX ADMIN — DIVALPROEX SODIUM 500 MG: 500 TABLET, DELAYED RELEASE ORAL at 10:10

## 2023-03-25 RX ADMIN — MONTELUKAST 10 MG: 10 TABLET, FILM COATED ORAL at 22:31

## 2023-03-25 RX ADMIN — METFORMIN HYDROCHLORIDE 1000 MG: 1000 TABLET ORAL at 16:07

## 2023-03-25 RX ADMIN — DIVALPROEX SODIUM 500 MG: 500 TABLET, DELAYED RELEASE ORAL at 22:31

## 2023-03-25 RX ADMIN — CETIRIZINE HYDROCHLORIDE 10 MG: 10 TABLET, FILM COATED ORAL at 10:10

## 2023-03-25 RX ADMIN — QUETIAPINE FUMARATE 100 MG: 100 TABLET ORAL at 22:31

## 2023-03-25 RX ADMIN — ACETAMINOPHEN 650 MG: 325 TABLET ORAL at 06:22

## 2023-03-25 RX ADMIN — QUETIAPINE FUMARATE 50 MG: 25 TABLET ORAL at 16:07

## 2023-03-25 RX ADMIN — PANTOPRAZOLE SODIUM 40 MG: 40 TABLET, DELAYED RELEASE ORAL at 06:22

## 2023-03-25 RX ADMIN — SERTRALINE 100 MG: 50 TABLET, FILM COATED ORAL at 10:09

## 2023-03-25 RX ADMIN — GABAPENTIN 100 MG: 100 CAPSULE ORAL at 17:09

## 2023-03-25 RX ADMIN — TROSPIUM CHLORIDE 20 MG: 20 TABLET, FILM COATED ORAL at 06:22

## 2023-03-25 RX ADMIN — TAMSULOSIN HYDROCHLORIDE 0.8 MG: 0.4 CAPSULE ORAL at 22:31

## 2023-03-25 RX ADMIN — HYDROXYZINE PAMOATE 50 MG: 50 CAPSULE ORAL at 12:55

## 2023-03-25 RX ADMIN — Medication 5000 UNITS: at 10:09

## 2023-03-25 RX ADMIN — ATORVASTATIN CALCIUM 20 MG: 10 TABLET, FILM COATED ORAL at 10:10

## 2023-03-25 RX ADMIN — DIVALPROEX SODIUM 500 MG: 500 TABLET, DELAYED RELEASE ORAL at 12:55

## 2023-03-25 RX ADMIN — METFORMIN HYDROCHLORIDE 1000 MG: 1000 TABLET ORAL at 10:10

## 2023-03-25 RX ADMIN — LISINOPRIL 20 MG: 10 TABLET ORAL at 10:10

## 2023-03-25 RX ADMIN — GABAPENTIN 100 MG: 100 CAPSULE ORAL at 10:09

## 2023-03-25 ASSESSMENT — PAIN DESCRIPTION - DESCRIPTORS: DESCRIPTORS: ACHING

## 2023-03-25 ASSESSMENT — PAIN - FUNCTIONAL ASSESSMENT: PAIN_FUNCTIONAL_ASSESSMENT: ACTIVITIES ARE NOT PREVENTED

## 2023-03-25 ASSESSMENT — PAIN DESCRIPTION - LOCATION: LOCATION: BACK

## 2023-03-25 ASSESSMENT — PAIN SCALES - GENERAL
PAINLEVEL_OUTOF10: 0
PAINLEVEL_OUTOF10: 0
PAINLEVEL_OUTOF10: 4

## 2023-03-25 ASSESSMENT — PAIN DESCRIPTION - ORIENTATION: ORIENTATION: MID;LOWER

## 2023-03-25 NOTE — GROUP NOTE
Group Therapy Note    Date: 3/25/2023  Start Time: 1115  End Time:  1150  Number of Participants: 8    Type of Group: Psychoeducation    Wellness Binder Information  Module Name:  Self-Love/Esteem    Patient's Goal:  Define self love/self-esteem. ID ways to improve self-love/esteem, and demonstrate use of daily affirmations     Notes:  Patient actively engaged in discussion of self-love, and maintained attentive behavior throughout group    Status After Intervention:  Improved  Participation Level:  Active Listener and Interactive    Participation Quality: Appropriate, Attentive, and Sharing      Speech:  normal      Thought Process/Content: Logical      Affective Functioning: Congruent      Mood: calm and cooperative      Level of consciousness:  Alert and Attentive      Response to Learning: Able to verbalize current knowledge/experience and Able to verbalize/acknowledge new learning      Endings: None Reported    Modes of Intervention: Education, Socialization, and Exploration      Discipline Responsible: Psychoeducational Specialist      Signature:  Domingo Mcleod, [address]

## 2023-03-26 PROCEDURE — 6370000000 HC RX 637 (ALT 250 FOR IP): Performed by: PSYCHIATRY & NEUROLOGY

## 2023-03-26 PROCEDURE — 6370000000 HC RX 637 (ALT 250 FOR IP): Performed by: NURSE PRACTITIONER

## 2023-03-26 PROCEDURE — 1240000000 HC EMOTIONAL WELLNESS R&B

## 2023-03-26 RX ORDER — TRAZODONE HYDROCHLORIDE 150 MG/1
75 TABLET ORAL NIGHTLY PRN
Status: DISCONTINUED | OUTPATIENT
Start: 2023-03-26 | End: 2023-03-28 | Stop reason: HOSPADM

## 2023-03-26 RX ADMIN — DIVALPROEX SODIUM 500 MG: 500 TABLET, DELAYED RELEASE ORAL at 14:43

## 2023-03-26 RX ADMIN — QUETIAPINE FUMARATE 50 MG: 25 TABLET ORAL at 08:56

## 2023-03-26 RX ADMIN — QUETIAPINE FUMARATE 100 MG: 100 TABLET ORAL at 22:55

## 2023-03-26 RX ADMIN — ACETAMINOPHEN 650 MG: 325 TABLET ORAL at 07:53

## 2023-03-26 RX ADMIN — GABAPENTIN 100 MG: 100 CAPSULE ORAL at 17:55

## 2023-03-26 RX ADMIN — METFORMIN HYDROCHLORIDE 1000 MG: 1000 TABLET ORAL at 08:56

## 2023-03-26 RX ADMIN — TAMSULOSIN HYDROCHLORIDE 0.8 MG: 0.4 CAPSULE ORAL at 22:54

## 2023-03-26 RX ADMIN — Medication 5000 UNITS: at 08:56

## 2023-03-26 RX ADMIN — DIVALPROEX SODIUM 500 MG: 500 TABLET, DELAYED RELEASE ORAL at 08:56

## 2023-03-26 RX ADMIN — LISINOPRIL 20 MG: 10 TABLET ORAL at 08:55

## 2023-03-26 RX ADMIN — GABAPENTIN 100 MG: 100 CAPSULE ORAL at 08:55

## 2023-03-26 RX ADMIN — ATORVASTATIN CALCIUM 20 MG: 10 TABLET, FILM COATED ORAL at 08:56

## 2023-03-26 RX ADMIN — TRAZODONE HYDROCHLORIDE 75 MG: 150 TABLET ORAL at 23:10

## 2023-03-26 RX ADMIN — TRAZODONE HYDROCHLORIDE 50 MG: 50 TABLET ORAL at 00:16

## 2023-03-26 RX ADMIN — PANTOPRAZOLE SODIUM 40 MG: 40 TABLET, DELAYED RELEASE ORAL at 06:53

## 2023-03-26 RX ADMIN — TROSPIUM CHLORIDE 20 MG: 20 TABLET, FILM COATED ORAL at 06:53

## 2023-03-26 RX ADMIN — PROPRANOLOL HYDROCHLORIDE 60 MG: 60 CAPSULE, EXTENDED RELEASE ORAL at 08:55

## 2023-03-26 RX ADMIN — SERTRALINE 50 MG: 50 TABLET, FILM COATED ORAL at 08:55

## 2023-03-26 RX ADMIN — CETIRIZINE HYDROCHLORIDE 10 MG: 10 TABLET, FILM COATED ORAL at 08:56

## 2023-03-26 RX ADMIN — DIVALPROEX SODIUM 500 MG: 500 TABLET, DELAYED RELEASE ORAL at 22:54

## 2023-03-26 RX ADMIN — HYDROXYZINE PAMOATE 50 MG: 50 CAPSULE ORAL at 22:55

## 2023-03-26 RX ADMIN — FLUTICASONE PROPIONATE 1 SPRAY: 50 SPRAY, METERED NASAL at 09:00

## 2023-03-26 RX ADMIN — MONTELUKAST 10 MG: 10 TABLET, FILM COATED ORAL at 22:55

## 2023-03-26 RX ADMIN — METFORMIN HYDROCHLORIDE 1000 MG: 1000 TABLET ORAL at 17:55

## 2023-03-26 ASSESSMENT — PAIN SCALES - GENERAL
PAINLEVEL_OUTOF10: 5
PAINLEVEL_OUTOF10: 0
PAINLEVEL_OUTOF10: 0

## 2023-03-26 ASSESSMENT — PAIN DESCRIPTION - LOCATION: LOCATION: BACK

## 2023-03-26 NOTE — GROUP NOTE
Group Therapy Note    Date: 3/26/2023    Group Start Time: 1100  Group End Time: 1200  Group Topic: Cognitive Skills    [facility]    Thankful GERSON Garza, ZOË        Group Therapy Note    Attendees: 11       Patient's Goal:  Pt attended community support group and learned about trauma - symptoms of and treatments available. Notes:  Pt was an active participant in group therapy. They identified their daily goal as, \"think about what tomorrow may hold. \"    Status After Intervention:  Unchanged    Participation Level: Active Listener    Participation Quality: Appropriate and Attentive      Speech:  normal      Thought Process/Content: Logical      Affective Functioning: Congruent      Mood: euthymic      Level of consciousness:  Alert, Oriented x4, and Attentive      Response to Learning: Able to verbalize current knowledge/experience and Able to retain information      Endings: None Reported    Modes of Intervention: Education, Support, Socialization, Exploration, Clarifying, and Problem-solving      Discipline Responsible: /Counselor      Signature:   GERSON Last, ZOË

## 2023-03-26 NOTE — GROUP NOTE
Group Therapy Note    Date: 3/26/2023    Group Start Time: 1400  Group End Time: 3646  Group Topic: Cognitive Skills    [facility]    GERSON Fam LSW        Group Therapy Note    Attendees: 9       Goal: Recognizing feelings of anger and frustration and how to identify the cause and how to cope. Notes:  pt was attentive and verbal during group, he was able to share and support others    Status After Intervention:  Improved    Participation Level:  Active Listener and Interactive    Participation Quality: Appropriate, Attentive, and Sharing      Speech:  normal      Thought Process/Content: Logical      Affective Functioning: Congruent      Mood: euthymic      Level of consciousness:  Alert, Oriented x4, and Attentive      Response to Learning: Able to verbalize current knowledge/experience and Able to retain information      Endings: None Reported    Modes of Intervention: Education, Support, Socialization, Exploration, Clarifying, and Problem-solving      Discipline Responsible: /Counselor      Signature:  GERSON Fam LSW

## 2023-03-27 PROCEDURE — 6370000000 HC RX 637 (ALT 250 FOR IP): Performed by: NURSE PRACTITIONER

## 2023-03-27 PROCEDURE — 99232 SBSQ HOSP IP/OBS MODERATE 35: CPT | Performed by: NURSE PRACTITIONER

## 2023-03-27 PROCEDURE — 1240000000 HC EMOTIONAL WELLNESS R&B

## 2023-03-27 PROCEDURE — 6370000000 HC RX 637 (ALT 250 FOR IP): Performed by: PSYCHIATRY & NEUROLOGY

## 2023-03-27 RX ORDER — LANOLIN ALCOHOL/MO/W.PET/CERES
6 CREAM (GRAM) TOPICAL NIGHTLY PRN
Status: DISCONTINUED | OUTPATIENT
Start: 2023-03-27 | End: 2023-03-28 | Stop reason: HOSPADM

## 2023-03-27 RX ADMIN — ATORVASTATIN CALCIUM 20 MG: 10 TABLET, FILM COATED ORAL at 09:05

## 2023-03-27 RX ADMIN — ACETAMINOPHEN 650 MG: 325 TABLET ORAL at 09:06

## 2023-03-27 RX ADMIN — GABAPENTIN 100 MG: 100 CAPSULE ORAL at 09:06

## 2023-03-27 RX ADMIN — SERTRALINE 50 MG: 50 TABLET, FILM COATED ORAL at 09:05

## 2023-03-27 RX ADMIN — LISINOPRIL 20 MG: 10 TABLET ORAL at 09:05

## 2023-03-27 RX ADMIN — DIVALPROEX SODIUM 500 MG: 500 TABLET, DELAYED RELEASE ORAL at 09:06

## 2023-03-27 RX ADMIN — HYDROXYZINE PAMOATE 50 MG: 50 CAPSULE ORAL at 20:32

## 2023-03-27 RX ADMIN — Medication 5000 UNITS: at 09:05

## 2023-03-27 RX ADMIN — MELATONIN 3 MG ORAL TABLET 6 MG: 3 TABLET ORAL at 20:32

## 2023-03-27 RX ADMIN — TAMSULOSIN HYDROCHLORIDE 0.8 MG: 0.4 CAPSULE ORAL at 20:31

## 2023-03-27 RX ADMIN — QUETIAPINE FUMARATE 100 MG: 100 TABLET ORAL at 20:31

## 2023-03-27 RX ADMIN — DIVALPROEX SODIUM 500 MG: 500 TABLET, DELAYED RELEASE ORAL at 20:32

## 2023-03-27 RX ADMIN — FLUTICASONE PROPIONATE 1 SPRAY: 50 SPRAY, METERED NASAL at 09:04

## 2023-03-27 RX ADMIN — DIVALPROEX SODIUM 500 MG: 500 TABLET, DELAYED RELEASE ORAL at 13:29

## 2023-03-27 RX ADMIN — CETIRIZINE HYDROCHLORIDE 10 MG: 10 TABLET, FILM COATED ORAL at 09:10

## 2023-03-27 RX ADMIN — MONTELUKAST 10 MG: 10 TABLET, FILM COATED ORAL at 20:31

## 2023-03-27 RX ADMIN — QUETIAPINE FUMARATE 50 MG: 25 TABLET ORAL at 09:06

## 2023-03-27 RX ADMIN — GABAPENTIN 100 MG: 100 CAPSULE ORAL at 17:19

## 2023-03-27 RX ADMIN — METFORMIN HYDROCHLORIDE 1000 MG: 1000 TABLET ORAL at 09:06

## 2023-03-27 RX ADMIN — PROPRANOLOL HYDROCHLORIDE 60 MG: 60 CAPSULE, EXTENDED RELEASE ORAL at 09:05

## 2023-03-27 RX ADMIN — METFORMIN HYDROCHLORIDE 1000 MG: 1000 TABLET ORAL at 17:13

## 2023-03-27 RX ADMIN — PANTOPRAZOLE SODIUM 40 MG: 40 TABLET, DELAYED RELEASE ORAL at 06:59

## 2023-03-27 ASSESSMENT — PAIN SCALES - GENERAL
PAINLEVEL_OUTOF10: 0
PAINLEVEL_OUTOF10: 3

## 2023-03-27 ASSESSMENT — PAIN DESCRIPTION - DESCRIPTORS: DESCRIPTORS: ACHING

## 2023-03-27 ASSESSMENT — PAIN DESCRIPTION - LOCATION: LOCATION: BACK

## 2023-03-27 NOTE — GROUP NOTE
Group Therapy Note                                                Date: 3/27/2023  Start Time: 1105  End Time:  6059  Number of Participants: 11    Type of Group: Psychoeducation    Wellness Binder Information  Module Name:  Grounding Techniques, 67189 method    Patient's Goal:  Discuss grounding techniques and demonstrated use and knowledge of 37132 method and body scan. ID one grounding technique one can utilize in their life. Notes:  Patient actively engaged in discussion of what grounding is and techniques that can be used. Patient actively engaged in demonstration of two techniques including 27580, and body scanning. Status After Intervention:  Improved    Participation Level:  Active Listener and Minimal    Participation Quality: Attentive      Speech:  normal      Thought Process/Content: Logical      Affective Functioning: Congruent      Mood:  calm content      Level of consciousness:  Alert and Preoccupied      Response to Learning: Able to verbalize current knowledge/experience, Able to verbalize/acknowledge new learning, and Able to retain information      Endings: None Reported    Modes of Intervention: Education and Exploration      Discipline Responsible: Psychoeducational Specialist      Signature:  Charity Garza, [address]

## 2023-03-27 NOTE — DISCHARGE INSTRUCTIONS
Follow up for Tobacco Cessation at:    [facility]                              Date:  Tuesday 4/4 at 3pm  [address], [address]   [place], [place] [address]   Phone: [phone]   Fax: [phone]

## 2023-03-27 NOTE — GROUP NOTE
Group Therapy Note    Date: 3/27/2023    Group Start Time: 1400  Group End Time: 8207  Group Topic: Cognitive Skills    [facility]    GERSON Whitman LSW        Group Therapy Note    Attendees: 11       Patient's Goal: To participate in group discussion on positive steps to wellbeing. Notes: Pt was an active participant in group discussion. Status After Intervention:  Improved    Participation Level:  Active Listener and Interactive    Participation Quality: Appropriate, Attentive, and Sharing      Speech:  normal      Thought Process/Content: Logical      Affective Functioning: Congruent      Mood: anxious      Level of consciousness:  Alert and Oriented x4      Response to Learning: Able to verbalize current knowledge/experience      Endings: None Reported    Modes of Intervention: Education, Support, Socialization, Exploration, Clarifying, and Problem-solving      Discipline Responsible: /Counselor      Signature:  GERSON Hammonds LSW

## 2023-03-28 VITALS
BODY MASS INDEX: 35.21 KG/M2 | WEIGHT: 260 LBS | HEART RATE: 78 BPM | SYSTOLIC BLOOD PRESSURE: 134 MMHG | DIASTOLIC BLOOD PRESSURE: 100 MMHG | HEIGHT: 72 IN | TEMPERATURE: 97.2 F | RESPIRATION RATE: 16 BRPM | OXYGEN SATURATION: 100 %

## 2023-03-28 LAB — VALPROATE SERPL-MCNC: 43 MCG/ML (ref 50–100)

## 2023-03-28 PROCEDURE — 36415 COLL VENOUS BLD VENIPUNCTURE: CPT

## 2023-03-28 PROCEDURE — 99239 HOSP IP/OBS DSCHRG MGMT >30: CPT | Performed by: NURSE PRACTITIONER

## 2023-03-28 PROCEDURE — 6370000000 HC RX 637 (ALT 250 FOR IP): Performed by: NURSE PRACTITIONER

## 2023-03-28 PROCEDURE — 80164 ASSAY DIPROPYLACETIC ACD TOT: CPT

## 2023-03-28 RX ORDER — TRAZODONE HYDROCHLORIDE 150 MG/1
75 TABLET ORAL NIGHTLY PRN
Qty: 15 TABLET | Refills: 0 | Status: SHIPPED | OUTPATIENT
Start: 2023-03-28 | End: 2023-04-27

## 2023-03-28 RX ORDER — ARIPIPRAZOLE LAUROXIL 882 MG/3.2ML
882 INJECTION, SUSPENSION, EXTENDED RELEASE INTRAMUSCULAR
Qty: 3 ML | Refills: 0 | Status: SHIPPED | OUTPATIENT
Start: 2023-04-28 | End: 2023-03-28 | Stop reason: SDUPTHER

## 2023-03-28 RX ORDER — QUETIAPINE FUMARATE 50 MG/1
50 TABLET, FILM COATED ORAL DAILY
Qty: 60 TABLET | Refills: 3 | Status: SHIPPED | OUTPATIENT
Start: 2023-03-29

## 2023-03-28 RX ORDER — QUETIAPINE FUMARATE 100 MG/1
100 TABLET, FILM COATED ORAL NIGHTLY
Qty: 60 TABLET | Refills: 3 | Status: SHIPPED | OUTPATIENT
Start: 2023-03-28

## 2023-03-28 RX ORDER — ARIPIPRAZOLE LAUROXIL 882 MG/3.2ML
882 INJECTION, SUSPENSION, EXTENDED RELEASE INTRAMUSCULAR
Qty: 3 ML | Refills: 0 | Status: SHIPPED | OUTPATIENT
Start: 2023-04-28

## 2023-03-28 RX ADMIN — QUETIAPINE FUMARATE 50 MG: 25 TABLET ORAL at 09:32

## 2023-03-28 RX ADMIN — ATORVASTATIN CALCIUM 20 MG: 10 TABLET, FILM COATED ORAL at 09:31

## 2023-03-28 RX ADMIN — CETIRIZINE HYDROCHLORIDE 10 MG: 10 TABLET, FILM COATED ORAL at 09:35

## 2023-03-28 RX ADMIN — FLUTICASONE PROPIONATE 1 SPRAY: 50 SPRAY, METERED NASAL at 09:56

## 2023-03-28 RX ADMIN — SERTRALINE 50 MG: 50 TABLET, FILM COATED ORAL at 09:35

## 2023-03-28 RX ADMIN — DIVALPROEX SODIUM 500 MG: 500 TABLET, DELAYED RELEASE ORAL at 13:44

## 2023-03-28 RX ADMIN — PROPRANOLOL HYDROCHLORIDE 60 MG: 60 CAPSULE, EXTENDED RELEASE ORAL at 09:32

## 2023-03-28 RX ADMIN — DIVALPROEX SODIUM 500 MG: 500 TABLET, DELAYED RELEASE ORAL at 09:35

## 2023-03-28 RX ADMIN — Medication 5000 UNITS: at 09:29

## 2023-03-28 RX ADMIN — LISINOPRIL 20 MG: 10 TABLET ORAL at 09:34

## 2023-03-28 RX ADMIN — METFORMIN HYDROCHLORIDE 1000 MG: 1000 TABLET ORAL at 09:34

## 2023-03-28 RX ADMIN — PANTOPRAZOLE SODIUM 40 MG: 40 TABLET, DELAYED RELEASE ORAL at 06:41

## 2023-03-28 RX ADMIN — GABAPENTIN 100 MG: 100 CAPSULE ORAL at 09:31

## 2023-03-28 ASSESSMENT — PAIN SCALES - GENERAL: PAINLEVEL_OUTOF10: 0

## 2023-03-28 NOTE — PROGRESS NOTES
[facility]  Day 3 Interdisciplinary Treatment Plan NOTE    Review Date & Time: 3/25/2023 0900    Patient was in treatment team    Estimated Length of Stay Update:  3-5 days  Estimated Discharge Date Update: 3/28/2023    EDUCATION:   Learner Progress Toward Treatment Goals: Reviewed results and recommendations of this team, Reviewed group plan and strategies, and Reviewed signs, symptoms and risk of self harm and violent behavior    Method: Small group    Outcome: Verbalized understanding    PATIENT GOALS: \"attend all groups\"    PLAN/TREATMENT RECOMMENDATIONS UPDATE: encourage patient to attend and participate in groups.  Take medications as prescribed    GOALS UPDATE:   Time frame for Short-Term Goals:  prior to discharge      Jackson Salazar RN
[facility]  Discharge Note    Pt discharged with followings belongings:   Dental Appliances: None  Vision - Corrective Lenses: Eyeglasses, At bedside  Hearing Aid: None  Jewelry: Watch  Body Piercings Removed: N/A  Clothing: Footwear, Shirt, Pants  Other Valuables: Other (Comment) (none)   Valuables returned to patient. Patient educated on aftercare instructions: yes  Information faxed to n/a by n/A  at 3:09 PM .Patient verbalize understanding of AVS:  yes. Status EXAM upon discharge:  Mental Status and Behavioral Exam  Normal:  (pt resting with eyes closed)  Level of Assistance: Independent/Self  Facial Expression: Flat  Affect: Congruent  Level of Consciousness: Alert  Frequency of Checks: 4 times per hour, close  Mood:Normal: No  Mood: Anxious  Motor Activity:Normal: Yes  Motor Activity: Other (comment) (inability to sit still)  Eye Contact: Good  Observed Behavior: Friendly, Cooperative  Sexual Misconduct History: Current - no  Preception: Volin to person, Volin to time, Volin to place, Volin to situation  Attention:Normal: No  Attention: Distractible  Thought Processes: Other (comment) (impaired)  Thought Content:Normal: No  Thought Content: Preoccupations  Depression Symptoms: Impaired concentration, Loss of interest  Anxiety Symptoms: Generalized  Shani Symptoms: No problems reported or observed.   Hallucinations: None  Delusions: No  Delusions: Paranoid  Memory:Normal: Yes  Memory: Poor recent  Insight and Judgment: No  Insight and Judgment: Other (comment) (fair insight/judgment)    Tobacco Screening:  Practical Counseling, on admission, ross X, if applicable and completed (first 3 are required if patient doesn't refuse):            ( ) Recognizing danger situations (included triggers and roadblocks)                    ( ) Coping skills (new ways to manage stress,relaxation techniques, changing routine, distraction)                                                           ( ) Basic
Assumed care of pt at 1900. Pt observed resting quietly in bed. Pleasant upon approach. Calm and cooperative with assessment. Presents flat and blunted. Currently denies suicidal and homicidal ideation, as well as s/s of psychosis. Appears to be thought blocking. Isolative to bed. Complaint with scheduled night time medications. No complaints at this time. Q15 min safety maintained.
BEHAVIORAL HEALTH FOLLOW-UP NOTE     3/26/2023     Patient was seen and examined in person, Chart reviewed   Patient's case discussed with staff/team    Chief Complaint: \" I am having trouble sleeping and cannot have my trazodone increased\"    Interim History:   I saw patient this morning on the unit. Staff indicates he had a period of agitation and anxiety yesterday we did increase his Seroquel to 50 mg daily he states he feels better with that dose of medication. He states he feels better than yesterday a bright pleasant affect vehemently denies suicidal homicidal ideations intent or plan denies any auditory or visual hallucinations eating well sleeping well he is asking about discharge planning.   No overt or covert signs psychosis no behavioral disturbances noted        Appetite:   [x] Normal/Unchanged  [] Increased  [] Decreased      Sleep:       [x] Normal/Unchanged  [] Fair       [] Poor              Energy:    [x] Normal/Unchanged  [] Increased  [] Decreased        SI [] Present  [x] Absent    HI  []Present  [x] Absent     Aggression:  [] yes  [x] no    Patient is [x] able  [] unable to CONTRACT FOR SAFETY     PAST MEDICAL/PSYCHIATRIC HISTORY:   Past Medical History:   Diagnosis Date    Anxiety     Arthritis     Hyperlipidemia     Hypertension     Impulse control disease     Schizo affective schizophrenia ([facility] [address]        FAMILY/SOCIAL HISTORY:  Family History   Problem Relation Age of Onset    No Known Problems Mother     No Known Problems Sister      Social History     Socioeconomic History    Marital status: Single     Spouse name: Not on file    Number of children: Not on file    Years of education: Not on file    Highest education level: Not on file   Occupational History     Employer: NOT EMPLOYED   Tobacco Use    Smoking status: Every Day     Packs/day: 1.50     Years: 9.00     Pack years: 13.50     Types: Cigarettes     Start date: 2/18/2000    Smokeless tobacco: Never   Vaping Use    Vaping Use: Never
CLINICAL PHARMACY NOTE: MEDS TO BEDS    Total # of Prescriptions Filled: 4   The following medications were delivered to the patient:  Trazodone 150mg  Quetiapine 50mg  Quetiapine 100mg  Sertraline 50mg    Additional Documentation:  Delivered to [facility]
Leisure assessment completed.
Patient MENDOZA ordered for tomorrow
Patient attended community meeting   Was updated on expectations of the unit, staffing, and programming  Patient shared goal for today as \"see the doctor\"  Patient was 1 of 9 in attendance.
Patient attended community meeting   Was updated on expectations of the unit, staffing, and programming  Patient shared goal for today as take one day at a time  Patient was 1 of 7 in attendance.
Patient attended community meeting   Was updated on expectations of the unit, staffing, and programming  Patient shared goal for today as to talk to the . Patient was 1 of 12 in attendance.
Patient declined invitation to the following groups      Recreation Activity. Patient will continue to be provided with opportunities to enhance leisure skills/interests and/or coping mechanisms.
Patient declined invitation to the following groups    Community Meeting  Patient provided CTRS with goal of \"attend all groups\"      Patient will continue to be provided with opportunities to enhance leisure skills/interests and/or coping mechanisms.
Patient declined invitation to the following groups    Recreation Activity. Patient will continue to be provided with opportunities to enhance leisure skills/interests and/or coping mechanisms.
Patient denies SI/HI/Hallucinations, anxiety or depression. Patient appears restless, observed out on the unit in the day room. Patient social with select peers. Patient has not voiced any paranoid delusions so far this shift. Patient taking prescribed medications, eating provided meals, and attending select groups.
Patient denies SI/HI/Hallucinations, anxiety or depression. Patient pleasant and bright during conversation, joking with this RN. Patient observed out on the unit watching television and socializing with peers. Patient taking prescribed medications, eating provided meals, and attending groups without issue.
Patient denies SI/HI/Hallucinations. Patient less irritable and agitated today. Patient pleasant and bright during conversation. Patient observed out on the unit watching television and socializing with select peers. Patient remains focused on his housing situation and moving into another apartment. Patient taking prescribed medications, eating provided meals, and attending groups.
Patient denies suicidal ideations, homicidal ideations, and hallucinations. Patient is future and discharge focused. Brightens upon conversation. He is medication compliant and is in control of his behavior.
Patient presents calm and cooperative during assessment. Patient denies SI/HI/AVH. Patient reports 4/10 anxiety, denies depression and pain. No paranoia or delusions reported or observed. Patient is visible on the unit and social with peers. Medications taken without issues. PRN Vistaril and Melatonin given. No other c/o or concerns verbalized at this time. No unit problems reported. Q 15 minute purposeful rounds maintained. Will continue to observe and report.
Patient resting quietly in bed with eyes closed. Respirations even and unlabored with no signs of distress observed. Environmental rounds continued.
Patient resting quietly with eyes closed. No S/S of distress noted or observed. No prn medications given at this time. Will continue to monitor, provide needs and safe environment with continue rounding every 15 minutes.
Patient was out on unit social with peers. Alert and oriented. Pleasant and brightens with conversation. Denies SI<HI or AV hallucinations. Compliant with medications  Attended groups. Patient will continue to work toward discharge goals by including medications compliance and group attendance. Staff will continue to offer individual support & interventions per request and as required.
Patient was out on unit,social with peers. Alert and oriented. Has slight tremors. Denies SI,HI or AV hallucinations. Denies anxiety or depression. Verbalizes appetite is good. Sleeping good. Compliant with medications. Attended group. Patient will continue to work toward discharge goals by including medications compliance and group attendance. Staff will continue to offer individual support & interventions per request and as required.
Patient was out on unit,social with peers. Alert and oriented. Pleasant,cooperative. Denies suicidal,homicidal or AV hallucinations. Verbalizes depressed and angry because his legal guardian does not know anything about the law. \" Had to actually show him and explain my lease. I want to move from [facility] residence to another. But it has to be the same payment method and there are 3 in [facility]. Encourage patient to speak with social work. Which patient said he did and she would not hear him out. So is angry. Told him to call patient advocate and explain or talk with  supervisor Monday. Verbalizes appetite is good,as is his sleep,  Medication compliant. Patient will continue to work toward discharge goals by including medications compliance and group attendance. Staff will continue to offer individual support & interventions per request and as required.
Per [employer] approved formulary policy. SGLT2's are only formulary with the indication of CKD or CHF therefore:    Please note that the  Empagliflozin Cassandrabrooklyn Goel) is non-formulary with indications of type 2 diabetes and has been discontinued while inpatient. If you feel the patient needs to continue their home therapy during the inpatient stay, the patient may bring their medication bottle for verification and administration pursuant to our home medication use policy. Please contact the pharmacy with any questions or concerns. Thank you.   Denzel Moeller [id number], [facility], [facility]/PharmD 3/23/2023 10:16 AM
Pt attended afternoon smoking cessation group. Pt appeared to be an active listener. Pt is able to share appropriately when prompted and asked facilitator relevant questions. Pt was participant 1 of 1.     Electronically signed by Andrea Agrawal on 3/24/2023 at 9:49 AM
Pt attended community meeting  He identified his goal \"to work on my thoughts\"
Pt resting in bed with eyes closed. Respirations even and unlabored. No signs of distress noted. Q15 mins safety rounds maintained.
Pt resting in bed with eyes closed. Respirations even and unlabored. No signs of distress noted. Q15 mins safety rounds maintained.
This nurse was coming up humphries and patient motions for me to come to room. Went to room,patient points at window asking if I see finger/hand prints on window at head of his bed. Goes on to state that those are that same that are on his walls at his apartment. Asking patient if he knows whom it is? Patient states that her name is Claire Colmenares [facility],but she somehow got a copy made of keys and she was in his apartment. He said she does more that watch over the residents she has romantic relationships with them also. Patient will continue to work toward discharge goals by including medications compliance and group attendance. Staff will continue to offer individual support & interventions per request and as required.
Smoking status: Every Day     Packs/day: 1.50     Years: 9.00     Pack years: 13.50     Types: Cigarettes     Start date: 2/18/2000    Smokeless tobacco: Never   Vaping Use    Vaping Use: Never used   Substance and Sexual Activity    Alcohol use: Not Currently    Drug use: Not Currently    Sexual activity: Not Currently   Other Topics Concern    Not on file   Social History Narrative    Not on file     Social Determinants of Health     Financial Resource Strain: Not on file   Food Insecurity: Not on file   Transportation Needs: Not on file   Physical Activity: Not on file   Stress: Not on file   Social Connections: Not on file   Intimate Partner Violence: Not on file   Housing Stability: Not on file           ROS:  [x] All negative/unchanged except if checked.  Explain positive(checked items) below:  [] Constitutional  [] Eyes  [] Ear/Nose/Mouth/Throat  [] Respiratory  [] CV  [] GI  []   [] Musculoskeletal  [] Skin/Breast  [] Neurological  [] Endocrine  [] Heme/Lymph  [] Allergic/Immunologic    Explanation:     MEDICATIONS:    Current Facility-Administered Medications:     traZODone (DESYREL) tablet 75 mg, 75 mg, Oral, Nightly PRN, GRISELDA Cope CNP, 75 mg at 03/26/23 2310    QUEtiapine (SEROQUEL) tablet 50 mg, 50 mg, Oral, Daily, GRISELDA Tapia - CNP, 50 mg at 03/27/23 5815    metFORMIN (GLUCOPHAGE) tablet 1,000 mg, 1,000 mg, Oral, BID WC, GRISELDA Siddiqi CNP, 1,000 mg at 03/27/23 1940    cetirizine (ZYRTEC) tablet 10 mg, 10 mg, Oral, Daily, GRISELDA Siddiqi CNP, 10 mg at 03/27/23 0910    lisinopril (PRINIVIL;ZESTRIL) tablet 20 mg, 20 mg, Oral, Daily, GRISELDA Siddiqi CNP, 20 mg at 03/27/23 0905    montelukast (SINGULAIR) tablet 10 mg, 10 mg, Oral, Nightly, GRISELDA Siddiqi CNP, 10 mg at 03/26/23 2255    pantoprazole (PROTONIX) tablet 40 mg, 40 mg, Oral, QAM AC, GRISELDA Siddiqi CNP, 40 mg at 03/27/23 0659    propranolol (INDERAL LA) extended release capsule 60 mg, 60
trospium (SANCTURA) tablet 20 mg, 20 mg, Oral, BID AC, GRISELDA Dobbs - CNP, 20 mg at 03/24/23 8153    gabapentin (NEURONTIN) capsule 100 mg, 100 mg, Oral, BID, GRISELDA Dobbs - CNP, 100 mg at 03/24/23 0857    fluticasone (FLONASE) 50 MCG/ACT nasal spray 1 spray, 1 spray, Each Nostril, Daily, GRISELDA Dobbs - CNP, 1 spray at 03/24/23 8574    Vitamin D (CHOLECALCIFEROL) tablet 5,000 Units, 5,000 Units, Oral, Daily, GRISELDA Dobbs - CNP, 5,000 Units at 03/24/23 0857    atorvastatin (LIPITOR) tablet 20 mg, 20 mg, Oral, Daily, GRISELDA Dobbs - CNP, 20 mg at 03/24/23 0857    albuterol (PROVENTIL) nebulizer solution 2.5 mg, 2.5 mg, Nebulization, Q6H PRN, GRISELDA Dobbs - CNP    divalproex (DEPAKOTE) DR tablet 500 mg, 500 mg, Oral, TID, GRISELDA Dobbs - CNP, 500 mg at 03/24/23 1333    QUEtiapine (SEROQUEL) tablet 100 mg, 100 mg, Oral, Nightly, GRISELDA Dobbs - CNP, 100 mg at 03/23/23 2056    sertraline (ZOLOFT) tablet 100 mg, 100 mg, Oral, Daily, GRISELDA Dobbs - CNP, 100 mg at 03/24/23 0856    [START ON 3/26/2023] sertraline (ZOLOFT) tablet 50 mg, 50 mg, Oral, Daily, GRISELDA Dobbs - CNP    tamsulosin (FLOMAX) capsule 0.8 mg, 0.8 mg, Oral, Nightly, GRISELDA Dobbs - CNP, 0.8 mg at 03/23/23 2056    acetaminophen (TYLENOL) tablet 650 mg, 650 mg, Oral, Q4H PRN, Gilmar Thayer MD, 650 mg at 03/24/23 0957    hydrOXYzine pamoate (VISTARIL) capsule 50 mg, 50 mg, Oral, TID PRN, Gilmar Thayer MD    haloperidol (HALDOL) tablet 5 mg, 5 mg, Oral, Q4H PRN **OR** haloperidol lactate (HALDOL) injection 5 mg, 5 mg, IntraMUSCular, Q4H PRN, Gilmar Thayer MD    benztropine mesylate (COGENTIN) injection 2 mg, 2 mg, IntraMUSCular, BID PRN, Gilmar Thayer MD    traZODone (DESYREL) tablet 50 mg, 50 mg, Oral, Nightly PRN, Gilmar Thayer MD, 50 mg at 03/23/23 2056    magnesium hydroxide (MILK OF MAGNESIA) 400 MG/5ML suspension 30 mL, 30 mL, Oral, Daily PRN, Feliciano Gutierrez
LITHIUM 0.99 09/15/2011     Lab Results   Component Value Date    VALPROATE 46 (L) 03/22/2023       Treatment Plan:  The patient's diagnosis, treatment plan, medication management were formulated after patient was seen directly by the attending physician and myself and all relevant documentation was reviewed. Risk, benefit, side effects, possible outcomes of the medication and alternatives discussed with the patient and the patient demonstrated understanding. The patient was also educated that the outcome of treatment will depend on the medication compliance as directed by the prescribers along with regular follow-up, compliance with the labs and other work-up, as clinically indicated. New Medications started during this admission :    Decrease Seroquel to 100 mg at at bedtime  Decrease Zoloft to 100 mg then to 50 mg  Cogentin 0.5 mg twice daily due to tremor  Continue Aristada 882 mg will need to verify date of last injection given  Discontinue mirtazapine    Collateral information: Followed by social work  CD evaluation  Encourage patient to attend group and other milieu activities. Discharge planning discussed with the patient and treatment team.    PSYCHOTHERAPY/COUNSELING:  [x] Therapeutic interview  [x] Supportive  [] CBT  [] Ongoing  [] Other    [x] Patient continues to need, on a daily basis, active treatment furnished directly by or requiring the supervision of inpatient psychiatric personnel      Anticipated Length of stay: 5 to 7 days based on stability    NOTE: This report was transcribed using voice recognition software. Every effort was made to ensure accuracy; however, inadvertent computerized transcription errors may be present.        Electronically signed by GRISELDA Fisher CNP on 7/48/3362 at 12:49 PM

## 2023-03-28 NOTE — PLAN OF CARE
Patient denies SI/HI/Hallucinations. Patient has been in control of his behaviors. Medication compliant. Patient seen on unit intermittently. No active distress noted respirations   Problem: ABCDS Injury Assessment  Goal: Absence of physical injury  Outcome: Progressing     Problem: Anxiety  Goal: Will report anxiety at manageable levels  Description: INTERVENTIONS:  1. Administer medication as ordered  2. Teach and rehearse alternative coping skills  3. Provide emotional support with 1:1 interaction with staff  Outcome: Progressing     Problem: Decision Making  Goal: Pt/Family able to effectively weigh alternatives and participate in decision making related to treatment and care  Description: INTERVENTIONS:  1. Determine when there are differences between patient's view, family's view, and healthcare provider's view of condition  2. Facilitate patient and family articulation of goals for care  3. Help patient and family identify pros/cons of alternative solutions  4. Provide information as requested by patient/family  5. Respect patient/family right to receive or not to receive information  6. Serve as a liaison between patient and family and health care team  7.  Initiate Consults from Ethics, Palliative Care or initiate [address] as is appropriate  Outcome: Progressing
Problem: ABCDS Injury Assessment  Goal: Absence of physical injury  Outcome: Progressing     Problem: Anxiety  Goal: Will report anxiety at manageable levels  Description: INTERVENTIONS:  1. Administer medication as ordered  2. Teach and rehearse alternative coping skills  3. Provide emotional support with 1:1 interaction with staff  Outcome: Progressing     Problem: Coping  Goal: Pt/Family able to verbalize concerns and demonstrate effective coping strategies  Description: INTERVENTIONS:  1. Assist patient/family to identify coping skills, available support systems and cultural and spiritual values  2. Provide emotional support, including active listening and acknowledgement of concerns of patient and caregivers  3. Reduce environmental stimuli, as able  4. Instruct patient/family in relaxation techniques, as appropriate  5. Assess for spiritual pain/suffering and initiate Spiritual Care, Psychosocial Clinical Specialist consults as needed  Outcome: Progressing     Problem: Decision Making  Goal: Pt/Family able to effectively weigh alternatives and participate in decision making related to treatment and care  Description: INTERVENTIONS:  1. Determine when there are differences between patient's view, family's view, and healthcare provider's view of condition  2. Facilitate patient and family articulation of goals for care  3. Help patient and family identify pros/cons of alternative solutions  4. Provide information as requested by patient/family  5. Respect patient/family right to receive or not to receive information  6. Serve as a liaison between patient and family and health care team  7.  Initiate Consults from Ethics, Palliative Care or initiate [address] [place] [address] as is appropriate  Outcome: Progressing
Problem: Anxiety  Goal: Will report anxiety at manageable levels  Description: INTERVENTIONS:  1. Administer medication as ordered  2. Teach and rehearse alternative coping skills  3. Provide emotional support with 1:1 interaction with staff  Outcome: Progressing     Problem: Confusion  Goal: Confusion, delirium, dementia, or psychosis is improved or at baseline  Description: INTERVENTIONS:  1. Assess for possible contributors to thought disturbance, including medications, impaired vision or hearing, underlying metabolic abnormalities, dehydration, psychiatric diagnoses, and notify attending LIP  2. [place] high risk fall precautions, as indicated  3. Provide frequent short contacts to provide reality reorientation, refocusing and direction  4. Decrease environmental stimuli, including noise as appropriate  5. Monitor and intervene to maintain adequate nutrition, hydration, elimination, sleep and activity  6. If unable to ensure safety without constant attention obtain sitter and review sitter guidelines with assigned personnel  7. Initiate Psychosocial CNS and Spiritual Care consult, as indicated  Outcome: Progressing     Problem: Behavior  Goal: Pt/Family maintain appropriate behavior and adhere to behavioral management agreement, if implemented  Description: INTERVENTIONS:  1. Assess patient/family's coping skills and  non-compliant behavior (including use of illegal substances)  2. Notify security of behavior or suspected illegal substances which indicate the need for search of the family and/or belongings  3. Encourage verbalization of thoughts and concerns in a socially appropriate manner  4. Utilize positive, consistent limit setting strategies supporting safety of patient, staff and others  5. Encourage participation in the decision making process about the behavioral management agreement  6. If a visitor's behavior poses a threat to safety call refer to organization policy.   7. Initiate consult with
Problem: Risk for Elopement  Goal: Patient will not exit the unit/facility without proper excort  3/25/2023 1228 by Hao Yancey RN  Outcome: Progressing  3/25/2023 0549 by Berkley Alan RN  Outcome: Progressing     Problem: ABCDS Injury Assessment  Goal: Absence of physical injury  3/25/2023 1228 by Hao Yancey RN  Outcome: Progressing  3/25/2023 0549 by Berkley Alan RN  Outcome: Progressing     Problem: Anxiety  Goal: Will report anxiety at manageable levels  Description: INTERVENTIONS:  1. Administer medication as ordered  2. Teach and rehearse alternative coping skills  3. Provide emotional support with 1:1 interaction with staff  3/25/2023 1228 by Hao Yancey RN  Outcome: Progressing  3/25/2023 0549 by Berkley Alan RN  Outcome: Progressing     Problem: Behavior  Goal: Pt/Family maintain appropriate behavior and adhere to behavioral management agreement, if implemented  Description: INTERVENTIONS:  1. Assess patient/family's coping skills and  non-compliant behavior (including use of illegal substances)  2. Notify security of behavior or suspected illegal substances which indicate the need for search of the family and/or belongings  3. Encourage verbalization of thoughts and concerns in a socially appropriate manner  4. Utilize positive, consistent limit setting strategies supporting safety of patient, staff and others  5. Encourage participation in the decision making process about the behavioral management agreement  6. If a visitor's behavior poses a threat to safety call refer to organization policy.   7. Initiate consult with , Psychosocial CNS, Spiritual Care as appropriate  3/25/2023 1228 by Hao Yancey RN  Outcome: Progressing  3/25/2023 0549 by Berkley Alan RN  Outcome: Progressing     Problem: Involuntary Admit  Goal: Will cooperate with staff recommendations and doctor's orders and will demonstrate appropriate behavior  Description:
Problem: Risk for Elopement  Goal: Patient will not exit the unit/facility without proper excort  3/26/2023 1004 by Betina Rodriguez RN  Outcome: Progressing  3/26/2023 0124 by King Teresa RN  Outcome: Progressing     Problem: ABCDS Injury Assessment  Goal: Absence of physical injury  3/26/2023 1004 by Betina Rodriguez RN  Outcome: Progressing  3/26/2023 0124 by King Teresa RN  Outcome: Progressing     Problem: Anxiety  Goal: Will report anxiety at manageable levels  Description: INTERVENTIONS:  1. Administer medication as ordered  2. Teach and rehearse alternative coping skills  3. Provide emotional support with 1:1 interaction with staff  3/26/2023 1004 by Betina Rodriguez RN  Outcome: Progressing  3/26/2023 0124 by King Teresa RN  Outcome: Progressing     Problem: Coping  Goal: Pt/Family able to verbalize concerns and demonstrate effective coping strategies  Description: INTERVENTIONS:  1. Assist patient/family to identify coping skills, available support systems and cultural and spiritual values  2. Provide emotional support, including active listening and acknowledgement of concerns of patient and caregivers  3. Reduce environmental stimuli, as able  4. Instruct patient/family in relaxation techniques, as appropriate  5. Assess for spiritual pain/suffering and initiate Spiritual Care, Psychosocial Clinical Specialist consults as needed  3/26/2023 1004 by Betina Rodriguez RN  Outcome: Progressing  3/26/2023 0124 by King Teresa RN  Outcome: Progressing     Problem: Behavior  Goal: Pt/Family maintain appropriate behavior and adhere to behavioral management agreement, if implemented  Description: INTERVENTIONS:  1. Assess patient/family's coping skills and  non-compliant behavior (including use of illegal substances)  2. Notify security of behavior or suspected illegal substances which indicate the need for search of the family and/or belongings  3.  Encourage verbalization of thoughts and
Problem: Risk for Elopement  Goal: Patient will not exit the unit/facility without proper excort  Outcome: Progressing   Patient denies SI/HI/Hallucinations. Patient has been in control of his behaviors and medication compliant. Rating anxiety and depression a 4/10. No active distress noted. Respirations even and unlabored. Will continue to monitor and will intervene as needed. Problem: ABCDS Injury Assessment  Goal: Absence of physical injury  Outcome: Progressing     Problem: Anxiety  Goal: Will report anxiety at manageable levels  Description: INTERVENTIONS:  1. Administer medication as ordered  2. Teach and rehearse alternative coping skills  3. Provide emotional support with 1:1 interaction with staff  Outcome: Progressing     Problem: Coping  Goal: Pt/Family able to verbalize concerns and demonstrate effective coping strategies  Description: INTERVENTIONS:  1. Assist patient/family to identify coping skills, available support systems and cultural and spiritual values  2. Provide emotional support, including active listening and acknowledgement of concerns of patient and caregivers  3. Reduce environmental stimuli, as able  4. Instruct patient/family in relaxation techniques, as appropriate  5.  Assess for spiritual pain/suffering and initiate Spiritual Care, Psychosocial Clinical Specialist consults as needed  Outcome: Progressing
Progressing     Problem: Confusion  Goal: Confusion, delirium, dementia, or psychosis is improved or at baseline  Description: INTERVENTIONS:  1. Assess for possible contributors to thought disturbance, including medications, impaired vision or hearing, underlying metabolic abnormalities, dehydration, psychiatric diagnoses, and notify attending LIP  2. [place] high risk fall precautions, as indicated  3. Provide frequent short contacts to provide reality reorientation, refocusing and direction  4. Decrease environmental stimuli, including noise as appropriate  5. Monitor and intervene to maintain adequate nutrition, hydration, elimination, sleep and activity  6. If unable to ensure safety without constant attention obtain sitter and review sitter guidelines with assigned personnel  7.  Initiate Psychosocial CNS and Spiritual Care consult, as indicated  Outcome: Progressing
Progressing  3/27/2023 0137 by Alejo Duncan RN  Outcome: Progressing     Problem: Safety - Adult  Goal: Free from fall injury  3/27/2023 1046 by Adam Vizcaino RN  Outcome: Progressing  3/27/2023 0137 by Alejo Duncan RN  Outcome: Progressing
medications, impaired vision or hearing, underlying metabolic abnormalities, dehydration, psychiatric diagnoses, and notify attending LIP  2. [place] high risk fall precautions, as indicated  3. Provide frequent short contacts to provide reality reorientation, refocusing and direction  4. Decrease environmental stimuli, including noise as appropriate  5. Monitor and intervene to maintain adequate nutrition, hydration, elimination, sleep and activity  6. If unable to ensure safety without constant attention obtain sitter and review sitter guidelines with assigned personnel  7.  Initiate Psychosocial CNS and Spiritual Care consult, as indicated  Outcome: Progressing
not to receive information  6. Serve as a liaison between patient and family and health care team  7.  Initiate Consults from Ethics, Palliative Care or initiate [address] [facility] as is appropriate  Outcome: Progressing
psychiatric diagnoses, and notify attending LIP  2. [place] high risk fall precautions, as indicated  3. Provide frequent short contacts to provide reality reorientation, refocusing and direction  4. Decrease environmental stimuli, including noise as appropriate  5. Monitor and intervene to maintain adequate nutrition, hydration, elimination, sleep and activity  6. If unable to ensure safety without constant attention obtain sitter and review sitter guidelines with assigned personnel  7. Initiate Psychosocial CNS and Spiritual Care consult, as indicated  Outcome: Progressing     Problem: Behavior  Goal: Pt/Family maintain appropriate behavior and adhere to behavioral management agreement, if implemented  Description: INTERVENTIONS:  1. Assess patient/family's coping skills and  non-compliant behavior (including use of illegal substances)  2. Notify security of behavior or suspected illegal substances which indicate the need for search of the family and/or belongings  3. Encourage verbalization of thoughts and concerns in a socially appropriate manner  4. Utilize positive, consistent limit setting strategies supporting safety of patient, staff and others  5. Encourage participation in the decision making process about the behavioral management agreement  6. If a visitor's behavior poses a threat to safety call refer to organization policy. 7. Initiate consult with , Psychosocial CNS, Spiritual Care as appropriate  Outcome: Progressing     Problem: Involuntary Admit  Goal: Will cooperate with staff recommendations and doctor's orders and will demonstrate appropriate behavior  Description: INTERVENTIONS:  1. Treat underlying conditions and offer medication as ordered  2. Educate regarding involuntary admission procedures and rules  3.  Contain excessive/inappropriate behavior per unit and hospital policies  Outcome: Progressing     Problem: Safety - Adult  Goal: Free from fall injury  Outcome:

## 2023-03-28 NOTE — DISCHARGE SUMMARY
on file   Intimate Partner Violence: Not on file   Housing Stability: Not on file       MEDICATIONS:    Current Facility-Administered Medications:     melatonin tablet 6 mg, 6 mg, Oral, Nightly PRN, Chanetta Heimlich, APRN - CNP, 6 mg at 03/27/23 2032    ARIPiprazole lauroxil (ARISTADA) injection 882 mg, 882 mg, IntraMUSCular, Q28 Days, Chanetta Heimlich, APRN - CNP    traZODone (DESYREL) tablet 75 mg, 75 mg, Oral, Nightly PRN, GRISELDA Ryan - CNP, 75 mg at 03/26/23 2310    QUEtiapine (SEROQUEL) tablet 50 mg, 50 mg, Oral, Daily, GRISELDA Ryan - CNP, 50 mg at 03/28/23 0932    metFORMIN (GLUCOPHAGE) tablet 1,000 mg, 1,000 mg, Oral, BID WC, Chanetta Heimlich, APRN - CNP, 1,000 mg at 03/28/23 0934    cetirizine (ZYRTEC) tablet 10 mg, 10 mg, Oral, Daily, Chanetta Heimlich, APRN - CNP, 10 mg at 03/28/23 0935    lisinopril (PRINIVIL;ZESTRIL) tablet 20 mg, 20 mg, Oral, Daily, Chanetta Heimlich, APRN - CNP, 20 mg at 03/28/23 0934    montelukast (SINGULAIR) tablet 10 mg, 10 mg, Oral, Nightly, Chanetta Heimlich, APRN - CNP, 10 mg at 03/27/23 2031    pantoprazole (PROTONIX) tablet 40 mg, 40 mg, Oral, QAM AC, Chanetta Heimlich, APRN - CNP, 40 mg at 03/28/23 0641    propranolol (INDERAL LA) extended release capsule 60 mg, 60 mg, Oral, Daily, Chanetta Heimlich, APRN - CNP, 60 mg at 03/28/23 0932    gabapentin (NEURONTIN) capsule 100 mg, 100 mg, Oral, BID, Chanetta Heimlich, APRN - CNP, 100 mg at 03/28/23 0931    fluticasone (FLONASE) 50 MCG/ACT nasal spray 1 spray, 1 spray, Each Nostril, Daily, Chanetta Heimlich, APRN - CNP, 1 spray at 03/28/23 7631    Vitamin D (CHOLECALCIFEROL) tablet 5,000 Units, 5,000 Units, Oral, Daily, Chanetta Heimlich, APRN - CNP, 5,000 Units at 03/28/23 3313    atorvastatin (LIPITOR) tablet 20 mg, 20 mg, Oral, Daily, Chanetta Heimlich, APRN - CNP, 20 mg at 03/28/23 0931    albuterol (PROVENTIL) nebulizer solution 2.5 mg, 2.5 mg, Nebulization, Q6H PRN, Chanetta Heimlich, APRN - CNP    divalproex (DEPAKOTE) DR tablet 500 mg, 500

## 2023-03-28 NOTE — GROUP NOTE
Group Therapy Note    Date: 3/28/2023  Start Time: 1100  End Time:  1140  Number of Participants: 11    Type of Group: Psychoeducation    Wellness Binder Information  Module Name:  Shakir Kirby on Let go    Patient's Goal:  ID toxic influences and people in one's life. To increase self-awareness of what one needs to hold onto and let go. Notes:  CTRS discussed eliminating toxic influences and people. Patient shared about what they need to hold onto and let go of to be well. Status After Intervention:  Improved    Participation Level:  Active Listener and Interactive    Participation Quality: Appropriate, Attentive, and Sharing      Speech:  normal      Thought Process/Content: Logical      Affective Functioning: Congruent      Mood: calm, cooperative      Level of consciousness:  Alert and Attentive      Response to Learning: Able to verbalize current knowledge/experience, Able to verbalize/acknowledge new learning, and Able to retain information      Endings: None Reported    Modes of Intervention: Education and Exploration      Discipline Responsible: Psychoeducational Specialist      Signature:  Hilario Garcia, [address]

## 2023-03-28 NOTE — CARE COORDINATION
KIARA attempted to call pt's Legal Guardian Kirt San [phone] to discuss discharge planning. The call is not going through and message stated to try again later. KIARA will continue to attempt to reach guardian. KIARA contacted [facility] [phone] to schedule follow up appointments. KIARA was informed that the provider will be out of the office for a month and they are working to determine who will be filling in for the provider. Andrés Casey will call KIARA back with more information.
KIARA called [facility] [phone] to schedule transportation for the pt to return home.  KIARA was informed that the ETA will be around 5 PM, Trip Number:[id number]    Pt's address is: [address] [place] [id number]
Pt denied invitation to cognitive skills group.
Pt requested to talk with SW. Pt wanted to know what his discharge plan is. Pt stated he talked with the NP this morning and was advised he will receive his injections here tomorrow and then he possibly will be discharged on Wednesday. SW advised pt that SW will not know his for sure discharge date until the morning of. Pt plans to return home to [facility] at time of discharge. Pt will continue to treat with [facility] at time of discharge. Pt is aware that he will be scheduled with one of the providers in the [place] office at time of discharge due to his provided being out of office for one month. Pt stated he is happy about this because he feels he needs a new mental health provider. Pt cooperative, clam, pleasant, with good eye contact, clear speech, improving insight/judgement. SW contacted [facility] [phone] and cancelled his follow up appointment for 3/28. They are aware that pt will be receiving his MENDOZA here tomorrow. KIARA left a voicemail for their supervisor to discuss scheduling pt next appointment as he will have to be scheduled at the [place] office. KIARA was advised their supervisor will be back tomorrow and she will be able to schedule this appointment.
SW contacted [facility] [phone] and was advised pt has an appointment already scheduled on 3/28 for his next injections. Their NP Odin Pace will be out of the office for the next month. If an additional appointment is required, pt will need to see one of the providers at the [place] location.
Sw was informed pt was upset about not talking to sw about d/c plans. Pt never asked sw to speak about d/c plans so sw went to pt about this misunderstanding. Pt apologized for being upset last night and stated \" I thought I asked to talk to you\" SW sat and listened to pt concerns regarding d/c. Pt discussed having a voucher for [employer] and that he would like to possibly be moved from the [address] [place] [address]. Pt Discussed that his guardian is not working well for him. Sw actively listened and assured pt that a d/c plan would be worked out before pt discharging but that sw is not able to contact anyone today due to it being Sunday.  Pt was calm and pleasant, again apologized and stated he will probably \"be here until Tuesday when I get my shot\"
placed on a cancellation list and SW was informed that this is the earliest appointment they have available.      In order to ensure appropriate transition and discharge planning is in place, the following documents have been transmitted to [facility], as the new outpatient provider:    The d/c diagnosis was transmitted to the next care provider  The reason for hospitalization was transmitted to the next care provider  The d/c medications (dosage and indication) were transmitted to the next care provider   The continuing care plan was transmitted to the next care provider
(Specify):     Follow up Provider: [facility]

## 2023-10-24 ENCOUNTER — HOSPITAL ENCOUNTER (EMERGENCY)
Age: 53
Discharge: HOME | End: 2023-10-24
Attending: EMERGENCY MEDICINE
Payer: MEDICAID

## 2023-10-24 VITALS
SYSTOLIC BLOOD PRESSURE: 112 MMHG | BODY MASS INDEX: 34.94 KG/M2 | DIASTOLIC BLOOD PRESSURE: 79 MMHG | OXYGEN SATURATION: 93 % | RESPIRATION RATE: 18 BRPM | WEIGHT: 257.94 LBS | TEMPERATURE: 98.4 F | HEART RATE: 77 BPM | HEIGHT: 72 IN

## 2023-10-24 DIAGNOSIS — F25.9 SCHIZOAFFECTIVE DISORDER, UNSPECIFIED TYPE (HCC): ICD-10-CM

## 2023-10-24 DIAGNOSIS — F39 MOOD DISORDER (HCC): Primary | ICD-10-CM

## 2023-10-24 LAB
ALBUMIN SERPL-MCNC: 4 G/DL (ref 3.5–5.2)
ALP SERPL-CCNC: 68 U/L (ref 40–129)
ALT SERPL-CCNC: 21 U/L (ref 0–40)
AMPHET UR QL SCN: NEGATIVE
ANION GAP SERPL CALCULATED.3IONS-SCNC: 12 MMOL/L (ref 7–16)
APAP SERPL-MCNC: <5 UG/ML (ref 10–30)
AST SERPL-CCNC: 13 U/L (ref 0–39)
BARBITURATES UR QL SCN: NEGATIVE
BASOPHILS # BLD: 0.05 K/UL (ref 0–0.2)
BASOPHILS NFR BLD: 1 % (ref 0–2)
BENZODIAZ UR QL: NEGATIVE
BILIRUB SERPL-MCNC: 0.5 MG/DL (ref 0–1.2)
BILIRUB UR QL STRIP: NEGATIVE
BUN SERPL-MCNC: 11 MG/DL (ref 6–20)
BUPRENORPHINE UR QL: NEGATIVE
CALCIUM SERPL-MCNC: 9.7 MG/DL (ref 8.6–10.2)
CANNABINOIDS UR QL SCN: NEGATIVE
CHLORIDE SERPL-SCNC: 100 MMOL/L (ref 98–107)
CLARITY UR: CLEAR
CO2 SERPL-SCNC: 26 MMOL/L (ref 22–29)
COCAINE UR QL SCN: NEGATIVE
COLOR UR: YELLOW
COMMENT: ABNORMAL
CREAT SERPL-MCNC: 0.6 MG/DL (ref 0.7–1.2)
DATE LAST DOSE: ABNORMAL
EKG ATRIAL RATE: 73 BPM
EKG P AXIS: 65 DEGREES
EKG P-R INTERVAL: 170 MS
EKG Q-T INTERVAL: 404 MS
EKG QRS DURATION: 98 MS
EKG QTC CALCULATION (BAZETT): 445 MS
EKG R AXIS: 42 DEGREES
EKG T AXIS: 50 DEGREES
EKG VENTRICULAR RATE: 73 BPM
EOSINOPHIL # BLD: 0.2 K/UL (ref 0.05–0.5)
EOSINOPHILS RELATIVE PERCENT: 2 % (ref 0–6)
ERYTHROCYTE [DISTWIDTH] IN BLOOD BY AUTOMATED COUNT: 14.1 % (ref 11.5–15)
ETHANOLAMINE SERPL-MCNC: <10 MG/DL
FENTANYL UR QL: NEGATIVE
GFR SERPL CREATININE-BSD FRML MDRD: >60 ML/MIN/1.73M2
GLUCOSE SERPL-MCNC: 132 MG/DL (ref 74–99)
GLUCOSE UR STRIP-MCNC: >=1000 MG/DL
HCT VFR BLD AUTO: 42.4 % (ref 37–54)
HGB BLD-MCNC: 14 G/DL (ref 12.5–16.5)
HGB UR QL STRIP.AUTO: NEGATIVE
IMM GRANULOCYTES # BLD AUTO: 0.08 K/UL (ref 0–0.58)
IMM GRANULOCYTES NFR BLD: 1 % (ref 0–5)
KETONES UR STRIP-MCNC: NEGATIVE MG/DL
LEUKOCYTE ESTERASE UR QL STRIP: NEGATIVE
LYMPHOCYTES NFR BLD: 4.2 K/UL (ref 1.5–4)
LYMPHOCYTES RELATIVE PERCENT: 41 % (ref 20–42)
MCH RBC QN AUTO: 30.8 PG (ref 26–35)
MCHC RBC AUTO-ENTMCNC: 33 G/DL (ref 32–34.5)
MCV RBC AUTO: 93.2 FL (ref 80–99.9)
METHADONE UR QL: NEGATIVE
MONOCYTES NFR BLD: 1.02 K/UL (ref 0.1–0.95)
MONOCYTES NFR BLD: 10 % (ref 2–12)
NEUTROPHILS NFR BLD: 46 % (ref 43–80)
NEUTS SEG NFR BLD: 4.66 K/UL (ref 1.8–7.3)
NITRITE UR QL STRIP: NEGATIVE
OPIATES UR QL SCN: NEGATIVE
OXYCODONE UR QL SCN: NEGATIVE
PCP UR QL SCN: NEGATIVE
PH UR STRIP: 7 [PH] (ref 5–9)
PLATELET # BLD AUTO: 200 K/UL (ref 130–450)
PMV BLD AUTO: 11.1 FL (ref 7–12)
POTASSIUM SERPL-SCNC: 4.2 MMOL/L (ref 3.5–5)
PROT SERPL-MCNC: 6.7 G/DL (ref 6.4–8.3)
PROT UR STRIP-MCNC: NEGATIVE MG/DL
RBC # BLD AUTO: 4.55 M/UL (ref 3.8–5.8)
SALICYLATES SERPL-MCNC: <0.3 MG/DL (ref 0–30)
SODIUM SERPL-SCNC: 138 MMOL/L (ref 132–146)
SP GR UR STRIP: 1.01 (ref 1–1.03)
TEST INFORMATION: NORMAL
TME LAST DOSE: ABNORMAL H
TOXIC TRICYCLIC SC,BLOOD: NEGATIVE
UROBILINOGEN UR STRIP-ACNC: 0.2 EU/DL (ref 0–1)
VALPROATE SERPL-MCNC: 43 UG/ML (ref 50–100)
VANCOMYCIN DOSE: ABNORMAL MG
WBC OTHER # BLD: 10.2 K/UL (ref 4.5–11.5)

## 2023-10-24 PROCEDURE — 99284 EMERGENCY DEPT VISIT MOD MDM: CPT

## 2023-10-24 PROCEDURE — 85025 COMPLETE CBC W/AUTO DIFF WBC: CPT

## 2023-10-24 PROCEDURE — 80164 ASSAY DIPROPYLACETIC ACD TOT: CPT

## 2023-10-24 PROCEDURE — 80053 COMPREHEN METABOLIC PANEL: CPT

## 2023-10-24 PROCEDURE — 80307 DRUG TEST PRSMV CHEM ANLYZR: CPT

## 2023-10-24 PROCEDURE — G0480 DRUG TEST DEF 1-7 CLASSES: HCPCS

## 2023-10-24 PROCEDURE — 80143 DRUG ASSAY ACETAMINOPHEN: CPT

## 2023-10-24 PROCEDURE — 80179 DRUG ASSAY SALICYLATE: CPT

## 2023-10-24 PROCEDURE — 81003 URINALYSIS AUTO W/O SCOPE: CPT

## 2023-10-24 PROCEDURE — 93010 ELECTROCARDIOGRAM REPORT: CPT | Performed by: INTERNAL MEDICINE

## 2023-10-24 PROCEDURE — 93005 ELECTROCARDIOGRAM TRACING: CPT | Performed by: EMERGENCY MEDICINE

## 2023-10-24 NOTE — ED NOTES
Behavioral Health Crisis Assessment      Chief Complaint: \"I\"ve been feeling a little off lately, I wanted to check my Depakote level. \"     Mental Status Exam: Alert, oriented x4, mood stable, affect within normal range, eye contact fair, speech slow at times, normal in volume, pt appears to have some intellectual deficits, behavior is calm, cooperative, appropriate, no signs of agitation, thought form appears logical, concrete at times, thought content appears clear, denies A/V hallucinations, delusions, paranoia, none evident in interview, denies suicidal ideation intent or plan, denies homicidal ideation intent or plan. Legal Status:  [x] Voluntary:  [] Involuntary, Issued by:    Gender:  [] Male [] Female [] Transgender  [] Other    Sexual Orientation:  [x] Heterosexual [] Homosexual [] Bisexual [] Other    Brief Clinical Summary:Pt is a 46 yo male who presents to the ED from [place], [place] via ambulance. Pt is not on a pink slip. Pt reports he was no feeling himself earlier and he was worried that his Depakote level was low. Mental status stable, appears somewhat intellectually limited, denies suicidal ideation intent or plan, denies homicidal ideation intent or plan,  Dx: Schizoaffective Disorder, medications per record: Trazadone, Zoloft, Seroquel, Depakote, Aristada injection.  Has guardian through [employer]    Collateral Information: Has guardian through [employer]    Risk Factors:  Mental health dx: Schizoaffective Disorder  Poor communication skills    Protective Factors:  Has guardian through [id number] [address] and stable housing  Access to essential needs  Steady income  No access to weapons  Supportive family    Suicidal Ideations:  [] Reports:    [] Past [] Present   [x] Denies    Suicide Attempts:  [] Reports:   [x] Denies    C-SSRS Screening Completed by RN: Current Suicide Risk:  [] No Risk [] Low [] Moderate [] High    Homicidal Ideations:  [] Reports:   [] Past [] Present   [x] Denies

## 2023-10-25 NOTE — ED NOTES
Pt arrived via ambulance, has no transport home. Reports his parents are elderly, cannot pick him up. Lives in [place], [place] approximately 30 miles away. Transport was arranged through his [employer], confirmation number [id number], was to arrive by 21:00. Transport arrived at 20:50 but pt. did not read text message from , although he was shown how to do this by SW. Appears unfamiliar with retrieving texts. Transport left. SW attempting to arrange alternative transport as [address] [place] [address] says they cannot come back for next 24 hours as pt was listed as a no-show. Call to [facility] as pt has legal guardian through their guardianship program.  Report they will notify his guardian and call SW back.        GERSON Chirinos, [place]  10/24/23 2121    Call to [facility], guardian called [employer], pt picked up for transport home 22:20       GERSON Bunch, [place]  10/24/23 7452

## 2024-01-21 ENCOUNTER — APPOINTMENT (OUTPATIENT)
Dept: GENERAL RADIOLOGY | Age: 54
End: 2024-01-21
Payer: MEDICAID

## 2024-01-21 ENCOUNTER — HOSPITAL ENCOUNTER (EMERGENCY)
Age: 54
Discharge: ANOTHER HOSPITAL | End: 2024-01-22
Attending: STUDENT IN AN ORGANIZED HEALTH CARE EDUCATION/TRAINING PROGRAM
Payer: MEDICAID

## 2024-01-21 DIAGNOSIS — Z00.8 EVALUATION BY PSYCHIATRIC SERVICE REQUIRED: Primary | ICD-10-CM

## 2024-01-21 LAB
ALBUMIN SERPL-MCNC: 4.4 G/DL (ref 3.5–5.2)
ALP SERPL-CCNC: 71 U/L (ref 40–129)
ALT SERPL-CCNC: 40 U/L (ref 0–40)
AMPHET UR QL SCN: NEGATIVE
ANION GAP SERPL CALCULATED.3IONS-SCNC: 12 MMOL/L (ref 7–16)
APAP SERPL-MCNC: <5 UG/ML (ref 10–30)
AST SERPL-CCNC: 20 U/L (ref 0–39)
BARBITURATES UR QL SCN: NEGATIVE
BASOPHILS # BLD: 0.06 K/UL (ref 0–0.2)
BASOPHILS NFR BLD: 0 % (ref 0–2)
BENZODIAZ UR QL: NEGATIVE
BILIRUB SERPL-MCNC: 0.3 MG/DL (ref 0–1.2)
BUN SERPL-MCNC: 11 MG/DL (ref 6–20)
BUPRENORPHINE UR QL: NEGATIVE
CALCIUM SERPL-MCNC: 10.1 MG/DL (ref 8.6–10.2)
CANNABINOIDS UR QL SCN: NEGATIVE
CHLORIDE SERPL-SCNC: 98 MMOL/L (ref 98–107)
CO2 SERPL-SCNC: 27 MMOL/L (ref 22–29)
COCAINE UR QL SCN: NEGATIVE
CREAT SERPL-MCNC: 0.5 MG/DL (ref 0.7–1.2)
EOSINOPHIL # BLD: 0.1 K/UL (ref 0.05–0.5)
EOSINOPHILS RELATIVE PERCENT: 1 % (ref 0–6)
ERYTHROCYTE [DISTWIDTH] IN BLOOD BY AUTOMATED COUNT: 13.7 % (ref 11.5–15)
ETHANOLAMINE SERPL-MCNC: <10 MG/DL
FENTANYL UR QL: NEGATIVE
GFR SERPL CREATININE-BSD FRML MDRD: >60 ML/MIN/1.73M2
GLUCOSE SERPL-MCNC: 124 MG/DL (ref 74–99)
HCT VFR BLD AUTO: 48.1 % (ref 37–54)
HGB BLD-MCNC: 15.6 G/DL (ref 12.5–16.5)
IMM GRANULOCYTES # BLD AUTO: 0.08 K/UL (ref 0–0.58)
IMM GRANULOCYTES NFR BLD: 1 % (ref 0–5)
LYMPHOCYTES NFR BLD: 4.04 K/UL (ref 1.5–4)
LYMPHOCYTES RELATIVE PERCENT: 30 % (ref 20–42)
MCH RBC QN AUTO: 30.8 PG (ref 26–35)
MCHC RBC AUTO-ENTMCNC: 32.4 G/DL (ref 32–34.5)
MCV RBC AUTO: 94.9 FL (ref 80–99.9)
METHADONE UR QL: NEGATIVE
MONOCYTES NFR BLD: 0.93 K/UL (ref 0.1–0.95)
MONOCYTES NFR BLD: 7 % (ref 2–12)
NEUTROPHILS NFR BLD: 61 % (ref 43–80)
NEUTS SEG NFR BLD: 8.27 K/UL (ref 1.8–7.3)
OPIATES UR QL SCN: NEGATIVE
OXYCODONE UR QL SCN: NEGATIVE
PCP UR QL SCN: NEGATIVE
PLATELET # BLD AUTO: 236 K/UL (ref 130–450)
PMV BLD AUTO: 11 FL (ref 7–12)
POTASSIUM SERPL-SCNC: 4.2 MMOL/L (ref 3.5–5)
PROT SERPL-MCNC: 7.6 G/DL (ref 6.4–8.3)
RBC # BLD AUTO: 5.07 M/UL (ref 3.8–5.8)
SALICYLATES SERPL-MCNC: <0.3 MG/DL (ref 0–30)
SODIUM SERPL-SCNC: 137 MMOL/L (ref 132–146)
T4 FREE SERPL-MCNC: 1.1 NG/DL (ref 0.9–1.7)
TEST INFORMATION: NORMAL
TOXIC TRICYCLIC SC,BLOOD: NEGATIVE
TROPONIN I SERPL HS-MCNC: <6 NG/L (ref 0–11)
TSH SERPL DL<=0.05 MIU/L-ACNC: 1.52 UIU/ML (ref 0.27–4.2)
WBC OTHER # BLD: 13.5 K/UL (ref 4.5–11.5)

## 2024-01-21 PROCEDURE — G0480 DRUG TEST DEF 1-7 CLASSES: HCPCS

## 2024-01-21 PROCEDURE — 96374 THER/PROPH/DIAG INJ IV PUSH: CPT

## 2024-01-21 PROCEDURE — 71045 X-RAY EXAM CHEST 1 VIEW: CPT

## 2024-01-21 PROCEDURE — 93005 ELECTROCARDIOGRAM TRACING: CPT

## 2024-01-21 PROCEDURE — 84443 ASSAY THYROID STIM HORMONE: CPT

## 2024-01-21 PROCEDURE — 99285 EMERGENCY DEPT VISIT HI MDM: CPT

## 2024-01-21 PROCEDURE — 80143 DRUG ASSAY ACETAMINOPHEN: CPT

## 2024-01-21 PROCEDURE — 80179 DRUG ASSAY SALICYLATE: CPT

## 2024-01-21 PROCEDURE — 85025 COMPLETE CBC W/AUTO DIFF WBC: CPT

## 2024-01-21 PROCEDURE — 80053 COMPREHEN METABOLIC PANEL: CPT

## 2024-01-21 PROCEDURE — 84439 ASSAY OF FREE THYROXINE: CPT

## 2024-01-21 PROCEDURE — 80307 DRUG TEST PRSMV CHEM ANLYZR: CPT

## 2024-01-21 PROCEDURE — 84484 ASSAY OF TROPONIN QUANT: CPT

## 2024-01-21 PROCEDURE — 6360000002 HC RX W HCPCS

## 2024-01-21 RX ORDER — MIDAZOLAM HYDROCHLORIDE 2 MG/2ML
1 INJECTION, SOLUTION INTRAMUSCULAR; INTRAVENOUS ONCE
Status: COMPLETED | OUTPATIENT
Start: 2024-01-21 | End: 2024-01-21

## 2024-01-21 RX ORDER — MIDAZOLAM HYDROCHLORIDE 1 MG/ML
INJECTION INTRAMUSCULAR; INTRAVENOUS
Status: COMPLETED
Start: 2024-01-21 | End: 2024-01-21

## 2024-01-21 RX ORDER — HALOPERIDOL 5 MG/ML
5 INJECTION INTRAMUSCULAR ONCE
Status: DISCONTINUED | OUTPATIENT
Start: 2024-01-21 | End: 2024-01-22 | Stop reason: HOSPADM

## 2024-01-21 RX ADMIN — MIDAZOLAM HYDROCHLORIDE 1 MG: 1 INJECTION, SOLUTION INTRAMUSCULAR; INTRAVENOUS at 21:53

## 2024-01-21 RX ADMIN — MIDAZOLAM HYDROCHLORIDE 1 MG: 2 INJECTION, SOLUTION INTRAMUSCULAR; INTRAVENOUS at 21:53

## 2024-01-21 ASSESSMENT — LIFESTYLE VARIABLES
HOW MANY STANDARD DRINKS CONTAINING ALCOHOL DO YOU HAVE ON A TYPICAL DAY: 1 OR 2
HOW OFTEN DO YOU HAVE A DRINK CONTAINING ALCOHOL: NEVER

## 2024-01-21 NOTE — ED NOTES
SW received copy of pink slip and telehealth consent form. Due to acuity level and no beds at [facility]. SW advised for Pt to be referred to the access center.      Pt has been added to the log and will complete assessment once available.

## 2024-01-22 VITALS
WEIGHT: 257 LBS | TEMPERATURE: 98.3 F | DIASTOLIC BLOOD PRESSURE: 89 MMHG | BODY MASS INDEX: 34.81 KG/M2 | RESPIRATION RATE: 15 BRPM | HEART RATE: 80 BPM | SYSTOLIC BLOOD PRESSURE: 136 MMHG | OXYGEN SATURATION: 97 % | HEIGHT: 72 IN

## 2024-01-22 LAB
EKG ATRIAL RATE: 73 BPM
EKG P AXIS: 60 DEGREES
EKG P-R INTERVAL: 160 MS
EKG Q-T INTERVAL: 390 MS
EKG QRS DURATION: 90 MS
EKG QTC CALCULATION (BAZETT): 429 MS
EKG R AXIS: 59 DEGREES
EKG T AXIS: 28 DEGREES
EKG VENTRICULAR RATE: 73 BPM

## 2024-01-22 PROCEDURE — 93010 ELECTROCARDIOGRAM REPORT: CPT | Performed by: INTERNAL MEDICINE
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
## 2024-01-22 NOTE — ED NOTES
Patient is sitting in bed and is notably agitated. He states \"I'm refusing treatment and am walking out of here at 10PM so you better call security\". Dr. Randle notified. Constant Observer at the bedside.

## 2024-01-22 NOTE — ED NOTES
Patient appears restless/anxious but is cooperative. Constant observer remains at the bedside. Patient denies any further needs at this time.
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
## 2024-01-22 NOTE — ED NOTES
Patient is resting comfortably in bed. No complaints at this time. Constant observer in the hallway.

## 2024-01-22 NOTE — ED NOTES
Patient now medically cleared for psychiatric evaluation. Please see ED Provider Notes for details.

## 2024-01-22 NOTE — ED NOTES
2 bags of belongings given to [facility] crew. Report given. All papers including original pink slip also provided. [facility] [employer] at bedside

## 2024-01-22 NOTE — ED NOTES
SW request for a copy of the front and back of the original pink slip from PD to be faxed to [phone].

## 2024-01-22 NOTE — ED NOTES
Nurse to Nurse report called to Fortino Garcia. Informed him on patient's status, reason for Pink Slip, and information regarding the patient's ED stay. Provided an opportunity to answer any questions.

## 2024-01-22 NOTE — ED NOTES
Patient resting quietly in bed with no complaints at this time. Constant observer at the bedside.

## 2024-01-22 NOTE — ED NOTES
Pt has been accepted to [facility] by Dr Painting      Pt will go to adult unit room 208B     N2N is to be called to [phone]
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
## 2024-01-23 LAB
CHOLEST SERPL-MCNC: 181 MG/DL
HBA1C MFR BLD: 7.9 % (ref 4–5.6)
HDLC SERPL-MCNC: 35 MG/DL
LDLC SERPL CALC-MCNC: 111 MG/DL
TRIGL SERPL-MCNC: 173 MG/DL
VLDLC SERPL CALC-MCNC: 35 MG/DL

## 2024-05-20 ENCOUNTER — HOSPITAL ENCOUNTER (OUTPATIENT)
Age: 54
Discharge: HOME | End: 2024-05-20
Payer: MEDICAID

## 2024-05-20 LAB — VALPROATE SERPL-MCNC: 28 UG/ML (ref 50–100)

## 2024-05-20 PROCEDURE — 80164 ASSAY DIPROPYLACETIC ACD TOT: CPT

## 2024-05-20 PROCEDURE — 36415 COLL VENOUS BLD VENIPUNCTURE: CPT

## 2024-07-11 ENCOUNTER — HOSPITAL ENCOUNTER (INPATIENT)
Age: 54
LOS: 4 days | Discharge: HOME | End: 2024-07-16
Attending: STUDENT IN AN ORGANIZED HEALTH CARE EDUCATION/TRAINING PROGRAM | Admitting: PSYCHIATRY & NEUROLOGY
Payer: MEDICAID

## 2024-07-11 DIAGNOSIS — R45.850 HOMICIDAL IDEATION: Primary | ICD-10-CM

## 2024-07-11 LAB
ALBUMIN SERPL-MCNC: 4.1 G/DL (ref 3.5–5.2)
ALP SERPL-CCNC: 85 U/L (ref 40–129)
ALT SERPL-CCNC: 19 U/L (ref 0–40)
AMPHET UR QL SCN: NEGATIVE
ANION GAP SERPL CALCULATED.3IONS-SCNC: 13 MMOL/L (ref 7–16)
APAP SERPL-MCNC: <5 UG/ML (ref 10–30)
AST SERPL-CCNC: 15 U/L (ref 0–39)
BARBITURATES UR QL SCN: NEGATIVE
BASOPHILS # BLD: 0.07 K/UL (ref 0–0.2)
BASOPHILS NFR BLD: 1 % (ref 0–2)
BENZODIAZ UR QL: NEGATIVE
BILIRUB SERPL-MCNC: 0.3 MG/DL (ref 0–1.2)
BUN SERPL-MCNC: 14 MG/DL (ref 6–20)
BUPRENORPHINE UR QL: NEGATIVE
CALCIUM SERPL-MCNC: 9.8 MG/DL (ref 8.6–10.2)
CANNABINOIDS UR QL SCN: NEGATIVE
CHLORIDE SERPL-SCNC: 100 MMOL/L (ref 98–107)
CO2 SERPL-SCNC: 24 MMOL/L (ref 22–29)
COCAINE UR QL SCN: NEGATIVE
CREAT SERPL-MCNC: 0.6 MG/DL (ref 0.7–1.2)
EOSINOPHIL # BLD: 0.2 K/UL (ref 0.05–0.5)
EOSINOPHILS RELATIVE PERCENT: 2 % (ref 0–6)
ERYTHROCYTE [DISTWIDTH] IN BLOOD BY AUTOMATED COUNT: 14.6 % (ref 11.5–15)
ETHANOLAMINE SERPL-MCNC: <10 MG/DL (ref 0–0.08)
FENTANYL UR QL: NEGATIVE
GFR, ESTIMATED: >90 ML/MIN/1.73M2
GLUCOSE SERPL-MCNC: 118 MG/DL (ref 74–99)
HCT VFR BLD AUTO: 46 % (ref 37–54)
HGB BLD-MCNC: 15.1 G/DL (ref 12.5–16.5)
IMM GRANULOCYTES # BLD AUTO: 0.09 K/UL (ref 0–0.58)
IMM GRANULOCYTES NFR BLD: 1 % (ref 0–5)
LYMPHOCYTES NFR BLD: 3.73 K/UL (ref 1.5–4)
LYMPHOCYTES RELATIVE PERCENT: 32 % (ref 20–42)
MCH RBC QN AUTO: 30.2 PG (ref 26–35)
MCHC RBC AUTO-ENTMCNC: 32.8 G/DL (ref 32–34.5)
MCV RBC AUTO: 92 FL (ref 80–99.9)
METHADONE UR QL: NEGATIVE
MONOCYTES NFR BLD: 0.85 K/UL (ref 0.1–0.95)
MONOCYTES NFR BLD: 7 % (ref 2–12)
NEUTROPHILS NFR BLD: 58 % (ref 43–80)
NEUTS SEG NFR BLD: 6.9 K/UL (ref 1.8–7.3)
OPIATES UR QL SCN: POSITIVE
OXYCODONE UR QL SCN: NEGATIVE
PCP UR QL SCN: NEGATIVE
PLATELET # BLD AUTO: 243 K/UL (ref 130–450)
PMV BLD AUTO: 11.2 FL (ref 7–12)
POTASSIUM SERPL-SCNC: 4.7 MMOL/L (ref 3.5–5)
PROT SERPL-MCNC: 7.1 G/DL (ref 6.4–8.3)
RBC # BLD AUTO: 5 M/UL (ref 3.8–5.8)
SALICYLATES SERPL-MCNC: <0.3 MG/DL (ref 0–30)
SODIUM SERPL-SCNC: 137 MMOL/L (ref 132–146)
TEST INFORMATION: ABNORMAL
TOXIC TRICYCLIC SC,BLOOD: NEGATIVE
WBC OTHER # BLD: 11.8 K/UL (ref 4.5–11.5)

## 2024-07-11 PROCEDURE — 80053 COMPREHEN METABOLIC PANEL: CPT

## 2024-07-11 PROCEDURE — 93005 ELECTROCARDIOGRAM TRACING: CPT | Performed by: STUDENT IN AN ORGANIZED HEALTH CARE EDUCATION/TRAINING PROGRAM

## 2024-07-11 PROCEDURE — 80307 DRUG TEST PRSMV CHEM ANLYZR: CPT

## 2024-07-11 PROCEDURE — 99285 EMERGENCY DEPT VISIT HI MDM: CPT

## 2024-07-11 PROCEDURE — G0480 DRUG TEST DEF 1-7 CLASSES: HCPCS

## 2024-07-11 PROCEDURE — 80143 DRUG ASSAY ACETAMINOPHEN: CPT

## 2024-07-11 PROCEDURE — 80179 DRUG ASSAY SALICYLATE: CPT

## 2024-07-11 PROCEDURE — 85025 COMPLETE CBC W/AUTO DIFF WBC: CPT

## 2024-07-11 RX ORDER — LORAZEPAM 1 MG/1
1 TABLET ORAL ONCE
Status: DISCONTINUED | OUTPATIENT
Start: 2024-07-11 | End: 2024-07-16 | Stop reason: HOSPADM

## 2024-07-11 NOTE — ED PROVIDER NOTES
Reviewed   CBC WITH AUTO DIFFERENTIAL - Abnormal; Notable for the following components:       Result Value    WBC 11.8 (*)     All other components within normal limits   COMPREHENSIVE METABOLIC PANEL W/ REFLEX TO MG FOR LOW K - Abnormal; Notable for the following components:    Glucose 118 (*)     Creatinine 0.6 (*)     All other components within normal limits   SERUM DRUG SCREEN - Abnormal; Notable for the following components:    Acetaminophen Level <5 (*)     All other components within normal limits   URINE DRUG SCREEN - Abnormal; Notable for the following components:    Opiates, Urine POSITIVE (*)     All other components within normal limits       As interpreted by me, the above displayed labs are abnormal. All other labs obtained during this visit were within normal range or not returned as of this dictation.    EKG Interpretation:  Interpreted by emergency department physician, Baltazar Burr, DO  Rate: 84  Rhythm: Sinus  Interpretation: EKG obtained demonstrate normal sinus rhythm, rate 84, right axis, QTc 444, nonseptic ST segment changes no acute ischemic changes.  Comparison: stable as compared to patient's most recent EKG    RADIOLOGY:   Non-plain film images such as CT, Ultrasound and MRI are read by the radiologist. Plain radiographic images are visualized and preliminarily interpreted by the ED Provider with the below findings:      Interpretation per the Radiologist below, if available at the time of this note:    No orders to display     No results found.    No results found.    PROCEDURES   Unless otherwise noted below, none     CRITICAL CARE TIME (.cct)   Per attending attestation    EMERGENCY DEPARTMENT COURSE    Vitals:    Vitals:    07/11/24 1453   BP: 131/78   Pulse: 83   Resp: 16   Temp: 99 °F (37.2 °C)   TempSrc: Oral   SpO2: 93%       Patient was given the following medications:  Medications - No data to display      Is this patient to be included in the SEP-1 core measure due to severe  Behavioral Health Hold 07/11/2024 06:05:14 PM    PATIENT REFERRED TO:  No follow-up provider specified.    DISCHARGE MEDICATIONS:  New Prescriptions    No medications on file       DISCONTINUED MEDICATIONS:  Discontinued Medications    No medications on file            (Please note that portions of this note were completed with a voice recognition program.  Efforts were made to edit the dictations but occasionally words are mis-transcribed.)    Baltazar Burr DO (electronically signed)         Baltazar Burr,   07/11/24 4266

## 2024-07-12 LAB
EKG ATRIAL RATE: 84 BPM
EKG P AXIS: 67 DEGREES
EKG P-R INTERVAL: 168 MS
EKG Q-T INTERVAL: 376 MS
EKG QRS DURATION: 90 MS
EKG QTC CALCULATION (BAZETT): 444 MS
EKG R AXIS: 96 DEGREES
EKG T AXIS: 39 DEGREES
EKG VENTRICULAR RATE: 84 BPM

## 2024-07-12 PROCEDURE — 6370000000 HC RX 637 (ALT 250 FOR IP): Performed by: NURSE PRACTITIONER

## 2024-07-12 PROCEDURE — 90792 PSYCH DIAG EVAL W/MED SRVCS: CPT | Performed by: NURSE PRACTITIONER

## 2024-07-12 PROCEDURE — 93010 ELECTROCARDIOGRAM REPORT: CPT | Performed by: INTERNAL MEDICINE

## 2024-07-12 PROCEDURE — 1240000000 HC EMOTIONAL WELLNESS R&B

## 2024-07-12 PROCEDURE — 6370000000 HC RX 637 (ALT 250 FOR IP): Performed by: PSYCHIATRY & NEUROLOGY

## 2024-07-12 RX ORDER — HYDROXYZINE PAMOATE 50 MG/1
50 CAPSULE ORAL 3 TIMES DAILY PRN
Status: DISCONTINUED | OUTPATIENT
Start: 2024-07-12 | End: 2024-07-16 | Stop reason: HOSPADM

## 2024-07-12 RX ORDER — LANOLIN ALCOHOL/MO/W.PET/CERES
3 CREAM (GRAM) TOPICAL NIGHTLY PRN
Status: DISCONTINUED | OUTPATIENT
Start: 2024-07-12 | End: 2024-07-16 | Stop reason: HOSPADM

## 2024-07-12 RX ORDER — LEVOCETIRIZINE DIHYDROCHLORIDE 5 MG/1
5 TABLET, FILM COATED ORAL NIGHTLY
COMMUNITY

## 2024-07-12 RX ORDER — ATORVASTATIN CALCIUM 40 MG/1
20 TABLET, FILM COATED ORAL DAILY
Status: DISCONTINUED | OUTPATIENT
Start: 2024-07-12 | End: 2024-07-16 | Stop reason: HOSPADM

## 2024-07-12 RX ORDER — HALOPERIDOL 5 MG/1
5 TABLET ORAL EVERY 6 HOURS PRN
Status: DISCONTINUED | OUTPATIENT
Start: 2024-07-12 | End: 2024-07-16 | Stop reason: HOSPADM

## 2024-07-12 RX ORDER — HALOPERIDOL 5 MG/ML
5 INJECTION INTRAMUSCULAR EVERY 6 HOURS PRN
Status: DISCONTINUED | OUTPATIENT
Start: 2024-07-12 | End: 2024-07-16 | Stop reason: HOSPADM

## 2024-07-12 RX ORDER — PROPRANOLOL HCL 60 MG
60 CAPSULE, EXTENDED RELEASE 24HR ORAL DAILY
Status: DISCONTINUED | OUTPATIENT
Start: 2024-07-12 | End: 2024-07-16 | Stop reason: HOSPADM

## 2024-07-12 RX ORDER — MAGNESIUM HYDROXIDE/ALUMINUM HYDROXICE/SIMETHICONE 120; 1200; 1200 MG/30ML; MG/30ML; MG/30ML
30 SUSPENSION ORAL PRN
Status: DISCONTINUED | OUTPATIENT
Start: 2024-07-12 | End: 2024-07-16 | Stop reason: HOSPADM

## 2024-07-12 RX ORDER — GABAPENTIN 400 MG/1
400 CAPSULE ORAL 4 TIMES DAILY
Status: DISCONTINUED | OUTPATIENT
Start: 2024-07-12 | End: 2024-07-12

## 2024-07-12 RX ORDER — PANTOPRAZOLE SODIUM 40 MG/1
40 TABLET, DELAYED RELEASE ORAL
Status: DISCONTINUED | OUTPATIENT
Start: 2024-07-13 | End: 2024-07-16 | Stop reason: HOSPADM

## 2024-07-12 RX ORDER — GABAPENTIN 100 MG/1
200 CAPSULE ORAL 3 TIMES DAILY
Status: DISCONTINUED | OUTPATIENT
Start: 2024-07-12 | End: 2024-07-16 | Stop reason: HOSPADM

## 2024-07-12 RX ORDER — HYDROXYZINE PAMOATE 50 MG/1
50 CAPSULE ORAL 3 TIMES DAILY PRN
COMMUNITY

## 2024-07-12 RX ORDER — DULAGLUTIDE 0.75 MG/.5ML
0.75 INJECTION, SOLUTION SUBCUTANEOUS WEEKLY
COMMUNITY

## 2024-07-12 RX ORDER — PIOGLITAZONEHYDROCHLORIDE 30 MG/1
30 TABLET ORAL DAILY
COMMUNITY

## 2024-07-12 RX ORDER — LISINOPRIL 20 MG/1
20 TABLET ORAL DAILY
Status: DISCONTINUED | OUTPATIENT
Start: 2024-07-12 | End: 2024-07-16 | Stop reason: HOSPADM

## 2024-07-12 RX ORDER — NICOTINE 21 MG/24HR
1 PATCH, TRANSDERMAL 24 HOURS TRANSDERMAL DAILY
Status: DISCONTINUED | OUTPATIENT
Start: 2024-07-12 | End: 2024-07-16 | Stop reason: HOSPADM

## 2024-07-12 RX ORDER — PIOGLITAZONEHYDROCHLORIDE 15 MG/1
30 TABLET ORAL DAILY
Status: DISCONTINUED | OUTPATIENT
Start: 2024-07-12 | End: 2024-07-16 | Stop reason: HOSPADM

## 2024-07-12 RX ORDER — DIVALPROEX SODIUM 500 MG/1
500 TABLET, DELAYED RELEASE ORAL 3 TIMES DAILY
Status: DISCONTINUED | OUTPATIENT
Start: 2024-07-12 | End: 2024-07-16 | Stop reason: HOSPADM

## 2024-07-12 RX ORDER — MONTELUKAST SODIUM 10 MG/1
10 TABLET ORAL NIGHTLY
Status: DISCONTINUED | OUTPATIENT
Start: 2024-07-12 | End: 2024-07-16 | Stop reason: HOSPADM

## 2024-07-12 RX ORDER — TAMSULOSIN HYDROCHLORIDE 0.4 MG/1
0.4 CAPSULE ORAL DAILY
Status: DISCONTINUED | OUTPATIENT
Start: 2024-07-12 | End: 2024-07-16 | Stop reason: HOSPADM

## 2024-07-12 RX ORDER — ACETAMINOPHEN 325 MG/1
650 TABLET ORAL EVERY 6 HOURS PRN
Status: DISCONTINUED | OUTPATIENT
Start: 2024-07-12 | End: 2024-07-16 | Stop reason: HOSPADM

## 2024-07-12 RX ORDER — BENZTROPINE MESYLATE 1 MG/1
1 TABLET ORAL 2 TIMES DAILY
Status: DISCONTINUED | OUTPATIENT
Start: 2024-07-12 | End: 2024-07-16 | Stop reason: HOSPADM

## 2024-07-12 RX ORDER — QUETIAPINE FUMARATE 200 MG/1
200 TABLET, FILM COATED ORAL NIGHTLY
Status: DISCONTINUED | OUTPATIENT
Start: 2024-07-12 | End: 2024-07-14

## 2024-07-12 RX ORDER — FLUTICASONE PROPIONATE 50 MCG
1 SPRAY, SUSPENSION (ML) NASAL DAILY
Status: DISCONTINUED | OUTPATIENT
Start: 2024-07-12 | End: 2024-07-16 | Stop reason: HOSPADM

## 2024-07-12 RX ADMIN — GABAPENTIN 200 MG: 100 CAPSULE ORAL at 21:42

## 2024-07-12 RX ADMIN — LISINOPRIL 20 MG: 20 TABLET ORAL at 12:04

## 2024-07-12 RX ADMIN — BENZTROPINE MESYLATE 1 MG: 1 TABLET ORAL at 11:55

## 2024-07-12 RX ADMIN — GABAPENTIN 200 MG: 100 CAPSULE ORAL at 16:25

## 2024-07-12 RX ADMIN — CHOLECALCIFEROL TAB 125 MCG (5000 UNIT) 5000 UNITS: 125 TAB at 11:58

## 2024-07-12 RX ADMIN — PROPRANOLOL HYDROCHLORIDE 60 MG: 60 CAPSULE, EXTENDED RELEASE ORAL at 11:55

## 2024-07-12 RX ADMIN — GABAPENTIN 200 MG: 100 CAPSULE ORAL at 11:57

## 2024-07-12 RX ADMIN — TAMSULOSIN HYDROCHLORIDE 0.4 MG: 0.4 CAPSULE ORAL at 12:04

## 2024-07-12 RX ADMIN — ATORVASTATIN CALCIUM 20 MG: 40 TABLET, FILM COATED ORAL at 11:58

## 2024-07-12 RX ADMIN — DIVALPROEX SODIUM 500 MG: 500 TABLET, DELAYED RELEASE ORAL at 13:51

## 2024-07-12 RX ADMIN — MONTELUKAST 10 MG: 10 TABLET, FILM COATED ORAL at 21:42

## 2024-07-12 RX ADMIN — FLUTICASONE PROPIONATE 1 SPRAY: 50 SPRAY, METERED NASAL at 11:54

## 2024-07-12 RX ADMIN — HYDROXYZINE PAMOATE 50 MG: 50 CAPSULE ORAL at 18:18

## 2024-07-12 RX ADMIN — PIOGLITAZONE 30 MG: 15 TABLET ORAL at 11:58

## 2024-07-12 RX ADMIN — BENZTROPINE MESYLATE 1 MG: 1 TABLET ORAL at 21:42

## 2024-07-12 RX ADMIN — QUETIAPINE FUMARATE 200 MG: 200 TABLET ORAL at 21:42

## 2024-07-12 RX ADMIN — HALOPERIDOL 5 MG: 5 TABLET ORAL at 18:17

## 2024-07-12 RX ADMIN — DIVALPROEX SODIUM 500 MG: 500 TABLET, DELAYED RELEASE ORAL at 21:42

## 2024-07-12 RX ADMIN — ALUMINUM HYDROXIDE, MAGNESIUM HYDROXIDE, AND SIMETHICONE 30 ML: 1200; 120; 1200 SUSPENSION ORAL at 10:00

## 2024-07-12 ASSESSMENT — SLEEP AND FATIGUE QUESTIONNAIRES
AVERAGE NUMBER OF SLEEP HOURS: 8
AVERAGE NUMBER OF SLEEP HOURS: 8
DO YOU HAVE DIFFICULTY SLEEPING: NO
DO YOU USE A SLEEP AID: NO
DO YOU HAVE DIFFICULTY SLEEPING: NO
DO YOU USE A SLEEP AID: NO

## 2024-07-12 ASSESSMENT — PATIENT HEALTH QUESTIONNAIRE - PHQ9
1. LITTLE INTEREST OR PLEASURE IN DOING THINGS: SEVERAL DAYS
SUM OF ALL RESPONSES TO PHQ QUESTIONS 1-9: 0
2. FEELING DOWN, DEPRESSED OR HOPELESS: NOT AT ALL
SUM OF ALL RESPONSES TO PHQ QUESTIONS 1-9: 0
SUM OF ALL RESPONSES TO PHQ QUESTIONS 1-9: 2
1. LITTLE INTEREST OR PLEASURE IN DOING THINGS: NOT AT ALL
SUM OF ALL RESPONSES TO PHQ9 QUESTIONS 1 & 2: 0
SUM OF ALL RESPONSES TO PHQ QUESTIONS 1-9: 2
SUM OF ALL RESPONSES TO PHQ9 QUESTIONS 1 & 2: 2
SUM OF ALL RESPONSES TO PHQ QUESTIONS 1-9: 2
2. FEELING DOWN, DEPRESSED OR HOPELESS: SEVERAL DAYS
SUM OF ALL RESPONSES TO PHQ QUESTIONS 1-9: 2
SUM OF ALL RESPONSES TO PHQ QUESTIONS 1-9: 0
SUM OF ALL RESPONSES TO PHQ QUESTIONS 1-9: 0

## 2024-07-12 ASSESSMENT — PAIN SCALES - GENERAL
PAINLEVEL_OUTOF10: 0
PAINLEVEL_OUTOF10: 0

## 2024-07-12 NOTE — BH NOTE
4 Eyes Skin Assessment     NAME:  Juvenal Andre  YOB: 1970  MEDICAL RECORD NUMBER:  [id number]    The patient is being assessed for  Admission    I agree that at least one RN has performed a thorough Head to Toe Skin Assessment on the patient. ALL assessment sites listed below have been assessed.      Areas assessed by both nurses:    Head, Face, Ears, Shoulders, Back, Chest, Arms, Elbows, Hands, Sacrum. Buttock, Coccyx, Ischium, Legs. Feet and Heels, and Under Medical Devices         Does the Patient have a Wound? No noted wound(s)       Cooper Prevention initiated by RN: Yes  Wound Care Orders initiated by RN: No    Pressure Injury (Stage 3,4, Unstageable, DTI, NWPT, and Complex wounds) if present, place Wound referral order by RN under : No    New Ostomies, if present place, Ostomy referral order under : No     Nurse 1 eSignature: Electronically signed by Blair Medina RN on 7/12/24 at 2:41 AM EDT    **SHARE this note so that the co-signing nurse can place an eSignature**    Nurse 2 eSignature: Electronically signed by Denise Fagan RN on 7/12/24 at 5:13 AM EDT

## 2024-07-12 NOTE — H&P
be preoccupied he has limited insight and judgment regarding circumstance of his hospitalization need for treatment he has some underlying irritability.  She offers little conversation on assessment today.  He is a poor historian and he is unable to tell us about where he follows up outpatient for mental health treatment he states \"my meds come to be in bubble packs.\"  Per nursing staff patient continues to follow up outpatient [facility] however his pharmacy is changed to the [facility] due to the [facility] closing.  Insight and judgment is poor, impulse control is adequate this time he is alert oriented time place and person      Past psychiatric history: Patient has a diagnosis of schizoaffective disorder, he has resided in a group home for the last 40 years.  He follows outpatient at [facility] he has become noncompliant with his medications and treatment due to paranoia.  He was last hospitalized here in March 2023 and [facility] he does have a legal guardian     Legal history: Unknown     Substance abuse history: Unknown     Personal family social history:   Patient has resided in a group home for the last 40 years due to his diagnosis of schizoaffective disorder, has a legal guardian Viral perez, states was assigned to him sometime ago.      Past Medical History:        Diagnosis Date    Anxiety     Arthritis     Hyperlipidemia     Hypertension     Impulse control disease     Schizo affective schizophrenia (HCC)        Medications Prior to Admission:   Medications Prior to Admission: sertraline (ZOLOFT) 50 MG tablet, Take 1 tablet by mouth daily  traZODone (DESYREL) 150 MG tablet, Take 0.5 tablets by mouth nightly as needed for Sleep  QUEtiapine (SEROQUEL) 100 MG tablet, Take 1 tablet by mouth nightly  QUEtiapine (SEROQUEL) 50 MG tablet, Take 1 tablet by mouth daily  ARIPiprazole lauroxil (ARISTADA) 882 MG/3.2ML PRSY      Collateral Information:  Will obtain collateral information from the family or friends.  Will obtain medical records as appropriate from out patient providers  Will consult the hospitalist for a physical exam to rule out any co-morbid physical condition.    Home medication Reconciled       New Medications started during this admission :        Prn Haldol 5mg and Vistaril 50mg q6hr for extreme agitation.  Trazodone as ordered for insomnia  Vistaril as ordered for anxiety      Psychotherapy:   Encourage participation in milieu and group therapy  Individual therapy as needed        Patient's diagnosis, treatment plan, medication management was formulated at the end of evaluation and after reviewing relevant documentation. Patient was seen directly by myself and Dr. Garland  Medications were verified by patient's nurse  We will continue patient's home medications of  Cogentin 1 mg twice daily  Depakote 500 g 3 times daily with a valproic acid level on Sunday  Neurontin 200 mg 3 times daily  Seroquel 200 mg at bedtime  Patient is due for his Aristada 882 mg IM injection today      Can discontinue constant observer.  Patient is deemed to be moderate risk for suicide based on productive risk factors as well as risk mitigation          Behavioral Services  Medicare Certification Upon Admission    I certify that this patient's inpatient psychiatric hospital admission is medically necessary for:    [x] (1) Treatment which could reasonably be expected to improve this patient's condition,       [ ] (2) Or for diagnostic study;     AND     [x](2) The inpatient psychiatric services are provided while the individual is under the care of a physician and are included in the individualized plan of care.    Estimated length of stay/service 3 to 7 days based on stability    Plan for post-hospital care outpatient psychiatric and counseling services    Electronically signed by GRISELDA Tapia CNP on 7/12/2024 at 5:36 AM

## 2024-07-12 NOTE — PLAN OF CARE
[facility]  Initial Interdisciplinary Treatment Plan NOTE    Review Date & Time:  7/12/24 1000    Patient was in treatment team    Admission Type:   Admission Type: Involuntary    Reason for admission:  Reason for Admission: Patient upset with living situation and mental state.      Estimated Length of Stay Update:   5 DAYS  Estimated Discharge Date Update:  MONDAY    EDUCATION:   Learner Progress Toward Treatment Goals: Reviewed results and recommendations of this team    Method: Small group    Outcome: Needs reinforcement    PATIENT GOALS: TAKE MY MEDICATIONS AND GO TO GROUPS\"    PLAN/TREATMENT RECOMMENDATIONS UPDATE: ASSESS FOR RISK OF HARM TO OTHERS, MEDICATIONS, GROUPS, SUPPORTIVE CARE, DISCHARGE PLANNING AND FOLLOW UP, COLLATERAL INFORMATION, REVIEW PLAN WITH GUARDIAN.    GOALS UPDATE:   Time frame for Short-Term Goals:  3 DAYS    Ricarda Sheridan RN

## 2024-07-12 NOTE — ED NOTES
The patient is a 53-year-old  male who was pink slipped by the emergency room doctor.  The patient reportedly stated he was having increasing thoughts of harming someone else and has been dealing with a lot of stress.  According to the emergency room doctor he would not go into details but he reported that he believes his medications are causing his symptoms.    Per the patient chart the Pt is diagnosed with Schizoaffective disorder, bipolar type. Per record Pt treats with [facility]. Pt does have a hx of  hospitalization with his last admission being on 1/21/2024 to [facility]  And on 3/22/2023 at [facility].     The patient presented irritable while being transferred to the back area of the [facility].  The patient will [facility]

## 2024-07-12 NOTE — PLAN OF CARE
Problem: Pain  Goal: Verbalizes/displays adequate comfort level or baseline comfort level  Outcome: Progressing     Problem: Anxiety  Goal: Will report anxiety at manageable levels  Description: INTERVENTIONS:  1. Administer medication as ordered  2. Teach and rehearse alternative coping skills  3. Provide emotional support with 1:1 interaction with staff  Outcome: Progressing     Problem: Coping  Goal: Pt/Family able to verbalize concerns and demonstrate effective coping strategies  Description: INTERVENTIONS:  1. Assist patient/family to identify coping skills, available support systems and cultural and spiritual values  2. Provide emotional support, including active listening and acknowledgement of concerns of patient and caregivers  3. Reduce environmental stimuli, as able  4. Instruct patient/family in relaxation techniques, as appropriate  5. Assess for spiritual pain/suffering and initiate Spiritual Care, Psychosocial Clinical Specialist consults as needed  Outcome: Progressing     Problem: Decision Making  Goal: Pt/Family able to effectively weigh alternatives and participate in decision making related to treatment and care  Description: INTERVENTIONS:  1. Determine when there are differences between patient's view, family's view, and healthcare provider's view of condition  2. Facilitate patient and family articulation of goals for care  3. Help patient and family identify pros/cons of alternative solutions  4. Provide information as requested by patient/family  5. Respect patient/family right to receive or not to receive information  6. Serve as a liaison between patient and family and health care team  7. Initiate Consults from Ethics, Palliative Care or initiate Family Care Conference as is appropriate  Outcome: Progressing     Problem: Depression/Self Harm  Goal: Effect of psychiatric condition will be minimized and patient will be protected from self harm  Description: INTERVENTIONS:  1. Assess

## 2024-07-12 NOTE — PROGRESS NOTES
[facility] MEDICAL OFFICE STAFF, ARLETH, VERIFIED THAT PT. LAST ARISTADA 882 MG. WAS GIVEN ON JUNE 13, 2024 AND WAS DUE YESTERDAY, BUT PT. MISSED APPT..

## 2024-07-12 NOTE — PLAN OF CARE
Problem: Pain  Goal: Verbalizes/displays adequate comfort level or baseline comfort level  7/12/2024 1415 by Ricarda Sheridan RN  Outcome: Progressing  7/12/2024 0238 by Blair Medina RN  Outcome: Progressing     Problem: Anxiety  Goal: Will report anxiety at manageable levels  Description: INTERVENTIONS:  1. Administer medication as ordered  2. Teach and rehearse alternative coping skills  3. Provide emotional support with 1:1 interaction with staff  7/12/2024 1415 by Ricarda Sheridan RN  Outcome: Progressing  7/12/2024 0238 by Blair Medina RN  Outcome: Progressing     Problem: Coping  Goal: Pt/Family able to verbalize concerns and demonstrate effective coping strategies  Description: INTERVENTIONS:  1. Assist patient/family to identify coping skills, available support systems and cultural and spiritual values  2. Provide emotional support, including active listening and acknowledgement of concerns of patient and caregivers  3. Reduce environmental stimuli, as able  4. Instruct patient/family in relaxation techniques, as appropriate  5. Assess for spiritual pain/suffering and initiate Spiritual Care, Psychosocial Clinical Specialist consults as needed  7/12/2024 1415 by Ricarda Sheridan RN  Outcome: Progressing  7/12/2024 0238 by Blair Medina RN  Outcome: Progressing     Problem: Decision Making  Goal: Pt/Family able to effectively weigh alternatives and participate in decision making related to treatment and care  Description: INTERVENTIONS:  1. Determine when there are differences between patient's view, family's view, and healthcare provider's view of condition  2. Facilitate patient and family articulation of goals for care  3. Help patient and family identify pros/cons of alternative solutions  4. Provide information as requested by patient/family  5. Respect patient/family right to receive or not to receive information  6. Serve as a liaison between patient and family and health care team  7. Initiate Consults

## 2024-07-12 NOTE — BH NOTE
Anxious, in bed with covers pulled up to just under his chin while laying flat on back. Patient complaining of \"visions,\" and \"hearing things not good.\" Saying \"I'm having problems with my thoughts.\" Complaints of \"a voice\" but would not share the details of what this voice was saying to him. Given Haldol 5 mg PO at this time, see eMar. Also given Vistaril 50 mg for anxious demeanor. Patient polite and appreciative of the medication and the therapeutic presence.

## 2024-07-12 NOTE — BH NOTE
[facility]  Admission Note     Patient pink slipped for having thoughts of harming others without reason. Patient denied SI/HI. Patient rated anxiety 8/10 and depression. 5/10. Patient does have legal guardian. Patient denied hallucinations and delusions. Patient was calm and cooperative with admission process. Patient signed all admission paperwork. Patient oriented to unit and shown to room. Safety rounds done q15 minutes. Will continue to monitor.            Admission Type:   Admission Type: Involuntary    Reason for admission:  Reason for Admission: Patient upset with living situation and mental state.      Addictive Behavior:   Addictive Behavior  In the Past 3 Months, Have You Felt or Has Someone Told You That You Have a Problem With  : None    Medical Problems:   Past Medical History:   Diagnosis Date    Anxiety     Arthritis     Hyperlipidemia     Hypertension     Impulse control disease     Schizo affective schizophrenia (HCC)        Status EXAM:  Mental Status and Behavioral Exam  Normal: No  Level of Assistance: Independent/Self  Facial Expression: Avoids Gaze  Affect: Constricted  Level of Consciousness: Alert  Frequency of Checks: 4 times per hour, close  Mood:Normal: No  Mood: Anxious  Motor Activity:Normal: Yes  Eye Contact: Poor  Observed Behavior: Cooperative  Sexual Misconduct History: Current - no  Preception: [place] to person, [place] to time, [place] to place, [place] to situation  Attention:Normal: No  Attention: Distractible  Thought Processes: Circumstantial  Thought Content:Normal: No  Thought Content: Poverty of content  Depression Symptoms: Impaired concentration  Anxiety Symptoms: Generalized  Shani Symptoms: No problems reported or observed.  Hallucinations: None  Delusions: No  Memory:Normal: No  Memory: Poor remote, Poor recent  Insight and Judgment: No  Insight and Judgment: Poor insight, Poor judgment    Tobacco Screening:  Practical Counseling, on admission, ross ANGEL  if applicable and completed (first 3 are required if patient doesn't refuse):            ( ) Recognizing danger situations (included triggers and roadblocks)                    ( ) Coping skills (new ways to manage stress,relaxation techniques, changing routine, distraction)                                                           ( ) Basic information about quitting (benefits of quitting, techniques in how to quit, available resources  ( ) [facility]                                                                                                                   ( ) Patient refused counseling  ( ) Patient has not smoked in the last 30 days    Metabolic Screening:    Lab Results   Component Value Date    LABA1C 7.9 (H) 01/23/2024       Lab Results   Component Value Date    CHOL 181 01/23/2024    CHOL 170 03/24/2023    CHOL 139 04/11/2021    CHOL 197 08/07/2019    CHOL 158 06/21/2011     Lab Results   Component Value Date    TRIG 173 (H) 01/23/2024    TRIG 278 (H) 03/24/2023    TRIG 217 (H) 04/11/2021    TRIG 122 08/07/2019    TRIG 209 (H) 06/21/2011     Lab Results   Component Value Date    HDL 35 (L) 01/23/2024    HDL 36 03/24/2023    HDL 30 04/11/2021    HDL 48 08/07/2019    HDL 32.0 (A) 06/21/2011     No components found for: \"LDLCAL\"  No components found for: \"LABVLDL\"      There is no height or weight on file to calculate BMI.    BP Readings from Last 2 Encounters:   07/12/24 121/79   01/22/24 136/89       Recliner Assessment:  Patient is able to demonstrate the ability to move from a reclining position to an upright position within the recliner.    Pt admitted with followings belongings:  Dental Appliances: None  Vision - Corrective Lenses: Eyeglasses  Hearing Aid: None  Jewelry: None  Body Piercings Removed: No  Clothing: Shorts, Socks, Footwear  Other Valuables: Wallet  The following personal items were collected during admission. Items secured in locker/safe.

## 2024-07-12 NOTE — GROUP NOTE
Group Therapy Note    Date: 7/12/2024    Group Start Time: 0945  Group End Time: 1015  Group Topic: Psychoeducation    [facility]    Rachel Ayoub CTRS    Group Therapy Note    Attendees: 14    Date: 7/12/2024  Start Time: 0945  End Time:  1015  Number of Participants: 14    Type of Group: Psychoeducation    Name:  Social Supports    Patient's Goal:  Identify types of social supports, ways social support changes, how social supports affect mental health, how to increase social supports.    Notes:  CTRS led educational group discussion on social supports. Encouraged patients to share their experiences. Patient did not add to group discussion but listened quietly to group.    Status After Intervention:  Improved    Participation Level: Active Listener    Participation Quality: Appropriate, Attentive      Speech:  None      Thought Process/Content: None observed      Affective Functioning: Blunted      Mood:  Flat      Level of consciousness:  Alert and Attentive      Response to Learning: Able to verbalize current knowledge/experience, Able to verbalize/acknowledge new learning, Able to retain information, Capable of insight, Able to change behavior, and Progressing to goal      Endings: None Reported    Modes of Intervention: Education, Support, Socialization, Exploration, Clarifying, and Problem-solving      Discipline Responsible: Psychoeducational Specialist      Signature:  LALY Valles

## 2024-07-12 NOTE — GROUP NOTE
Group Therapy Note    Date: 7/12/2024    Group Start Time: 0930  Group End Time: 0945  Group Topic: Community Meeting    [facility]    Rachel Ayoub CTRS    Group Therapy Note    Attendees: 13    Date: 7/12/2024  Start Time: 0930  End Time:  0945  Number of Participants: 13    Type of Group: Community Meeting    Was updated on expectations of the unit, staffing, and programming.  Patient shared goal for today as \"Maintain taking my meds, going to groups.\"    Status After Intervention:  Improved    Participation Level: Active Listener and Interactive    Participation Quality: Appropriate, Attentive, and Sharing      Speech:  normal      Thought Process/Content: Logical  Linear      Affective Functioning: Blunted      Mood:  Flat      Level of consciousness:  Alert and Attentive      Response to Learning: Able to verbalize current knowledge/experience, Able to verbalize/acknowledge new learning, Able to retain information, Capable of insight, Able to change behavior, and Progressing to goal      Endings: None Reported    Modes of Intervention: Education, Support, Socialization, Exploration, Clarifying, and Problem-solving      Discipline Responsible: Psychoeducational Specialist      Signature:  LALY Valles

## 2024-07-12 NOTE — ED NOTES
CAll to pt's guardian- [phone]-- Viral Quick- Isis Dean on call for [facility] - gave permission to treat- and stated that the pt refused his shot today.  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 She stated that at his baseline he can be challenging and will make verbal threats but he has never physically harmed others that she is aware of.   Informed her that the pt will be reviewed for admission and we will follow up if the pt is not admitted here.

## 2024-07-12 NOTE — CARE COORDINATION
Biopsychosocial Assessment Note    Social work met with patient to complete the biopsychosocial assessment and C-SSRS.     Chief Complaint: pt stated that he is currently in the hospital because \"I have been in the group home for almost 6 years and the last 3 years my things have been going missing in my apartment and I think people are breaking in.\"     Mental Status Exam: Pt is alert and oriented x4. Pt's mood is sad and depressed, affect is flat and blunt. Pt's speech is muffled, rate is fast and volume is normal. Pt's eye contact is poor. Pt's thoughts process is circumstantial, thought content is preoccupied and paranoid. Pt's memory is impaired, pt is a poor recent historian. Pt's insight and judgement is poor. Pt was calm and cooperative during assessment and offered good information. Pt denied current SI, HI, AVH.      Clinical Summary: Pt is a 53-year-old male, who presented to the ER due to increased paranoia in his group home apartment thinking that people are breaking in. Per ED notes, \"pink slipped by the emergency room doctor. The patient reportedly stated he was having increasing thoughts of harming someone else and has been dealing with a lot of stress.\" Pt stated that he does not want to harm anyone but if he is attacked in his home he stated that he will defend himself.    Pt stated that he has a previous history of inpatient mental health hospitalizations with [facility]  being his last admission. Pt was unable to recall when his last admission was, however per chart pt was admitted 3/2023 and he followed up with [facility] and was put on the aristada injection. Pt stated that he has been going to his appointments and he uses [facility] for transportation to get to them. Pt reported that he has been compliant with his medications. Pt stated that he does not like the provider at [facility] but was unable to state why. Pt denied having any suicidal ideations, plans, attempts or intent to end

## 2024-07-12 NOTE — CARE COORDINATION
KIARA received a voicemail from Viral Lenz [phone] extension [phone] requesting a call back.     KIARA spoke with Viral who stated that the pt is able return back to his group home/apartment when he is discharged from the hospital. Viral stated that the pt has been having ongoing fixed delusion with the apartment and he thinks there are people who are bypassing the cameras and there are marks on the walls and the people going into the apartment and stealing stuff. Viral stated to the pt this is very real and he thinks it is happening and shows Viral the davila on the wall. Viral stated that the delusions are always there however when he is stable he is able to have a conversation without discussing these delusions and they are less severe.    Viral stated that the pt was suppose to get his aristada MENDOZA yesterday however he refused it and does not trust the medications the NP at [facility] is giving him. Viral stated that he does not think the pt wants to go to [facility] anymore and Viral is open to the pt being referred to the [facility] if the pt wants to change providers, he mentioned [facility] . Pt will need an insurance ride home and the address was confirmed with Viral, [address]. Viral denied access to guns/weapons to his knowledge.

## 2024-07-12 NOTE — PROGRESS NOTES
Pt attended afternoon smoking cessation group. Pt appeared to be an active listener. Pt is able to share appropriately when prompted and asked facilitator relevant questions.  Pt was participant 1 of 14.    Electronically signed by Dominique Kennedy on 7/12/2024 at 3:21 PM

## 2024-07-12 NOTE — ED NOTES
Patient arrived in [address]. As patient was walking by the 's desk, he stated \"What are you looking at bit? You look like you have a staring problem\" then proceeded to walk to room 30.

## 2024-07-12 NOTE — ED NOTES
Called Dr. Garland to present patient for behavioral health admission. Patient accepted to [address] [place] [address].

## 2024-07-12 NOTE — ED NOTES
Called patient's guardian @ [phone] to inform guardian that patient is accepted to our behavioral health unit and will be admitted here.

## 2024-07-13 PROCEDURE — 1240000000 HC EMOTIONAL WELLNESS R&B

## 2024-07-13 PROCEDURE — 6370000000 HC RX 637 (ALT 250 FOR IP): Performed by: NURSE PRACTITIONER

## 2024-07-13 PROCEDURE — 99232 SBSQ HOSP IP/OBS MODERATE 35: CPT | Performed by: NURSE PRACTITIONER

## 2024-07-13 PROCEDURE — 6370000000 HC RX 637 (ALT 250 FOR IP): Performed by: PSYCHIATRY & NEUROLOGY

## 2024-07-13 RX ADMIN — QUETIAPINE FUMARATE 200 MG: 200 TABLET ORAL at 21:27

## 2024-07-13 RX ADMIN — DIVALPROEX SODIUM 500 MG: 500 TABLET, DELAYED RELEASE ORAL at 14:57

## 2024-07-13 RX ADMIN — DIVALPROEX SODIUM 500 MG: 500 TABLET, DELAYED RELEASE ORAL at 09:25

## 2024-07-13 RX ADMIN — DIVALPROEX SODIUM 500 MG: 500 TABLET, DELAYED RELEASE ORAL at 21:27

## 2024-07-13 RX ADMIN — LISINOPRIL 20 MG: 20 TABLET ORAL at 09:24

## 2024-07-13 RX ADMIN — BENZTROPINE MESYLATE 1 MG: 1 TABLET ORAL at 21:27

## 2024-07-13 RX ADMIN — BENZTROPINE MESYLATE 1 MG: 1 TABLET ORAL at 09:25

## 2024-07-13 RX ADMIN — PIOGLITAZONE 30 MG: 15 TABLET ORAL at 09:25

## 2024-07-13 RX ADMIN — MONTELUKAST 10 MG: 10 TABLET, FILM COATED ORAL at 21:27

## 2024-07-13 RX ADMIN — PROPRANOLOL HYDROCHLORIDE 60 MG: 60 CAPSULE, EXTENDED RELEASE ORAL at 09:25

## 2024-07-13 RX ADMIN — HALOPERIDOL 5 MG: 5 TABLET ORAL at 23:23

## 2024-07-13 RX ADMIN — HALOPERIDOL 5 MG: 5 TABLET ORAL at 09:31

## 2024-07-13 RX ADMIN — PANTOPRAZOLE SODIUM 40 MG: 40 TABLET, DELAYED RELEASE ORAL at 06:38

## 2024-07-13 RX ADMIN — TAMSULOSIN HYDROCHLORIDE 0.4 MG: 0.4 CAPSULE ORAL at 09:30

## 2024-07-13 RX ADMIN — CHOLECALCIFEROL TAB 125 MCG (5000 UNIT) 5000 UNITS: 125 TAB at 09:25

## 2024-07-13 RX ADMIN — Medication 3 MG: at 21:27

## 2024-07-13 RX ADMIN — GABAPENTIN 200 MG: 100 CAPSULE ORAL at 09:24

## 2024-07-13 RX ADMIN — HYDROXYZINE PAMOATE 50 MG: 50 CAPSULE ORAL at 09:28

## 2024-07-13 RX ADMIN — GABAPENTIN 200 MG: 100 CAPSULE ORAL at 21:27

## 2024-07-13 RX ADMIN — HYDROXYZINE PAMOATE 50 MG: 50 CAPSULE ORAL at 22:29

## 2024-07-13 RX ADMIN — ATORVASTATIN CALCIUM 20 MG: 40 TABLET, FILM COATED ORAL at 09:24

## 2024-07-13 RX ADMIN — GABAPENTIN 200 MG: 100 CAPSULE ORAL at 14:57

## 2024-07-13 RX ADMIN — FLUTICASONE PROPIONATE 1 SPRAY: 50 SPRAY, METERED NASAL at 09:26

## 2024-07-13 ASSESSMENT — PAIN SCALES - GENERAL
PAINLEVEL_OUTOF10: 0
PAINLEVEL_OUTOF10: 0

## 2024-07-13 NOTE — GROUP NOTE
Group Therapy Note    Date: 7/13/2024  Start Time: 0930  End Time:  0950  Number of Participants: 14    Type of Group: Community Meeting    Wellness Binder Information  Module Name:  Community Meeting      Patient's Goal:  Patient will be oriented to the unit including but not limited expectations, staff, programming schedule.  Patient will be able to ID expectations of treatment.     Notes: Patient was a participant in community meeting, and was updated on expectations of the unit, staffing, programming schedule, and acclimated to their environment.    Patient shared goal for today as \"keep occupied\"    Status After Intervention:  Improved    Participation Level: Active Listener and Interactive    Participation Quality: Appropriate and Sharing      Speech:  normal      Thought Process/Content: Logical      Affective Functioning: Congruent      Mood: anxious      Level of consciousness:  Alert and Attentive      Response to Learning: Able to verbalize current knowledge/experience, Able to verbalize/acknowledge new learning, and Progressing to goal      Endings: None Reported    Modes of Intervention: Exploration, Clarifying, and Problem-solving      Discipline Responsible: Recreational Therapist      Signature:  LALY Lundy

## 2024-07-13 NOTE — PLAN OF CARE
Problem: Pain  Goal: Verbalizes/displays adequate comfort level or baseline comfort level  Outcome: Progressing     Problem: Anxiety  Goal: Will report anxiety at manageable levels  Description: INTERVENTIONS:  1. Administer medication as ordered  2. Teach and rehearse alternative coping skills  3. Provide emotional support with 1:1 interaction with staff  7/13/2024 0546 by Denise Fagan RN  Outcome: Progressing  7/12/2024 2237 by Douglas Rollins RN  Outcome: Progressing     Problem: Coping  Goal: Pt/Family able to verbalize concerns and demonstrate effective coping strategies  Description: INTERVENTIONS:  1. Assist patient/family to identify coping skills, available support systems and cultural and spiritual values  2. Provide emotional support, including active listening and acknowledgement of concerns of patient and caregivers  3. Reduce environmental stimuli, as able  4. Instruct patient/family in relaxation techniques, as appropriate  5. Assess for spiritual pain/suffering and initiate Spiritual Care, Psychosocial Clinical Specialist consults as needed  7/13/2024 0546 by Denise Fagan RN  Outcome: Progressing  7/12/2024 2237 by Douglas Rollins RN  Outcome: Progressing     Problem: Decision Making  Goal: Pt/Family able to effectively weigh alternatives and participate in decision making related to treatment and care  Description: INTERVENTIONS:  1. Determine when there are differences between patient's view, family's view, and healthcare provider's view of condition  2. Facilitate patient and family articulation of goals for care  3. Help patient and family identify pros/cons of alternative solutions  4. Provide information as requested by patient/family  5. Respect patient/family right to receive or not to receive information  6. Serve as a liaison between patient and family and health care team  7. Initiate Consults from Ethics, Palliative Care or initiate Family Care Conference as is appropriate  7/13/2024  0546 by Rylan, Denise L, RN  Outcome: Progressing  7/12/2024 2237 by Douglas Rollins RN  Outcome: Progressing     Problem: Depression/Self Harm  Goal: Effect of psychiatric condition will be minimized and patient will be protected from self harm  Description: INTERVENTIONS:  1. Assess impact of patient's symptoms on level of functioning, self care needs and offer support as indicated  2. Assess patient/family knowledge of depression, impact on illness and need for teaching  3. Provide emotional support, presence and reassurance  4. Assess for possible suicidal thoughts or ideation. If patient expresses suicidal thoughts or statements do not leave alone, initiate Suicide Precautions, move to a room close to the nursing station and obtain sitter  5. Initiate consults as appropriate with Mental Health Professional, Spiritual Care, Psychosocial CNS, and consider a recommendation to the LIP for a Psychiatric Consultation  7/13/2024 0546 by Denise Fagan RN  Outcome: Progressing  7/12/2024 2237 by Douglas Rollins RN  Outcome: Progressing     Problem: Involuntary Admit  Goal: Will cooperate with staff recommendations and doctor's orders and will demonstrate appropriate behavior  Description: INTERVENTIONS:  1. Treat underlying conditions and offer medication as ordered  2. Educate regarding involuntary admission procedures and rules  3. Contain excessive/inappropriate behavior per unit and hospital policies  7/13/2024 0546 by Denise Fagan RN  Outcome: Progressing  7/12/2024 2237 by Douglas Rollins RN  Outcome: Progressing

## 2024-07-13 NOTE — PLAN OF CARE
Problem: Anxiety  Goal: Will report anxiety at manageable levels  Description: INTERVENTIONS:  1. Administer medication as ordered  2. Teach and rehearse alternative coping skills  3. Provide emotional support with 1:1 interaction with staff  7/12/2024 2237 by Douglas Rollins RN  Outcome: Progressing     Problem: Coping  Goal: Pt/Family able to verbalize concerns and demonstrate effective coping strategies  Description: INTERVENTIONS:  1. Assist patient/family to identify coping skills, available support systems and cultural and spiritual values  2. Provide emotional support, including active listening and acknowledgement of concerns of patient and caregivers  3. Reduce environmental stimuli, as able  4. Instruct patient/family in relaxation techniques, as appropriate  5. Assess for spiritual pain/suffering and initiate Spiritual Care, Psychosocial Clinical Specialist consults as needed  7/12/2024 2237 by Douglas Rollins RN  Outcome: Progressing     Problem: Decision Making  Goal: Pt/Family able to effectively weigh alternatives and participate in decision making related to treatment and care  Description: INTERVENTIONS:  1. Determine when there are differences between patient's view, family's view, and healthcare provider's view of condition  2. Facilitate patient and family articulation of goals for care  3. Help patient and family identify pros/cons of alternative solutions  4. Provide information as requested by patient/family  5. Respect patient/family right to receive or not to receive information  6. Serve as a liaison between patient and family and health care team  7. Initiate Consults from Ethics, Palliative Care or initiate Family Care Conference as is appropriate  7/12/2024 2237 by Douglas Rollins RN  Outcome: Progressing     Problem: Depression/Self Harm  Goal: Effect of psychiatric condition will be minimized and patient will be protected from self harm  Description: INTERVENTIONS:  1. Assess impact of  patient's symptoms on level of functioning, self care needs and offer support as indicated  2. Assess patient/family knowledge of depression, impact on illness and need for teaching  3. Provide emotional support, presence and reassurance  4. Assess for possible suicidal thoughts or ideation. If patient expresses suicidal thoughts or statements do not leave alone, initiate Suicide Precautions, move to a room close to the nursing station and obtain sitter  5. Initiate consults as appropriate with Mental Health Professional, Spiritual Care, Psychosocial CNS, and consider a recommendation to the LIP for a Psychiatric Consultation  7/12/2024 2237 by Douglas Rollins, RN  Outcome: Progressing     Problem: Involuntary Admit  Goal: Will cooperate with staff recommendations and doctor's orders and will demonstrate appropriate behavior  Description: INTERVENTIONS:  1. Treat underlying conditions and offer medication as ordered  2. Educate regarding involuntary admission procedures and rules  3. Contain excessive/inappropriate behavior per unit and hospital policies  7/12/2024 2237 by Douglas Rollins, RN  Outcome: Progressing

## 2024-07-13 NOTE — PLAN OF CARE
Patient is alert and oriented x 4.  Denies pain.  No noted signs or symptoms of distress.   Denies SI/HI, or thoughts of self harm when asked.  Does endorse A/H, states \"it tells me to do things and what to say.\"  Rates Anxiety at 8/10 and Depression at 4/10.    Attended on unit groups today, reclusive to self but out on unit.   Medication compliant.     Pleasant, polite, and cooperative.  Agitated this AM and requested PRN medications, patient reports this evening that the A/H is \"less, not as loud.\"  States, \"I think that shot helped.\"  Affect is Flat.  Poor eye contact but improved as day went on.  Appropriate with peers and staff when out on unit.    Appears well groomed/neat, room Is clean.    Described his mood as \"agitated\" this AM, indicates this was due to \"I haven't shaved.\"  Up for all meals.    Will continue to monitor for safety q 15 minute safety rounds and environmental assessments.

## 2024-07-13 NOTE — GROUP NOTE
Group Therapy Note    Date: 7/13/2024    Group Start Time: 1500  Group End Time: 1540  Group Topic: Cognitive Skills    [facility]    Sabra Rao MSW, LSW        Group Therapy Note    Attendees: 15       Patient's Goal:  Pt will be able to discuss coping skills for depression and identify the coping skills that they currently utilize or would like to utilize.      Notes:  Pt was an active participant in group discussion.     Status After Intervention:  Improved    Participation Level: Active Listener and Minimal    Participation Quality: Appropriate and Attentive      Speech:  normal      Thought Process/Content: Linear      Affective Functioning: Blunted      Mood: euthymic      Level of consciousness:  Alert, Oriented x4, and Attentive      Response to Learning: Resistant      Endings: None Reported    Modes of Intervention: Education, Support, Socialization, Exploration, Clarifying, and Problem-solving      Discipline Responsible: /Counselor      Signature:  GERSON Ford LSW

## 2024-07-13 NOTE — GROUP NOTE
Group Therapy Note    Date: 7/13/2024    Group Start Time: 1400  Group End Time: 1450  Group Topic: Recreational    [facility]    Josi Kitchen CTRS    Date: 7/13/2024  Start Time: 1400  End Time:  1450  Number of Participants: 15    Type of Group: Recreational    Wellness Binder Information  Module Name:  Scattegories    Patient's Goal:  To increase socialization amongst peers.  To increase knowledge of potential new leisure interests.  To improve overall wellbeing/mood.    Notes:  CTRS facilitated the game InfiKno. Patient was an active participant in game and was observed smiling and interacting appropriately with peers.      Status After Intervention:  Improved    Participation Level: Active Listener and Interactive    Participation Quality: Appropriate and Attentive      Speech:  normal      Thought Process/Content: Logical      Affective Functioning: Congruent      Mood: euthymic      Level of consciousness:  Alert and Attentive      Response to Learning: Able to verbalize current knowledge/experience, Able to verbalize/acknowledge new learning, and Progressing to goal      Endings: None Reported    Modes of Intervention: Socialization, Exploration, and Activity      Discipline Responsible: Recreational Therapist      Signature:  LALY Lundy    Group Therapy Note    Attendees: 15

## 2024-07-13 NOTE — GROUP NOTE
Date: 7/13/2024  Start Time: 0950  End Time:  1025  Number of Participants: 14    Type of Group: Psychoeducation    Wellness Binder Information  Module Name:  Ask it Basket    Patient's Goal:  To improve communication/social interactions amongst peers.     Notes:  CTRS facilitated the group ask it basket.  Patient was an active participant in discussion and was receptive of information provide    Status After Intervention:  Improved    Participation Level: Active Listener    Participation Quality: Appropriate and Attentive      Speech:  normal      Thought Process/Content: Logical      Affective Functioning: Congruent      Mood: anxious      Level of consciousness:  Alert and Attentive      Response to Learning: Able to verbalize current knowledge/experience, Able to verbalize/acknowledge new learning, Able to retain information, and Progressing to goal      Endings: None Reported    Modes of Intervention: Education, Support, Socialization, and Exploration      Discipline Responsible: Recreational Therapist      Signature:  LALY Lundy

## 2024-07-13 NOTE — PROGRESS NOTES
BEHAVIORAL HEALTH FOLLOW-UP NOTE     7/13/2024     Patient was seen and examined in person, Chart reviewed   Patient's case discussed with staff/team    Chief Complaint: \" Haldol and Ativan are what works the best for me\"    Interim History:     Juvenal is out on the unit he has been observed attending groups mostly from the periphery.  He states that he has been feeling anxious and paranoid and has been hearing voices again.  He states that Haldol and Ativan are the best combination that works for him.  He does appear anxious and preoccupied low threshold for stimulation.  Speech is somewhat pressured during conversation.  Has limited insight into his mental illness, he does endorse an increase in paranoia and having difficulty at the group home.  Denies suicidal ideation intent or plan.  Appetite:   [x] Normal/Unchanged  [] Increased  [] Decreased      Sleep:       [] Normal/Unchanged  [x] Fair       [] Poor              Energy:    [x] Normal/Unchanged  [] Increased  [] Decreased        SI [] Present  [x] Absent    HI  []Present  [x] Absent     Aggression:  [] yes  [x] no    Patient is [x] able  [] unable to CONTRACT FOR SAFETY     PAST MEDICAL/PSYCHIATRIC HISTORY:   Past Medical History:   Diagnosis Date    Anxiety     Arthritis     Hyperlipidemia     Hypertension     Impulse control disease     Schizo affective schizophrenia (HCC)        FAMILY/SOCIAL HISTORY:  Family History   Problem Relation Age of Onset    No Known Problems Mother     No Known Problems Sister      Social History     Socioeconomic History    Marital status: Single     Spouse name: Not on file    Number of children: Not on file    Years of education: Not on file    Highest education level: Not on file   Occupational History     Employer: NOT EMPLOYED   Tobacco Use    Smoking status: Every Day     Current packs/day: 1.50     Average packs/day: 1.5 packs/day for 24.4 years (36.6 ttl pk-yrs)     Types: Cigarettes     Start date: 2/18/2000

## 2024-07-13 NOTE — BH NOTE
Patient given Aristada 882mg IM to Right glute area at this time.  Patient tolerated well with no complaints.    LOT [id number]  EXP [id number]

## 2024-07-14 PROCEDURE — 6370000000 HC RX 637 (ALT 250 FOR IP): Performed by: PSYCHIATRY & NEUROLOGY

## 2024-07-14 PROCEDURE — 99232 SBSQ HOSP IP/OBS MODERATE 35: CPT | Performed by: NURSE PRACTITIONER

## 2024-07-14 PROCEDURE — 6370000000 HC RX 637 (ALT 250 FOR IP): Performed by: NURSE PRACTITIONER

## 2024-07-14 PROCEDURE — 1240000000 HC EMOTIONAL WELLNESS R&B

## 2024-07-14 RX ADMIN — FLUTICASONE PROPIONATE 1 SPRAY: 50 SPRAY, METERED NASAL at 08:18

## 2024-07-14 RX ADMIN — BENZTROPINE MESYLATE 1 MG: 1 TABLET ORAL at 08:20

## 2024-07-14 RX ADMIN — DIVALPROEX SODIUM 500 MG: 500 TABLET, DELAYED RELEASE ORAL at 21:13

## 2024-07-14 RX ADMIN — GABAPENTIN 200 MG: 100 CAPSULE ORAL at 14:00

## 2024-07-14 RX ADMIN — QUETIAPINE FUMARATE 300 MG: 200 TABLET ORAL at 21:13

## 2024-07-14 RX ADMIN — GABAPENTIN 200 MG: 100 CAPSULE ORAL at 08:19

## 2024-07-14 RX ADMIN — DIVALPROEX SODIUM 500 MG: 500 TABLET, DELAYED RELEASE ORAL at 14:00

## 2024-07-14 RX ADMIN — MONTELUKAST 10 MG: 10 TABLET, FILM COATED ORAL at 22:10

## 2024-07-14 RX ADMIN — HYDROXYZINE PAMOATE 50 MG: 50 CAPSULE ORAL at 10:19

## 2024-07-14 RX ADMIN — Medication 3 MG: at 23:47

## 2024-07-14 RX ADMIN — BENZTROPINE MESYLATE 1 MG: 1 TABLET ORAL at 21:13

## 2024-07-14 RX ADMIN — PROPRANOLOL HYDROCHLORIDE 60 MG: 60 CAPSULE, EXTENDED RELEASE ORAL at 08:20

## 2024-07-14 RX ADMIN — TAMSULOSIN HYDROCHLORIDE 0.4 MG: 0.4 CAPSULE ORAL at 08:19

## 2024-07-14 RX ADMIN — CHOLECALCIFEROL TAB 125 MCG (5000 UNIT) 5000 UNITS: 125 TAB at 08:20

## 2024-07-14 RX ADMIN — PIOGLITAZONE 30 MG: 15 TABLET ORAL at 08:19

## 2024-07-14 RX ADMIN — LISINOPRIL 20 MG: 20 TABLET ORAL at 08:20

## 2024-07-14 RX ADMIN — ATORVASTATIN CALCIUM 20 MG: 40 TABLET, FILM COATED ORAL at 08:19

## 2024-07-14 RX ADMIN — DIVALPROEX SODIUM 500 MG: 500 TABLET, DELAYED RELEASE ORAL at 08:19

## 2024-07-14 RX ADMIN — GABAPENTIN 200 MG: 100 CAPSULE ORAL at 21:13

## 2024-07-14 RX ADMIN — PANTOPRAZOLE SODIUM 40 MG: 40 TABLET, DELAYED RELEASE ORAL at 06:55

## 2024-07-14 ASSESSMENT — PAIN SCALES - GENERAL: PAINLEVEL_OUTOF10: 0

## 2024-07-14 NOTE — PLAN OF CARE
Denies suicidal ideations or thoughts of self harm.  Denies homicidal ideations or thoughts to hurt others.  Denies auditory and visual hallucinations.  Fair eye contact, flat affect.  Continues to display symptoms of anxiety including staff splitting, requiring frequent reassurance, redirection, and education.   Attended on unit groups thus far today.  Medication compliant.  Up for meals.

## 2024-07-14 NOTE — GROUP NOTE
Group Therapy Note    Date: 7/14/2024  Start Time: 0930  End Time:  0950  Number of Participants: 15    Type of Group: Community Meeting    Wellness Binder Information  Module Name:  Community Meeting      Patient's Goal:  Patient will be oriented to the unit including but not limited expectations, staff, programming schedule.  Patient will be able to ID expectations of treatment.     Notes: Patient was a participant in community meeting, and was updated on expectations of the unit, staffing, programming schedule, and acclimated to their environment.    Patient shared goal for today as \"maintain going to groups\"    Status After Intervention:  Improved    Participation Level: Active Listener    Participation Quality: Appropriate and Attentive      Speech:  normal      Thought Process/Content: Logical      Affective Functioning: Congruent      Mood: anxious      Level of consciousness:  Alert and Attentive      Response to Learning: Able to verbalize current knowledge/experience, Able to verbalize/acknowledge new learning, and Progressing to goal      Endings: None Reported    Modes of Intervention: Exploration, Clarifying, and Problem-solving      Discipline Responsible: Recreational Therapist      Signature:  LALY Lundy

## 2024-07-14 NOTE — PROGRESS NOTES
BEHAVIORAL HEALTH FOLLOW-UP NOTE     7/14/2024     Patient was seen and examined in person, Chart reviewed   Patient's case discussed with staff/team    Chief Complaint: \" I am doing better today, I am not hearing any voices\"    Interim History:     Juvenal is out on the unit he has been observed attending groups mostly from the periphery.  He states that he is feeling a lot better today and no longer hearing any voices.  He is more relaxed on this encounter makes good eye contact.  Speech is somewhat pressured during conversation.  Has limited insight into his mental illness, he does endorse an increase in paranoia and having difficulty at the group home.  Denies suicidal ideation intent or plan.  Denying any auditory or visual hallucinations today  Appetite:   [x] Normal/Unchanged  [] Increased  [] Decreased      Sleep:       [] Normal/Unchanged  [x] Fair       [] Poor              Energy:    [x] Normal/Unchanged  [] Increased  [] Decreased        SI [] Present  [x] Absent    HI  []Present  [x] Absent     Aggression:  [] yes  [x] no    Patient is [x] able  [] unable to CONTRACT FOR SAFETY     PAST MEDICAL/PSYCHIATRIC HISTORY:   Past Medical History:   Diagnosis Date    Anxiety     Arthritis     Hyperlipidemia     Hypertension     Impulse control disease     Schizo affective schizophrenia (HCC)        FAMILY/SOCIAL HISTORY:  Family History   Problem Relation Age of Onset    No Known Problems Mother     No Known Problems Sister      Social History     Socioeconomic History    Marital status: Single     Spouse name: Not on file    Number of children: Not on file    Years of education: Not on file    Highest education level: Not on file   Occupational History     Employer: NOT EMPLOYED   Tobacco Use    Smoking status: Every Day     Current packs/day: 1.50     Average packs/day: 1.5 packs/day for 24.4 years (36.6 ttl pk-yrs)     Types: Cigarettes     Start date: 2/18/2000    Smokeless tobacco: Never   Vaping Use

## 2024-07-14 NOTE — PLAN OF CARE
Pt out in the common area, watching TV. Pt denies SI, HI and AVH. Pt flat and blunt, but cooperative. Pt later came to the nurses station stating that he is used to taking Zoloft and that he is not getting it. Pt educated that Zoloft has not been prescribed here and was offered Vistaril, which he accepted. Pt was cooperative and returned to his room to rest.    Problem: Anxiety  Goal: Will report anxiety at manageable levels  Description: INTERVENTIONS:  1. Administer medication as ordered  2. Teach and rehearse alternative coping skills  3. Provide emotional support with 1:1 interaction with staff  7/13/2024 2237 by Antonio Jacob, RN  Outcome: Progressing     Problem: Coping  Goal: Pt/Family able to verbalize concerns and demonstrate effective coping strategies  Description: INTERVENTIONS:  1. Assist patient/family to identify coping skills, available support systems and cultural and spiritual values  2. Provide emotional support, including active listening and acknowledgement of concerns of patient and caregivers  3. Reduce environmental stimuli, as able  4. Instruct patient/family in relaxation techniques, as appropriate  5. Assess for spiritual pain/suffering and initiate Spiritual Care, Psychosocial Clinical Specialist consults as needed  7/13/2024 2237 by Antonio Jacob, RN  Outcome: Progressing     Problem: Decision Making  Goal: Pt/Family able to effectively weigh alternatives and participate in decision making related to treatment and care  Description: INTERVENTIONS:  1. Determine when there are differences between patient's view, family's view, and healthcare provider's view of condition  2. Facilitate patient and family articulation of goals for care  3. Help patient and family identify pros/cons of alternative solutions  4. Provide information as requested by patient/family  5. Respect patient/family right to receive or not to receive information  6. Serve as a liaison between patient and  family and health care team  7. Initiate Consults from Ethics, Palliative Care or initiate Family Care Conference as is appropriate  7/13/2024 2237 by Antonio Jacob, RN  Outcome: Progressing

## 2024-07-14 NOTE — GROUP NOTE
Date: 7/14/2024  Start Time: 0950  End Time:  1020  Number of Participants: 15    Type of Group: Psychoeducation    Wellness Binder Information  Module Name:  Eliminating toxic influences and hold on let go    Patient's Goal:  Patient will be able to ID different toxic influences that can affect one's behavior and mood.  Patient will be able to ID traits they would like to hold on to moving forward and things we need to let go of.      Notes:  CTRS discussed toxic traits and eliminating different negative traits out of one's life.  CTRS facilitated hold on let go activity and patient was an active participant in discussion.  Patient also accepting of handout and sharing of responses to handout.      Status After Intervention:  Improved    Participation Level: Active Listener    Participation Quality: Appropriate and Attentive      Speech:  normal      Thought Process/Content: Logical      Affective Functioning: Congruent      Mood: anxious      Level of consciousness:  Alert and Attentive      Response to Learning: Able to verbalize current knowledge/experience, Able to verbalize/acknowledge new learning, and Progressing to goal      Endings: None Reported    Modes of Intervention: Education, Exploration, Problem-solving, and Activity      Discipline Responsible: Recreational Therapist      Signature:  LALY Lundy

## 2024-07-14 NOTE — GROUP NOTE
Group Therapy Note    Date: 7/14/2024    Group Start Time: 1100  Group End Time: 1140  Group Topic: Cognitive Skills    [facility]    Sabra Rao MSW, LSW        Group Therapy Note    Attendees: 15       Patient's Goal:  Pt will be able to discuss tips for setting boundaries and learn ways to set new boundaries.     Notes:  Pt was an active participant in group discussion.     Status After Intervention:  Unchanged    Participation Level: Active Listener    Participation Quality: Appropriate      Speech:  normal      Thought Process/Content: Linear      Affective Functioning: Blunted      Mood: euthymic      Level of consciousness:  Alert and Attentive      Response to Learning: Resistant      Endings: None Reported    Modes of Intervention: Education, Support, Socialization, Exploration, Clarifying, and Problem-solving      Discipline Responsible: /Counselor      Signature:  GERSON Ford LSW

## 2024-07-14 NOTE — BH NOTE
Pt up to nurses station, c/o being \"agitated.\" Pt states, \"I can't stop moving in bed, and I'm going to go off.\" Pt given pen Vistaril. Pt came back up about an hour later and continued to c/o agitation. Pt given Haldol and was then able to go back to rest in room.

## 2024-07-14 NOTE — CARE COORDINATION
RN informed SW that pt requested to speak with SW. SW met with pt in his room. Pt states that he is not happy with his current medication regimen and wants to be put back on the medications he was taking prior to admission. SW discussed with pt that since he was admitted, it may be possible that he was not on the best medications. SW discussed with him that provider may want to try new medications to see how pt responds in order to address pt symptoms. Pt is understanding of this and willing to try new medications, but reports to SW that he is still feeling anxious and agitated. SW offered empathy and support to pt and encouraged him to speak with RN or provider if he continues to not notice improvement in symptoms. He verbalized understanding. RN updated.

## 2024-07-14 NOTE — PROGRESS NOTES
Patient declined verbal invitation to the following group    Water Color Art     Patient will continue to be provided with opportunities to enhance leisure skills/interests and/or coping mechanisms.

## 2024-07-15 LAB
DATE LAST DOSE: NORMAL
TME LAST DOSE: NORMAL H
VALPROATE SERPL-MCNC: 60 UG/ML (ref 50–100)
VANCOMYCIN DOSE: NORMAL MG

## 2024-07-15 PROCEDURE — 6370000000 HC RX 637 (ALT 250 FOR IP): Performed by: PSYCHIATRY & NEUROLOGY

## 2024-07-15 PROCEDURE — 99232 SBSQ HOSP IP/OBS MODERATE 35: CPT | Performed by: NURSE PRACTITIONER

## 2024-07-15 PROCEDURE — 6370000000 HC RX 637 (ALT 250 FOR IP): Performed by: NURSE PRACTITIONER

## 2024-07-15 PROCEDURE — 36415 COLL VENOUS BLD VENIPUNCTURE: CPT

## 2024-07-15 PROCEDURE — 80164 ASSAY DIPROPYLACETIC ACD TOT: CPT

## 2024-07-15 PROCEDURE — 1240000000 HC EMOTIONAL WELLNESS R&B

## 2024-07-15 RX ORDER — GABAPENTIN 100 MG/1
200 CAPSULE ORAL 3 TIMES DAILY
Qty: 60 CAPSULE | Refills: 0
Start: 2024-07-15 | End: 2024-07-25

## 2024-07-15 RX ORDER — NICOTINE 21 MG/24HR
1 PATCH, TRANSDERMAL 24 HOURS TRANSDERMAL DAILY
Qty: 30 PATCH | Refills: 0
Start: 2024-07-16 | End: 2024-08-15

## 2024-07-15 RX ADMIN — PROPRANOLOL HYDROCHLORIDE 60 MG: 60 CAPSULE, EXTENDED RELEASE ORAL at 09:14

## 2024-07-15 RX ADMIN — TAMSULOSIN HYDROCHLORIDE 0.4 MG: 0.4 CAPSULE ORAL at 09:14

## 2024-07-15 RX ADMIN — FLUTICASONE PROPIONATE 1 SPRAY: 50 SPRAY, METERED NASAL at 09:19

## 2024-07-15 RX ADMIN — DIVALPROEX SODIUM 500 MG: 500 TABLET, DELAYED RELEASE ORAL at 13:50

## 2024-07-15 RX ADMIN — QUETIAPINE FUMARATE 300 MG: 200 TABLET ORAL at 21:09

## 2024-07-15 RX ADMIN — DIVALPROEX SODIUM 500 MG: 500 TABLET, DELAYED RELEASE ORAL at 09:14

## 2024-07-15 RX ADMIN — PIOGLITAZONE 30 MG: 15 TABLET ORAL at 09:14

## 2024-07-15 RX ADMIN — GABAPENTIN 200 MG: 100 CAPSULE ORAL at 21:09

## 2024-07-15 RX ADMIN — LISINOPRIL 20 MG: 20 TABLET ORAL at 09:15

## 2024-07-15 RX ADMIN — CHOLECALCIFEROL TAB 125 MCG (5000 UNIT) 5000 UNITS: 125 TAB at 09:15

## 2024-07-15 RX ADMIN — PANTOPRAZOLE SODIUM 40 MG: 40 TABLET, DELAYED RELEASE ORAL at 06:57

## 2024-07-15 RX ADMIN — DIVALPROEX SODIUM 500 MG: 500 TABLET, DELAYED RELEASE ORAL at 21:09

## 2024-07-15 RX ADMIN — ATORVASTATIN CALCIUM 20 MG: 40 TABLET, FILM COATED ORAL at 09:15

## 2024-07-15 RX ADMIN — BENZTROPINE MESYLATE 1 MG: 1 TABLET ORAL at 21:09

## 2024-07-15 RX ADMIN — MONTELUKAST 10 MG: 10 TABLET, FILM COATED ORAL at 21:09

## 2024-07-15 RX ADMIN — GABAPENTIN 200 MG: 100 CAPSULE ORAL at 13:50

## 2024-07-15 RX ADMIN — BENZTROPINE MESYLATE 1 MG: 1 TABLET ORAL at 09:15

## 2024-07-15 RX ADMIN — GABAPENTIN 200 MG: 100 CAPSULE ORAL at 09:15

## 2024-07-15 RX ADMIN — HYDROXYZINE PAMOATE 50 MG: 50 CAPSULE ORAL at 21:09

## 2024-07-15 NOTE — PROGRESS NOTES
BEHAVIORAL HEALTH FOLLOW-UP NOTE     7/15/2024     Patient was seen and examined in person, Chart reviewed   Patient's case discussed with staff/team    Chief Complaint: \" I am going to find some social groups for coping and support\"    Interim History:   I saw patient today in treatment team.  He states that he plans to find some coping and support groups to help him and give him support.  He states that he would like to find a new group home but he states that not even a priority he wants to find the support groups to help him.  He states he is feeling better than when he came in and says medications are working well for him.  He denies suicidal ideations intent or plan denies auditory or visual hallucinations eating well sleeping well and no neurovegetative signs of depression and no overt or covert side psychosis he is appreciate that he is receiving here.            Appetite:   [x] Normal/Unchanged  [] Increased  [] Decreased      Sleep:       [] Normal/Unchanged  [x] Fair       [] Poor              Energy:    [x] Normal/Unchanged  [] Increased  [] Decreased        SI [] Present  [x] Absent    HI  []Present  [x] Absent     Aggression:  [] yes  [x] no    Patient is [x] able  [] unable to CONTRACT FOR SAFETY     PAST MEDICAL/PSYCHIATRIC HISTORY:   Past Medical History:   Diagnosis Date    Anxiety     Arthritis     Hyperlipidemia     Hypertension     Impulse control disease     Schizo affective schizophrenia (HCC)        FAMILY/SOCIAL HISTORY:  Family History   Problem Relation Age of Onset    No Known Problems Mother     No Known Problems Sister      Social History     Socioeconomic History    Marital status: Single     Spouse name: Not on file    Number of children: Not on file    Years of education: Not on file    Highest education level: Not on file   Occupational History     Employer: NOT EMPLOYED   Tobacco Use    Smoking status: Every Day     Current packs/day: 1.50     Average packs/day: 1.5 packs/day for  Daily, NishantmelvinaRyleei B, APRN - CNP, 30 mg at 07/15/24 0914    propranolol (INDERAL LA) extended release capsule 60 mg, 60 mg, Oral, Daily, Patricelick, Shaniqua B, APRN - CNP, 60 mg at 07/15/24 0914    gabapentin (NEURONTIN) capsule 200 mg, 200 mg, Oral, TID, Dellick, Shaniqua B, APRN - CNP, 200 mg at 07/15/24 0915    LORazepam (ATIVAN) tablet 1 mg, 1 mg, Oral, Once, Baltazar uBrr C, DO      Examination:  /73   Pulse 89   Temp 97.1 °F (36.2 °C) (Temporal)   Resp 15   Ht 1.829 m (6')   Wt 113.4 kg (250 lb)   SpO2 94%   BMI 33.91 kg/m²   Gait - steady  Medication side effects(SE): None reported    Mental Status Examination:    Level of consciousness:  within normal limits   Appearance:  fair grooming and fair hygiene  Behavior/Motor:  no abnormalities noted  Attitude toward examiner:  cooperative  Speech:  spontaneous, normal rate and normal volume  Mood: \" I feel paranoid and anxious.\"  Affect: Paranoid and anxious  congruent with stated mood  Thought processes: Linear without  flight of ideas or loose associations  Thought content: Paranoid with auditory hallucinations   denies SI/HI intent or plan   Language: able to name objects and repeate phrases  Remote Memory: intact  Recent   Memory: intact  Cognition:  oriented to person, place, and time   Fund of Knowledge: Vocabulary intact, pt is aware of current events and past history  Attention and Concentration intact  Insight judgment limited    ASSESSMENT:   Patient symptoms are:  [] Well controlled  [] Improving  [] Worsening  [x] No change      Diagnosis: =  Principal Problem:    Schizoaffective disorder, bipolar type (HCC)  Resolved Problems:    * No resolved hospital problems. *      LABS:    No results for input(s): \"WBC\", \"HGB\", \"PLT\" in the last 72 hours.    No results for input(s): \"NA\", \"K\", \"CL\", \"CO2\", \"BUN\", \"CREATININE\", \"GLUCOSE\" in the last 72 hours.    No results for input(s): \"BILITOT\", \"ALKPHOS\", \"AST\", \"ALT\" in the last 72 hours.    Lab Results

## 2024-07-15 NOTE — PLAN OF CARE
Pt out in the common area, minimally social with peers and watching TV. Pt denies SI, HI and AVH. Pt asking about medications and why he isn't getting Zoloft. Pt states, \"I think we talked about that last night, but I was haider tired and I don't remember.\" Pt educated on the medications and why we do not prescribe certain medications. Pt states he understands and appreciates the information. Pt went to his room to rest.    Problem: Anxiety  Goal: Will report anxiety at manageable levels  Description: INTERVENTIONS:  1. Administer medication as ordered  2. Teach and rehearse alternative coping skills  3. Provide emotional support with 1:1 interaction with staff  7/14/2024 2211 by Antonio aJcob, RN  Outcome: Progressing     Problem: Coping  Goal: Pt/Family able to verbalize concerns and demonstrate effective coping strategies  Description: INTERVENTIONS:  1. Assist patient/family to identify coping skills, available support systems and cultural and spiritual values  2. Provide emotional support, including active listening and acknowledgement of concerns of patient and caregivers  3. Reduce environmental stimuli, as able  4. Instruct patient/family in relaxation techniques, as appropriate  5. Assess for spiritual pain/suffering and initiate Spiritual Care, Psychosocial Clinical Specialist consults as needed  7/14/2024 2211 by Antonio Jacob, RN  Outcome: Progressing     Problem: Decision Making  Goal: Pt/Family able to effectively weigh alternatives and participate in decision making related to treatment and care  Description: INTERVENTIONS:  1. Determine when there are differences between patient's view, family's view, and healthcare provider's view of condition  2. Facilitate patient and family articulation of goals for care  3. Help patient and family identify pros/cons of alternative solutions  4. Provide information as requested by patient/family  5. Respect patient/family right to receive or not to

## 2024-07-15 NOTE — CARE COORDINATION
SW met with pt during treatment team. Pt stated that he is feeling better than when he came into the hospital. Pt stated that he is feeling more relaxed and he has been going to groups. Pt stated that when he is discharged he is thinking how to work through finding a new place to live and finding new coping skills such as social groups. Pt stated that he wants to talk to his guardian about his living situation but his main thing is finding things to do outside of the groups. Pt stated that he likes the groups in the hospital, NP discussed IOP and pt is agreeable to this. Pt stated that he just got his MENDOZA and he will continue to go to [facility] for the M[facility]. Pt stated that he uses [employer] for transportation and he will be able to make payments for this. Pt stated that he feels that he is ready to be discharged today.

## 2024-07-15 NOTE — GROUP NOTE
Shared goal for the day as to try not to over think.                                                                       Group Therapy Note    Date: 7/15/2024    Group Start Time: 0930  Group End Time: 0945  Group Topic: Community Meeting    [facility]    Heather Flores CTRS    Type of Group: Community Meeting      Patient's Goal:  Patient will be able to id staffing assignments, expectations of patients, and general information re: floor rules. Will be prompted to share goal for the day.     Notes:  Patient appeared to be an active listener, taking in information presented and was prompted to share goal for the day.    Status After Intervention:  Improved    Participation Level: Active Listener and Interactive    Participation Quality: Appropriate, Attentive, and Sharing      Speech:  normal      Thought Process/Content: Logical      Affective Functioning: Congruent      Mood: euthymic      Level of consciousness:  Alert, Oriented x4, and Attentive      Response to Learning: Able to verbalize current knowledge/experience and Able to verbalize/acknowledge new learning      Endings: None Reported    Modes of Intervention: Education and Support      Discipline Responsible: Psychoeducational Specialist      Signature:  LALY Pineda

## 2024-07-15 NOTE — GROUP NOTE
Group Therapy Note    Date: 7/15/2024    Group Start Time: 0945  Group End Time: 1020  Group Topic: Psychoeducation    [facility]    Heather Flores, CTRS    Date: 7/15/2024  Module Name:  finding happiness:creating the life you want    Patient's Goal:  Pt will be able to id daily steps one can take to increase his/her own happiness.     Notes:  Sharing and engaged in group, willing to share when prompted. Accepting of handout.      Status After Intervention:  Improved    Participation Level: Active Listener and Interactive    Participation Quality: Appropriate, Attentive, and Sharing      Speech:  normal      Thought Process/Content: Logical      Affective Functioning: Congruent      Mood: euthymic      Level of consciousness:  Alert, Oriented x4, and Attentive      Response to Learning: Able to verbalize/acknowledge new learning, Able to retain information, and Progressing to goal      Endings: None Reported    Modes of Intervention: Education, Support, Socialization, and Clarifying      Discipline Responsible: Psychoeducational Specialist      Signature:  Heather Flores, CTRS

## 2024-07-15 NOTE — PROGRESS NOTES
Juvenal Andre was ordered dulaglutide (Trulicity) which is a nonformulary medication.  This medication will need to be supplied by the patient as the pharmacy does not carry this non-formulary medication.    If the medication has not been administered by 1400 on the following day from the time the order was placed, a pharmacist will follow-up with the nurse of the patient to assess the capability of the patient to bring in the medication.    If it is determined that the patient cannot supply the medication and it is not available to be dispensed from the pharmacy, the provider will be notified.     Physical Therapy Progress Note  Visit Type: Daily Treatment Note -  Progress Note  Visit: 30  Referring Provider: Brad Kovacs MD  Medical Diagnosis (from order): Diagnosis Information    Diagnosis  724.4 (ICD-9-CM) - M54.16 (ICD-10-CM) - Lumbar radiculopathy         SUBJECTIVE                                                                                                               Reports continued soreness in back which she feels is mostly aggravated by walking. Tolerating increased activity with HEP and gym program but is still frustrated with her fitness  Functional Change: As above  Current Functional Limitations: Pain limiting forward bending activities, easily irritated with sitting, bending activities      OBJECTIVE                                                                                                                     Range of Motion (ROM)   (degrees unless noted; active unless noted; norms in ( ); negative=lacking to 0, positive=beyond 0)  Lumbar:    - Flexion (60-80):  75%  pain     - Extension (25):  100%     - Rotation (30-45):        • Left:  75%         • Right:  75%     - Side Bend (25-35):        • Left:  75%         • Right:  50%          Palpation  Tightness and restriction in bilateral QL, right and left gluteal. Increased sensation noted in left LE. Remains hypersensitive through paralumbar         Ambulation / Gait  Video analysis of gait, left sidebend throughout with heavier step on left. Stride symmetrical, overall reduction in pelvic mobility through gait cycle and has mild forward lean             Treatment     Manual Therapy   DTM to above noted areas of restriction with passive stretch to bilateral hip flexors in prone, long leg distraction     Neuromuscular Re-Education  Review of previous with reinforcement  Continued education regarding sensory overload and central sensitization   Shared results of gait analysis and discussed strategies to improve  Hip flexor stretch  in 1/2 kneel with sidebend stretch bilaterally  Child's pose stretch with sidebend and rotation to address trunk restriction   Review of stability work     Skilled input: verbal instruction/cues, tactile instruction/cues and posture correction    Writer verbally educated and received verbal consent for hand placement, positioning of patient, and techniques to be performed today from patient for clothing adjustments for techniques, therapist position for techniques and hand placement and palpation for techniques as described above and how they are pertinent to the patient's plan of care.    Home Exercise Program  Access Code: [email or web address]  URL: [email or web address]  Date: 02/22/2023  Prepared by: Xochitl Grgic    Exercises  ? Supine Bridge - 1 x daily - 7 x weekly - 3 sets - 10 reps  ? Standing Terminal Knee Extension with Resistance - 1 x daily - 7 x weekly - 3 sets - 10 reps  ? Seated Knee Extension with Resistance - 1 x daily - 7 x weekly - 3 sets - 10 reps  ? Hip Flexor Stretch at Edge of Bed - 1 x daily - 7 x weekly - 3 sets - 10 reps  ? Bird Dog - 1 x daily - 7 x weekly - 1 sets - 10 reps - 5 hold  ? Dead Bug - 1 x daily - 7 x weekly - 1 sets - 10 reps  ? Prone Hip Extension with Plantarflexion - 1 x daily - 7 x weekly - 2 sets - 10 reps  ? Shoulder Extension with Resistance - 1 x daily - 7 x weekly - 3 sets - 10 reps  ? Quadruped Thoracic Rotation - Reach Under - 1 x daily - 7 x weekly - 1 sets - 10 reps  ? Seated Slump Nerve Glide - 1 x daily - 7 x weekly - 1 sets - 10 reps  ? Side Plank on Knees - 1 x daily - 7 x weekly - 1 sets - 3 reps - 20 hold           ASSESSMENT                                                                                                          To date the patient has made gains as expected. not as expected due to Continued issues with central sensitization .  Patient continues to have impairments and functional deficits as noted.  Patient will continue to  benefit from skilled care as outlined.    Patient continues to struggle with pain, some emotional component as well as physical. Would benefit from continued monitoring of progress and advancement of program. See every other week as appropriate and per MD request  Pain after session: not rated.  Education:   - Results of above outlined education: Verbalizes understanding, Demonstrates understanding and Needs reinforcement    PLAN                                                                                                                           Suggestions for next session as indicated: Progress per plan of care, tindeq eval for strength       Therapy procedure time and total treatment time can be found documented on the Time Entry flowsheet

## 2024-07-15 NOTE — CARE COORDINATION
KIARA called pt's legal guardian Roger Lenz [phone] extension [phone] to discuss discharge for tomorrow. Roger stated that the pt will need an insurance ride to get home. KIARA spoke with Roger regarding IOP at the hospital. Roger stated that he is open to the pt going to an IOP program however the pt has to pay for his transportation and it may be hard for him to go 3 times per week. KIARA discussed behavioral health and wellness in [place] and roger feels that this program is more practical for the pt and they also use [employer] for transportation.     KIARA updated pt that he will be getting discharged on 7/16. Pt stated that he normally gets his medications through [facility] and they come in bubble packs and he would like his medications to be sent to this pharmacy. Pt stated that he would like to go to the [place] [facility] as they offer transportation through carts.      [facility]ARA called [facility] to schedule follow up appointments. KIARA scheduled an appointment on 8:30 AM on 7/22 and the pt will also be seen by a medical RN. KIARA was informed that the pt will see a counselor and then a medication provider. KIARA was informed that they do not do MENDOZA's and the pt will be able to continue going to [facility].     [facility] ( [facility]) Monday to Friday 9-12 (Must go 2-4 days a week)   Address: [address] [place] [address]   Phone: [phone]   Fax: [phone]      KIARA called [facility] to schedule follow up medication management services. KIARA scheduled an appointment with Soledad on 7/31 at 3 PM for medication management and on 8/12 at 2 PM for the ARIPiprazole lauroxil (ARISTADA) injection 882 mg .     [facility] [place] [facility]    [address] [place] [address]     Phone: [phone]    Fax [phone]

## 2024-07-15 NOTE — PLAN OF CARE
Problem: Pain  Goal: Verbalizes/displays adequate comfort level or baseline comfort level  Outcome: Progressing     Problem: Anxiety  Goal: Will report anxiety at manageable levels  Description: INTERVENTIONS:  1. Administer medication as ordered  2. Teach and rehearse alternative coping skills  3. Provide emotional support with 1:1 interaction with staff  7/15/2024 0950 by Shanika Welch RN  Outcome: Progressing  7/14/2024 2211 by Antonio Jacob RN  Outcome: Progressing     Problem: Coping  Goal: Pt/Family able to verbalize concerns and demonstrate effective coping strategies  Description: INTERVENTIONS:  1. Assist patient/family to identify coping skills, available support systems and cultural and spiritual values  2. Provide emotional support, including active listening and acknowledgement of concerns of patient and caregivers  3. Reduce environmental stimuli, as able  4. Instruct patient/family in relaxation techniques, as appropriate  5. Assess for spiritual pain/suffering and initiate Spiritual Care, Psychosocial Clinical Specialist consults as needed  7/15/2024 0950 by Shanika Welch RN  Outcome: Progressing  7/14/2024 2211 by Antonio Jacob RN  Outcome: Progressing     Problem: Depression/Self Harm  Goal: Effect of psychiatric condition will be minimized and patient will be protected from self harm  Description: INTERVENTIONS:  1. Assess impact of patient's symptoms on level of functioning, self care needs and offer support as indicated  2. Assess patient/family knowledge of depression, impact on illness and need for teaching  3. Provide emotional support, presence and reassurance  4. Assess for possible suicidal thoughts or ideation. If patient expresses suicidal thoughts or statements do not leave alone, initiate Suicide Precautions, move to a room close to the nursing station and obtain sitter  5. Initiate consults as appropriate with Mental Health Professional, Spiritual Care,

## 2024-07-15 NOTE — PROGRESS NOTES
[facility]  Day 3 Interdisciplinary Treatment Plan NOTE    Review Date & Time: 7/15/2024 1000    Patient was in treatment team    Estimated Length of Stay Update:  3-5 days  Estimated Discharge Date Update: 7/18/2024    EDUCATION:   Learner Progress Toward Treatment Goals: Reviewed group plan and strategies    Method: Small group    Outcome: Verbalized understanding    PATIENT GOALS: medication compliance and group therapy attendance    PLAN/TREATMENT RECOMMENDATIONS UPDATE:medication compliance and group therapy attendance    GOALS UPDATE:   Time frame for Short-Term Goals: 3-5 days      Shanika Welch RN

## 2024-07-16 VITALS
TEMPERATURE: 96.9 F | HEIGHT: 72 IN | DIASTOLIC BLOOD PRESSURE: 72 MMHG | RESPIRATION RATE: 16 BRPM | OXYGEN SATURATION: 96 % | SYSTOLIC BLOOD PRESSURE: 142 MMHG | HEART RATE: 80 BPM | WEIGHT: 250 LBS | BODY MASS INDEX: 33.86 KG/M2

## 2024-07-16 PROCEDURE — 6370000000 HC RX 637 (ALT 250 FOR IP): Performed by: PSYCHIATRY & NEUROLOGY

## 2024-07-16 PROCEDURE — GZHZZZZ GROUP PSYCHOTHERAPY: ICD-10-PCS | Performed by: PSYCHIATRY & NEUROLOGY

## 2024-07-16 PROCEDURE — 6370000000 HC RX 637 (ALT 250 FOR IP): Performed by: NURSE PRACTITIONER

## 2024-07-16 PROCEDURE — 99239 HOSP IP/OBS DSCHRG MGMT >30: CPT | Performed by: NURSE PRACTITIONER

## 2024-07-16 RX ADMIN — ATORVASTATIN CALCIUM 20 MG: 40 TABLET, FILM COATED ORAL at 08:45

## 2024-07-16 RX ADMIN — BENZTROPINE MESYLATE 1 MG: 1 TABLET ORAL at 08:45

## 2024-07-16 RX ADMIN — CHOLECALCIFEROL TAB 125 MCG (5000 UNIT) 5000 UNITS: 125 TAB at 08:45

## 2024-07-16 RX ADMIN — PROPRANOLOL HYDROCHLORIDE 60 MG: 60 CAPSULE, EXTENDED RELEASE ORAL at 08:45

## 2024-07-16 RX ADMIN — FLUTICASONE PROPIONATE 1 SPRAY: 50 SPRAY, METERED NASAL at 08:48

## 2024-07-16 RX ADMIN — GABAPENTIN 200 MG: 100 CAPSULE ORAL at 15:07

## 2024-07-16 RX ADMIN — PANTOPRAZOLE SODIUM 40 MG: 40 TABLET, DELAYED RELEASE ORAL at 06:43

## 2024-07-16 RX ADMIN — DIVALPROEX SODIUM 500 MG: 500 TABLET, DELAYED RELEASE ORAL at 08:45

## 2024-07-16 RX ADMIN — LISINOPRIL 20 MG: 20 TABLET ORAL at 08:45

## 2024-07-16 RX ADMIN — DIVALPROEX SODIUM 500 MG: 500 TABLET, DELAYED RELEASE ORAL at 15:07

## 2024-07-16 RX ADMIN — TAMSULOSIN HYDROCHLORIDE 0.4 MG: 0.4 CAPSULE ORAL at 08:45

## 2024-07-16 RX ADMIN — GABAPENTIN 200 MG: 100 CAPSULE ORAL at 08:45

## 2024-07-16 RX ADMIN — PIOGLITAZONE 30 MG: 15 TABLET ORAL at 08:45

## 2024-07-16 ASSESSMENT — PAIN SCALES - GENERAL: PAINLEVEL_OUTOF10: 0

## 2024-07-16 NOTE — BH NOTE
[facility]  Discharge Note    Pt discharged with followings belongings:   Dental Appliances: None  Vision - Corrective Lenses: Eyeglasses  Hearing Aid: None  Jewelry: None  Body Piercings Removed: No  Clothing: Shirt  Other Valuables: Wallet   Valuables returned to patient. Patient educated on aftercare instructions: yes  Patient verbalize understanding of AVS:  yes.    Status EXAM upon discharge:  Mental Status and Behavioral Exam  Normal: No  Level of Assistance: Independent/Self  Facial Expression: Flat  Affect: Blunt  Level of Consciousness: Alert  Frequency of Checks: 4 times per hour, close  Mood:Normal: No  Mood: Depressed  Motor Activity:Normal: No  Motor Activity: Decreased  Eye Contact: Good  Observed Behavior: Cooperative, Friendly  Sexual Misconduct History: Current - no  Preception: [place] to person, [place] to time, [place] to place, [place] to situation  Attention:Normal: No  Attention: Distractible  Thought Processes: Unremarkable  Thought Content:Normal: Yes  Thought Content: Preoccupations, Poverty of content  Depression Symptoms: Loss of interest, Isolative  Anxiety Symptoms: Generalized  Shani Symptoms: No problems reported or observed.  Hallucinations: None  Delusions: No  Memory:Normal: No  Memory: Poor recent  Insight and Judgment: No  Insight and Judgment: Poor judgment, Poor insight (improving)    Tobacco Screening:  Practical Counseling, on admission, ross X, if applicable and completed (first 3 are required if patient doesn't refuse)yes:            ( X) Recognizing danger situations (included triggers and roadblocks)                    (X ) Coping skills (new ways to manage stress,relaxation techniques, changing routine, distraction)                                                           ( X) Basic information about quitting (benefits of quitting, techniques in how to quit, available resources  ( ) [facility]

## 2024-07-16 NOTE — GROUP NOTE
Group Therapy Note    Date: 7/16/2024    Group Start Time: 0930  Group End Time: 0945  Group Topic: Community Meeting    [facility]    Rachel Ayoub CTRS    Group Therapy Note    Attendees: 12    Date: 7/16/2024  Start Time: 0930  End Time:  0945  Number of Participants: 12    Type of Group: Community Meeting    Was updated on expectations of the unit, staffing, and programming.  Patient shared goal for today as \"Be positive.\"    Status After Intervention:  Improved    Participation Level: Active Listener and Interactive    Participation Quality: Appropriate, Attentive, and Sharing      Speech:  normal      Thought Process/Content: Logical  Linear      Affective Functioning: Congruent      Mood:  Appropriate      Level of consciousness:  Alert and Attentive      Response to Learning: Able to verbalize current knowledge/experience, Able to verbalize/acknowledge new learning, Able to retain information, Capable of insight, Able to change behavior, and Progressing to goal      Endings: None Reported    Modes of Intervention: Education, Support, Socialization, Exploration, Clarifying, and Problem-solving      Discipline Responsible: Psychoeducational Specialist      Signature:  LALY Valles

## 2024-07-16 NOTE — GROUP NOTE
Group Therapy Note    Date: 7/16/2024    Group Start Time: 1400  Group End Time: 1435  Group Topic: Relaxation    [facility]    Rachel Ayoub CTRS    Group Therapy Note    Attendees: 7    Date: 7/16/2024  Start Time: 1400  End Time:  1435  Number of Participants: 7    Type of Group: Relaxation    Name:  Declutter your Mind Guided Meditation    Patient's Goal:  Engage in guided meditation practice. Identify types of meditation.    Notes:  Patient was actively engaged in guided meditation.    Status After Intervention:  Improved    Participation Level: Active Listener and Interactive    Participation Quality: Appropriate, Attentive, and Sharing      Speech:  normal      Thought Process/Content: Logical  Linear      Affective Functioning: Congruent      Mood:  Appropriate      Level of consciousness:  Alert and Attentive      Response to Learning: Able to verbalize current knowledge/experience, Able to verbalize/acknowledge new learning, Able to retain information, Capable of insight, Able to change behavior, and Progressing to goal      Endings: None Reported    Modes of Intervention: Education, Socialization, Exploration, and Activity      Discipline Responsible: Psychoeducational Specialist      Signature:  LALY Valles

## 2024-07-16 NOTE — GROUP NOTE
Group Therapy Note    Date: 7/16/2024    Group Start Time: 1105  Group End Time: 1200  Group Topic: Psychotherapy    [facility]    Petty Garza MSW, LSW        Group Therapy Note    Attendees: 11       Patient's Goal:  To increase social interaction and improve relationships with others.      Notes:  Pt was attentive in group and was able to identify an agenda. They were also able to verbalize relating to others within the group.     Status After Intervention:  Unchanged    Participation Level: Active Listener and Interactive    Participation Quality: Appropriate, Attentive, and Sharing      Speech:  normal      Thought Process/Content: Logical      Affective Functioning: Congruent      Mood: anxious      Level of consciousness:  Alert and Attentive      Response to Learning: Able to verbalize current knowledge/experience, Able to verbalize/acknowledge new learning, and Able to retain information      Endings: None Reported    Modes of Intervention: Education, Support, Socialization, Exploration, Clarifying, and Problem-solving      Discipline Responsible: /Counselor      Signature:  GERSON Calles LSW

## 2024-07-16 NOTE — DISCHARGE INSTRUCTIONS
Follow up for Tobacco Cessation at:    formerly [facility]                                 Date:  Friday 7/19 at 10am              [address]    [place], [place] [address]   (Inside [facility]    take B elevators to 7th floor)   Phone: [phone]   Fax: [phone]    [facility]  or Dual(substance use and mental health)  Monday, Tuesday, Thursday 8:30-12:30   [facility]   [address], [facility] [address]   Phone: [phone]   Fax: [phone]   Please enter at the main entrance and go down the humphries to elevator B, go up  to the 7th floor, check in at the first door in the left hallway.     [facility]   Monday Tuesday, Thursday 9-12 PM or 6-9 PM  [address] [address]   Phone: [phone]   Fax: [phone]      [facility] or Substance Use-   Wednesday and Friday 9-5 PM     [facility]   [address] [place] [address]   Phone: [phone]   Fax: [phone]     [facility]  Jamal  Tuesday Wednesday and Thursday 10-12:30  [facility]   [address] [place] [address]   Phone: [phone]   Fax: [phone]      [facility] [place] [place] [facility]  Monday Wednesday Thursday 9-12 or 5-8    [facility]   [address] [place] [address]   Phone: [phone]   Fax: [phone]     [facility]-  Monday Wednesday Friday 10-1 or Monday Tuesday Thursday 5-8    [facility]   [address], [place] [address]   Phone: [phone]   Fax: [phone]     [facility]   9-12 Monday- Friday (they pick what day they want to go 2-4 days a week) they offer transportation through [facility]   [facility] ([facility]) Monday to Friday 9-12 (Must go 2-4 days a week)   Address: [address] [place] [address]   Phone: [phone]

## 2024-07-16 NOTE — CARE COORDINATION
KIARA called [facility] Phone: [phone] and requested to speak with Becky BAJWA to discuss discharge for today. KIARA left a voicemail requesting a call back.

## 2024-07-16 NOTE — DISCHARGE SUMMARY
DISCHARGE SUMMARY      Patient ID:  Juvenal Andre  [id number]  53 y.o.  1970    Admit date: 7/11/2024    Discharge date and time: 7/16/2024    Admitting Physician: Cassie Garland MD     Discharge Physician: Dr Gill LOPEZ    Discharge Diagnoses:   Patient Active Problem List   Diagnosis    Depression    Essential hypertension    Gastroesophageal reflux disease    Benign prostatic hyperplasia    Schizoaffective disorder, bipolar type (HCC)       Admission Condition: poor    Discharged Condition: stable    Admission Circumstance:  Serafin Andre has a past psychiatric history of schizoaffective disorder bipolar type presented to the ED after patient called EMS due to having irrational thoughts of hurting other people patient was placed on an involuntary hold by the ED physician unknown precipitating factors or duration onset of symptoms       PAST MEDICAL/PSYCHIATRIC HISTORY:   Past Medical History:   Diagnosis Date    Anxiety     Arthritis     Hyperlipidemia     Hypertension     Impulse control disease     Schizo affective schizophrenia (HCC)        FAMILY/SOCIAL HISTORY:  Family History   Problem Relation Age of Onset    No Known Problems Mother     No Known Problems Sister      Social History     Socioeconomic History    Marital status: Single     Spouse name: Not on file    Number of children: Not on file    Years of education: Not on file    Highest education level: Not on file   Occupational History     Employer: NOT EMPLOYED   Tobacco Use    Smoking status: Every Day     Current packs/day: 1.50     Average packs/day: 1.5 packs/day for 24.4 years (36.6 ttl pk-yrs)     Types: Cigarettes     Start date: 2/18/2000    Smokeless tobacco: Never   Vaping Use    Vaping Use: Never used   Substance and Sexual Activity    Alcohol use: Not Currently    Drug use: Not Currently    Sexual activity: Not Currently   Other Topics Concern    Not on file   Social History Narrative    Not on file     Social Determinants of Health  MG tablet  Commonly known as: CLARITIN  Take 1 tablet by mouth daily               Where to Get Your Medications        Information about where to get these medications is not yet available    Ask your nurse or doctor about these medications  ARIPiprazole lauroxil 882 MG/3.2ML Prsy injection  gabapentin 100 MG capsule  nicotine 21 MG/24HR       Patient is counseled he must been compliant with all medications all outpatient follow-up appointments    Patient is discharged home in stable condition    TIME SPEND - 35 MINUTES TO COMPLETE THE EVALUATION, DISCHARGE SUMMARY, MEDICATION RECONCILIATION AND FOLLOW UP CARE     Signed:  GRISELDA Tapia - CNP  7/16/2024  9:05 AM

## 2024-07-16 NOTE — PLAN OF CARE
Problem: Pain  Goal: Verbalizes/displays adequate comfort level or baseline comfort level  7/15/2024 2226 by Aileen Ugarte RN  Outcome: Progressing  7/15/2024 0950 by Shanika Welch RN  Outcome: Progressing     Problem: Anxiety  Goal: Will report anxiety at manageable levels  Description: INTERVENTIONS:  1. Administer medication as ordered  2. Teach and rehearse alternative coping skills  3. Provide emotional support with 1:1 interaction with staff  7/15/2024 2226 by Aileen Ugarte RN  Outcome: Progressing  7/15/2024 0950 by Shanika Welch RN  Outcome: Progressing     Problem: Coping  Goal: Pt/Family able to verbalize concerns and demonstrate effective coping strategies  Description: INTERVENTIONS:  1. Assist patient/family to identify coping skills, available support systems and cultural and spiritual values  2. Provide emotional support, including active listening and acknowledgement of concerns of patient and caregivers  3. Reduce environmental stimuli, as able  4. Instruct patient/family in relaxation techniques, as appropriate  5. Assess for spiritual pain/suffering and initiate Spiritual Care, Psychosocial Clinical Specialist consults as needed  7/15/2024 2226 by Aileen Ugarte RN  Outcome: Progressing  7/15/2024 0950 by Shanika Welch RN  Outcome: Progressing     Problem: Decision Making  Goal: Pt/Family able to effectively weigh alternatives and participate in decision making related to treatment and care  Description: INTERVENTIONS:  1. Determine when there are differences between patient's view, family's view, and healthcare provider's view of condition  2. Facilitate patient and family articulation of goals for care  3. Help patient and family identify pros/cons of alternative solutions  4. Provide information as requested by patient/family  5. Respect patient/family right to receive or not to receive information  6. Serve as a liaison between patient and family and health care

## 2024-07-16 NOTE — CARE COORDINATION
KIARA called pt's legal guardian Viral Bolañospuja [phone] extension [phone] to discuss discharge planning. KIARA left a voicemail requesting a call back.

## 2024-07-16 NOTE — PLAN OF CARE
Patient denies suicidal ideation, homicidal ideation, and AV hallucinations. He is has a flat affect but does brighten on conversation. He appears sad and worried. He is friendly and cooperative on assessment. He is compliant with medications and is attending groups. He has had no behaviors at this time.    Problem: Pain  Goal: Verbalizes/displays adequate comfort level or baseline comfort level  7/16/2024 0940 by Beth Hammer, RN  Outcome: Progressing     Problem: Anxiety  Goal: Will report anxiety at manageable levels  Description: INTERVENTIONS:  1. Administer medication as ordered  2. Teach and rehearse alternative coping skills  3. Provide emotional support with 1:1 interaction with staff  7/16/2024 0940 by Beth Hammer, RN  Outcome: Progressing     Problem: Coping  Goal: Pt/Family able to verbalize concerns and demonstrate effective coping strategies  Description: INTERVENTIONS:  1. Assist patient/family to identify coping skills, available support systems and cultural and spiritual values  2. Provide emotional support, including active listening and acknowledgement of concerns of patient and caregivers  3. Reduce environmental stimuli, as able  4. Instruct patient/family in relaxation techniques, as appropriate  5. Assess for spiritual pain/suffering and initiate Spiritual Care, Psychosocial Clinical Specialist consults as needed  7/16/2024 0940 by Beth Hammer, RN  Outcome: Progressing     Problem: Decision Making  Goal: Pt/Family able to effectively weigh alternatives and participate in decision making related to treatment and care  Description: INTERVENTIONS:  1. Determine when there are differences between patient's view, family's view, and healthcare provider's view of condition  2. Facilitate patient and family articulation of goals for care  3. Help patient and family identify pros/cons of alternative solutions  4. Provide information as requested by patient/family  5. Respect

## 2024-07-16 NOTE — TRANSITION OF CARE
Behavioral Health Transition Record    Patient Name: Juvenal Andre  YOB: 1970   Medical Record Number: [id number]  Date of Admission: 7/11/2024  2:50 PM   Date of Discharge: 7/16/24    Attending Provider: Cassie Garland MD   Discharging Provider: Dr. Garland  To contact this individual call [phone] and ask the  to page.  If unavailable, ask to be transferred to Behavioral Health Provider on call.  A Behavioral Health Provider will be available on call 24/7 and during holidays.    Primary Care Provider: Elis Prieto DO    Allergies   Allergen Reactions    Lithium Swelling    Wellbutrin [Bupropion] Other (See Comments)     shaking    Zyprexa [Olanzapine]     Tegretol [Carbamazepine] Nausea And Vomiting       Reason for Admission: Serafin Andre has a past psychiatric history of schizoaffective disorder bipolar type presented to the ED after patient called EMS due to having irrational thoughts of hurting other people patient was placed on an involuntary hold by the ED physician unknown precipitating factors or duration onset of symptoms     Admission Diagnosis: Schizoaffective disorder, bipolar type (HCC) [F25.0]  Homicidal ideation [R45.850]    * No surgery found *    Results for orders placed or performed during the hospital encounter of 07/11/24   CBC with Auto Differential   Result Value Ref Range    WBC 11.8 (H) 4.5 - 11.5 k/uL    RBC 5.00 3.80 - 5.80 m/uL    Hemoglobin 15.1 12.5 - 16.5 g/dL    Hematocrit 46.0 37.0 - 54.0 %    MCV 92.0 80.0 - 99.9 fL    MCH 30.2 26.0 - 35.0 pg    MCHC 32.8 32.0 - 34.5 g/dL    RDW 14.6 11.5 - 15.0 %    Platelets 243 130 - 450 k/uL    MPV 11.2 7.0 - 12.0 fL    Neutrophils % 58 43.0 - 80.0 %    Lymphocytes % 32 20.0 - 42.0 %    Monocytes % 7 2.0 - 12.0 %    Eosinophils % 2 0 - 6 %    Basophils % 1 0.0 - 2.0 %    Immature Granulocytes % 1 0.0 - 5.0 %    Neutrophils Absolute 6.90 1.80 - 7.30 k/uL    Lymphocytes Absolute 3.73 1.50 - 4.00 k/uL    Monocytes

## 2024-07-16 NOTE — GROUP NOTE
Group Therapy Note    Date: 7/16/2024    Group Start Time: 0945  Group End Time: 1020  Group Topic: Psychoeducation    [facility] 2    Rachel Ayoub CTRS    Group Therapy Note    Attendees: 14    Date: 7/16/2024  Start Time: 0945  End Time:  1020  Number of Participants: 14    Type of Group: Psychoeducation    Name:  Stress and Wellbeing    Patient's Goal:  Identify types of stress, how stress affects mental and physical health, ways to cope with stress.    Notes:  CTRS led educational group discussion on stress and wellbeing. Encouraged patients to share their experiences. Patient was actively engaged in group discussion and made positive statements throughout group.    Status After Intervention:  Improved    Participation Level: Active Listener and Interactive    Participation Quality: Appropriate, Attentive, and Sharing      Speech:  normal      Thought Process/Content: Logical  Linear      Affective Functioning: Congruent      Mood:  Appropriate      Level of consciousness:  Alert and Attentive      Response to Learning: Able to verbalize current knowledge/experience, Able to verbalize/acknowledge new learning, Able to retain information, Capable of insight, Able to change behavior, and Progressing to goal      Endings: None Reported    Modes of Intervention: Education, Support, Socialization, Exploration, Clarifying, and Problem-solving      Discipline Responsible: Psychoeducational Specialist      Signature:  LALY Valles

## 2024-07-16 NOTE — CARE COORDINATION
KIARA received a call from pt's guardian Viral who stated that the pt knows the female that runs the groups at [facility]al [facility] and [facility] and with his delusions they do not think that the pt should come to the groups there because he may scare the other clients. Viral stated that he did call [facility] in [place] and scheduled an appointment for individual counseling on 7/29 at 2 PM.      KIARA informed Viral that the pt wanted to do an IOP program, KIARA added IOP information to the discharge paperwork.

## 2024-07-16 NOTE — CARE COORDINATION
SW met with pt to discuss discharge for today. Pt is alert and oriented x4. Pt's mood is neutral, affect is congruent. Pt's speech is clear, rate and volume is normal. Pt's insight and judgement is average. Pt denied depression and anxiety. Pt denied SI, HI, AVH. Pt reported that he will be discharging to his apartment and a taxi will be picking him up from the hospital. Pt stated that he is looking forward to getting a good meal and smoking a cigarette. Pt reported to feeling good today.     SW called pt's legal guardian Viral Lenz [phone] extension [phone] to discuss discharge planning. SW left a voicemail requesting a call back.      Pt has follow up appointments scheduled at Behavioral [facility] and [facility]. Pt has a counseling appointment on 7/22 and medication management appointment on 7/23, 7/31 and 8/12.    In order to ensure appropriate transition and discharge planning is in place, the following documents have been transmitted to Behavioral Medicine and [facility], as the new outpatient provider:    The d/c diagnosis was transmitted to the next care provider  The reason for hospitalization was transmitted to the next care provider  The d/c medications (dosage and indication) were transmitted to the next care provider   The continuing care plan was transmitted to the next care provider

## 2024-07-16 NOTE — PLAN OF CARE
Problem: Pain  Goal: Verbalizes/displays adequate comfort level or baseline comfort level  7/16/2024 0359 by Denise Fagan RN  Outcome: Progressing  7/15/2024 2226 by Aileen Ugarte RN  Outcome: Progressing     Problem: Anxiety  Goal: Will report anxiety at manageable levels  Description: INTERVENTIONS:  1. Administer medication as ordered  2. Teach and rehearse alternative coping skills  3. Provide emotional support with 1:1 interaction with staff  7/16/2024 0359 by Denise Fagan RN  Outcome: Progressing  7/15/2024 2226 by Aileen Ugarte RN  Outcome: Progressing     Problem: Coping  Goal: Pt/Family able to verbalize concerns and demonstrate effective coping strategies  Description: INTERVENTIONS:  1. Assist patient/family to identify coping skills, available support systems and cultural and spiritual values  2. Provide emotional support, including active listening and acknowledgement of concerns of patient and caregivers  3. Reduce environmental stimuli, as able  4. Instruct patient/family in relaxation techniques, as appropriate  5. Assess for spiritual pain/suffering and initiate Spiritual Care, Psychosocial Clinical Specialist consults as needed  7/16/2024 0359 by Denise Fagan RN  Outcome: Progressing  7/15/2024 2226 by Aileen Ugarte RN  Outcome: Progressing     Problem: Decision Making  Goal: Pt/Family able to effectively weigh alternatives and participate in decision making related to treatment and care  Description: INTERVENTIONS:  1. Determine when there are differences between patient's view, family's view, and healthcare provider's view of condition  2. Facilitate patient and family articulation of goals for care  3. Help patient and family identify pros/cons of alternative solutions  4. Provide information as requested by patient/family  5. Respect patient/family right to receive or not to receive information  6. Serve as a liaison between patient and family and health care team  7. Initiate  Consults from Ethics, Palliative Care or initiate Family Care Conference as is appropriate  7/16/2024 0359 by Denise Fagan RN  Outcome: Progressing  7/15/2024 2226 by Aileen Ugarte RN  Outcome: Progressing     Problem: Depression/Self Harm  Goal: Effect of psychiatric condition will be minimized and patient will be protected from self harm  Description: INTERVENTIONS:  1. Assess impact of patient's symptoms on level of functioning, self care needs and offer support as indicated  2. Assess patient/family knowledge of depression, impact on illness and need for teaching  3. Provide emotional support, presence and reassurance  4. Assess for possible suicidal thoughts or ideation. If patient expresses suicidal thoughts or statements do not leave alone, initiate Suicide Precautions, move to a room close to the nursing station and obtain sitter  5. Initiate consults as appropriate with Mental Health Professional, Spiritual Care, Psychosocial CNS, and consider a recommendation to the LIP for a Psychiatric Consultation  7/16/2024 0359 by Denise Fagan RN  Outcome: Progressing  Flowsheets (Taken 7/15/2024 2229 by Aileen Ugarte RN)  Effect of psychiatric condition will be minimized and patient will be protected from self harm: Provide emotional support, presence and reassurance  7/15/2024 2226 by Aileen Ugarte RN  Outcome: Progressing     Problem: Involuntary Admit  Goal: Will cooperate with staff recommendations and doctor's orders and will demonstrate appropriate behavior  Description: INTERVENTIONS:  1. Treat underlying conditions and offer medication as ordered  2. Educate regarding involuntary admission procedures and rules  3. Contain excessive/inappropriate behavior per unit and hospital policies  7/16/2024 0359 by Denise Fagan RN  Outcome: Progressing  7/15/2024 2226 by Aileen Ugarte RN  Outcome: Progressing     Problem: Safety - Adult  Goal: Free from fall injury  7/15/2024 2226 by Aileen Ugarte

## 2024-07-16 NOTE — CARE COORDINATION
KIARA called pt's legal guardian Viral Lenz [phone] extension [phone] who denied any questions or concerns for the pt as long as the medications are sent to the [facility] and the pt is aware of the medications he is suppose to take and they are in the bubble pack. KIARA informed guardian of follow up appointment and MENDOZA. Viral stated that he has talked to the pt on the phone and he has been sounding good.     KIARA called [facility] to schedule an insurance ride home [phone]. KIARA stated to go to the main entrance and call the RN station upon arrival. ETA- 2:20-4:20 PM. Confirmation Number [id number]

## 2024-07-17 NOTE — CARE COORDINATION
KIARA received a call from pt's legal guardian Viral Lenz [phone] stating he and the pt had some questions about pt medications. KIARA transferred the call to the nurses station.

## 2024-09-11 NOTE — H&P
Age of Onset    No Known Problems Mother     No Known Problems Sister              EXAMINATION:    REVIEW OF SYSTEMS:    ROS:  [x] All negative/unchanged except if checked. Explain positive(checked items) below:  [] Constitutional  [] Eyes  [] Ear/Nose/Mouth/Throat  [] Respiratory  [] CV  [] GI  []   [] Musculoskeletal  [] Skin/Breast  [] Neurological  [] Endocrine  [] Heme/Lymph  [] Allergic/Immunologic    Explanation:     Vitals:  /67   Pulse 73   Temp 97.9 °F (36.6 °C) (Infrared)   Resp 16   Ht 6' (1.829 m)   Wt 260 lb (117.9 kg)   SpO2 96%   BMI 35.26 kg/m²      Physical Examination:   Head: x  Atraumatic: x normocephalic  Skin and Mucosa        Moist x  Dry   Pale  x Normal   Neck:  Thyroid  Palpable   x  Not palpable   venus distention   adenopathy   Chest: x Clear   Rhonchi     Wheezing   CV:  xS1   xS2    xNo murmer   Abdomen:  x  Soft    Tender    Viceromegaly   Extremities:  x No Edema     Edema     Cranial Nerves Examination:   CN II:   xPupils are reactive to light  Pupils are non reactive to light  CN III, IV, VI:  xNo eye deviation    No diplopia or ptosis   CN V:    xFacial Sensation is intact     Facial Sensation is not intact   CN IIIV:   x Hearing is normal to rubbing fingers   CN IX, X:     xNormal gag reflex and phonation   CN XI:   xShoulder shrug and neck rotation is normal  CNXII:    xTongue is midline no deviation or atrophy    Mental Status Examination:    Level of consciousness:  within normal limits   Appearance:  fair grooming and fair hygiene  Behavior/Motor:  no abnormalities noted  Attitude toward examiner:  cooperative  Speech:  spontaneous, normal rate and normal volume  Mood: \" My mood is okay, I was paranoid. \"  Affect: blunted, relaxed, congruent with stated mood  Thought processes: concrete   Thought content: Devoid of any auditory visual hallucinations delusions, he does endorse paranoia, and states that people were coming in and out of his apartment, he states
TELEMETRY

## 2024-12-21 ENCOUNTER — OFFICE VISIT (OUTPATIENT)
Dept: FAMILY MEDICINE CLINIC | Age: 54
End: 2024-12-21
Payer: MEDICAID

## 2024-12-21 VITALS
SYSTOLIC BLOOD PRESSURE: 140 MMHG | BODY MASS INDEX: 34.67 KG/M2 | HEART RATE: 87 BPM | OXYGEN SATURATION: 96 % | TEMPERATURE: 98.1 F | RESPIRATION RATE: 22 BRPM | WEIGHT: 256 LBS | DIASTOLIC BLOOD PRESSURE: 80 MMHG | HEIGHT: 72 IN

## 2024-12-21 DIAGNOSIS — L03.311 CELLULITIS, ABDOMINAL WALL: Primary | ICD-10-CM

## 2024-12-21 DIAGNOSIS — L03.311 CELLULITIS, ABDOMINAL WALL: ICD-10-CM

## 2024-12-21 PROCEDURE — 99214 OFFICE O/P EST MOD 30 MIN: CPT | Performed by: FAMILY MEDICINE

## 2024-12-21 PROCEDURE — 3077F SYST BP >= 140 MM HG: CPT | Performed by: FAMILY MEDICINE

## 2024-12-21 PROCEDURE — 3017F COLORECTAL CA SCREEN DOC REV: CPT | Performed by: FAMILY MEDICINE

## 2024-12-21 PROCEDURE — 96372 THER/PROPH/DIAG INJ SC/IM: CPT | Performed by: FAMILY MEDICINE

## 2024-12-21 PROCEDURE — G8427 DOCREV CUR MEDS BY ELIG CLIN: HCPCS | Performed by: FAMILY MEDICINE

## 2024-12-21 PROCEDURE — G8484 FLU IMMUNIZE NO ADMIN: HCPCS | Performed by: FAMILY MEDICINE

## 2024-12-21 PROCEDURE — 4004F PT TOBACCO SCREEN RCVD TLK: CPT | Performed by: FAMILY MEDICINE

## 2024-12-21 PROCEDURE — 3079F DIAST BP 80-89 MM HG: CPT | Performed by: FAMILY MEDICINE

## 2024-12-21 PROCEDURE — G8417 CALC BMI ABV UP PARAM F/U: HCPCS | Performed by: FAMILY MEDICINE

## 2024-12-21 RX ORDER — TIOTROPIUM BROMIDE INHALATION SPRAY 3.12 UG/1
2 SPRAY, METERED RESPIRATORY (INHALATION)
COMMUNITY
Start: 2024-09-06

## 2024-12-21 RX ORDER — FLUTICASONE PROPIONATE AND SALMETEROL 50; 250 UG/1; UG/1
1 POWDER RESPIRATORY (INHALATION) 2 TIMES DAILY
COMMUNITY
Start: 2024-09-06

## 2024-12-21 RX ORDER — CEFTRIAXONE 1 G/1
1000 INJECTION, POWDER, FOR SOLUTION INTRAMUSCULAR; INTRAVENOUS ONCE
Status: COMPLETED | OUTPATIENT
Start: 2024-12-21 | End: 2024-12-21

## 2024-12-21 RX ORDER — DIVALPROEX SODIUM 500 MG/1
500 TABLET, DELAYED RELEASE ORAL
COMMUNITY

## 2024-12-21 RX ORDER — BENZTROPINE MESYLATE 0.5 MG/1
0.5 TABLET ORAL 2 TIMES DAILY
COMMUNITY

## 2024-12-21 RX ORDER — CHOLECALCIFEROL (VITAMIN D3) 1250 MCG
50000 CAPSULE ORAL WEEKLY
COMMUNITY

## 2024-12-21 RX ORDER — TRAZODONE HYDROCHLORIDE 50 MG/1
50 TABLET, FILM COATED ORAL
COMMUNITY

## 2024-12-21 RX ORDER — CEFDINIR 300 MG/1
300 CAPSULE ORAL 2 TIMES DAILY
Qty: 14 CAPSULE | Refills: 0 | Status: SHIPPED | OUTPATIENT
Start: 2024-12-21 | End: 2024-12-28

## 2024-12-21 RX ORDER — ATORVASTATIN CALCIUM 40 MG/1
40 TABLET, FILM COATED ORAL DAILY
COMMUNITY

## 2024-12-21 RX ADMIN — CEFTRIAXONE 1000 MG: 1 INJECTION, POWDER, FOR SOLUTION INTRAMUSCULAR; INTRAVENOUS at 11:17

## 2024-12-21 SDOH — ECONOMIC STABILITY: INCOME INSECURITY: HOW HARD IS IT FOR YOU TO PAY FOR THE VERY BASICS LIKE FOOD, HOUSING, MEDICAL CARE, AND HEATING?: PATIENT DECLINED

## 2024-12-21 SDOH — ECONOMIC STABILITY: FOOD INSECURITY: WITHIN THE PAST 12 MONTHS, THE FOOD YOU BOUGHT JUST DIDN'T LAST AND YOU DIDN'T HAVE MONEY TO GET MORE.: PATIENT DECLINED

## 2024-12-21 SDOH — ECONOMIC STABILITY: FOOD INSECURITY: WITHIN THE PAST 12 MONTHS, YOU WORRIED THAT YOUR FOOD WOULD RUN OUT BEFORE YOU GOT MONEY TO BUY MORE.: PATIENT DECLINED

## 2024-12-21 NOTE — PROGRESS NOTES
To Years    1.5 2/18/2000  24.8      Smokeless Tobacco Use  Never              Alcohol History       Alcohol Use Status  Not Currently              Drug Use       Drug Use Status  Not Currently              Sexual Activity       Sexually Active  Not Currently                    OBJECTIVE  Vitals:    12/21/24 1020   BP: (!) 140/80   Site: Right Upper Arm   Position: Sitting   Cuff Size: Large Adult   Pulse: 87   Resp: 22   Temp: 98.1 °F (36.7 °C)   TempSrc: Temporal   SpO2: 96%   Weight: 116.1 kg (256 lb)   Height: 1.829 m (6')        Body mass index is 34.72 kg/m².    Orders Placed This Encounter   Procedures    Culture, Tissue (with Gram Stain)     Standing Status:   Future     Standing Expiration Date:   12/21/2025        EXAM   Physical Exam  Vitals and nursing note reviewed.   Constitutional:       Appearance: He is obese.      Comments: Looks older than stated age   HENT:      Right Ear: Tympanic membrane and external ear normal.      Left Ear: Tympanic membrane and external ear normal.      Nose: Congestion present.      Mouth/Throat:      Pharynx: Oropharynx is clear. No posterior oropharyngeal erythema.   Eyes:      Conjunctiva/sclera: Conjunctivae normal.   Cardiovascular:      Rate and Rhythm: Normal rate and regular rhythm.      Heart sounds: No murmur heard.  Pulmonary:      Effort: Pulmonary effort is normal.      Breath sounds: Normal breath sounds.   Abdominal:      General: Bowel sounds are normal.      Comments: Hemorrhagic bullae LLQ culture obtained. Flare around it about 6\" in diameter. No inguinal adendopathy   Musculoskeletal:      Cervical back: No tenderness.   Lymphadenopathy:      Cervical: No cervical adenopathy.   Neurological:      Mental Status: He is alert.           Juvenal was seen today for other and diarrhea.    Diagnoses and all orders for this visit:    Cellulitis, abdominal wall  -     cefTRIAXone (ROCEPHIN) injection 1,000 mg  -     cefdinir (OMNICEF) 300 MG capsule; Take 1

## 2024-12-23 LAB
CULTURE: ABNORMAL
DIRECT EXAM: ABNORMAL
SPECIMEN DESCRIPTION: ABNORMAL

## 2025-06-23 ENCOUNTER — HOSPITAL ENCOUNTER (INPATIENT)
Age: 55
LOS: 7 days | Discharge: HOME | DRG: 761 | End: 2025-06-30
Attending: EMERGENCY MEDICINE | Admitting: PSYCHIATRY & NEUROLOGY
Payer: MEDICAID

## 2025-06-23 DIAGNOSIS — R44.0 AUDITORY HALLUCINATIONS: Primary | ICD-10-CM

## 2025-06-23 PROBLEM — F25.9 SCHIZO AFFECTIVE SCHIZOPHRENIA (HCC): Status: ACTIVE | Noted: 2025-06-23

## 2025-06-23 LAB
ALBUMIN SERPL-MCNC: 4 G/DL (ref 3.5–5.2)
ALP SERPL-CCNC: 66 U/L (ref 40–129)
ALT SERPL-CCNC: 17 U/L (ref 0–50)
AMPHET UR QL SCN: NEGATIVE
ANION GAP SERPL CALCULATED.3IONS-SCNC: 14 MMOL/L (ref 7–16)
APAP SERPL-MCNC: <5 UG/ML (ref 10–30)
AST SERPL-CCNC: 19 U/L (ref 0–50)
BARBITURATES UR QL SCN: NEGATIVE
BASOPHILS # BLD: 0.08 K/UL (ref 0–0.2)
BASOPHILS NFR BLD: 1 % (ref 0–2)
BENZODIAZ UR QL: NEGATIVE
BILIRUB SERPL-MCNC: 0.3 MG/DL (ref 0–1.2)
BILIRUB UR QL STRIP: NEGATIVE
BUN SERPL-MCNC: 19 MG/DL (ref 6–20)
BUPRENORPHINE UR QL: NEGATIVE
CALCIUM SERPL-MCNC: 8.6 MG/DL (ref 8.6–10)
CANNABINOIDS UR QL SCN: NEGATIVE
CHLORIDE SERPL-SCNC: 102 MMOL/L (ref 98–107)
CLARITY UR: CLEAR
CO2 SERPL-SCNC: 23 MMOL/L (ref 22–29)
COCAINE UR QL SCN: NEGATIVE
COLOR UR: YELLOW
COMMENT: ABNORMAL
CREAT SERPL-MCNC: 0.5 MG/DL (ref 0.7–1.2)
EOSINOPHIL # BLD: 0.12 K/UL (ref 0.05–0.5)
EOSINOPHILS RELATIVE PERCENT: 1 % (ref 0–6)
ERYTHROCYTE [DISTWIDTH] IN BLOOD BY AUTOMATED COUNT: 13.3 % (ref 11.5–15)
ETHANOLAMINE SERPL-MCNC: <10 MG/DL (ref 0–0.08)
FENTANYL UR QL: NEGATIVE
GFR, ESTIMATED: >90 ML/MIN/1.73M2
GLUCOSE SERPL-MCNC: 108 MG/DL (ref 74–99)
GLUCOSE UR STRIP-MCNC: >=1000 MG/DL
HCT VFR BLD AUTO: 44.9 % (ref 37–54)
HGB BLD-MCNC: 15.3 G/DL (ref 12.5–16.5)
HGB UR QL STRIP.AUTO: NEGATIVE
IMM GRANULOCYTES # BLD AUTO: 0.06 K/UL (ref 0–0.58)
IMM GRANULOCYTES NFR BLD: 1 % (ref 0–5)
KETONES UR STRIP-MCNC: ABNORMAL MG/DL
LEUKOCYTE ESTERASE UR QL STRIP: NEGATIVE
LYMPHOCYTES NFR BLD: 4.28 K/UL (ref 1.5–4)
LYMPHOCYTES RELATIVE PERCENT: 37 % (ref 20–42)
MCH RBC QN AUTO: 32.1 PG (ref 26–35)
MCHC RBC AUTO-ENTMCNC: 34.1 G/DL (ref 32–34.5)
MCV RBC AUTO: 94.1 FL (ref 80–99.9)
METHADONE UR QL: NEGATIVE
MONOCYTES NFR BLD: 0.98 K/UL (ref 0.1–0.95)
MONOCYTES NFR BLD: 9 % (ref 2–12)
NEUTROPHILS NFR BLD: 52 % (ref 43–80)
NEUTS SEG NFR BLD: 5.93 K/UL (ref 1.8–7.3)
NITRITE UR QL STRIP: NEGATIVE
OPIATES UR QL SCN: NEGATIVE
OXYCODONE UR QL SCN: NEGATIVE
PCP UR QL SCN: NEGATIVE
PH UR STRIP: 6 [PH] (ref 5–8)
PLATELET # BLD AUTO: 184 K/UL (ref 130–450)
PMV BLD AUTO: 11 FL (ref 7–12)
POTASSIUM SERPL-SCNC: 4.3 MMOL/L (ref 3.5–5.1)
PROT SERPL-MCNC: 6.8 G/DL (ref 6.4–8.3)
PROT UR STRIP-MCNC: NEGATIVE MG/DL
RBC # BLD AUTO: 4.77 M/UL (ref 3.8–5.8)
SALICYLATES SERPL-MCNC: <0.5 MG/DL (ref 0–30)
SODIUM SERPL-SCNC: 139 MMOL/L (ref 136–145)
SP GR UR STRIP: 1.02 (ref 1–1.03)
TEST INFORMATION: NORMAL
TOXIC TRICYCLIC SC,BLOOD: NEGATIVE
UROBILINOGEN UR STRIP-ACNC: 0.2 EU/DL (ref 0–1)
WBC OTHER # BLD: 11.5 K/UL (ref 4.5–11.5)

## 2025-06-23 PROCEDURE — 99285 EMERGENCY DEPT VISIT HI MDM: CPT

## 2025-06-23 PROCEDURE — 85025 COMPLETE CBC W/AUTO DIFF WBC: CPT

## 2025-06-23 PROCEDURE — 80053 COMPREHEN METABOLIC PANEL: CPT

## 2025-06-23 PROCEDURE — 80179 DRUG ASSAY SALICYLATE: CPT

## 2025-06-23 PROCEDURE — 6370000000 HC RX 637 (ALT 250 FOR IP): Performed by: PSYCHIATRY & NEUROLOGY

## 2025-06-23 PROCEDURE — 90791 PSYCH DIAGNOSTIC EVALUATION: CPT

## 2025-06-23 PROCEDURE — 80143 DRUG ASSAY ACETAMINOPHEN: CPT

## 2025-06-23 PROCEDURE — 81003 URINALYSIS AUTO W/O SCOPE: CPT

## 2025-06-23 PROCEDURE — G0480 DRUG TEST DEF 1-7 CLASSES: HCPCS

## 2025-06-23 PROCEDURE — 1240000000 HC EMOTIONAL WELLNESS R&B

## 2025-06-23 PROCEDURE — 93005 ELECTROCARDIOGRAM TRACING: CPT | Performed by: EMERGENCY MEDICINE

## 2025-06-23 PROCEDURE — 80307 DRUG TEST PRSMV CHEM ANLYZR: CPT

## 2025-06-23 RX ORDER — GABAPENTIN 400 MG/1
400 CAPSULE ORAL ONCE
Status: COMPLETED | OUTPATIENT
Start: 2025-06-24 | End: 2025-06-23

## 2025-06-23 RX ORDER — NICOTINE 21 MG/24HR
1 PATCH, TRANSDERMAL 24 HOURS TRANSDERMAL DAILY
Status: DISCONTINUED | OUTPATIENT
Start: 2025-06-24 | End: 2025-06-30 | Stop reason: HOSPADM

## 2025-06-23 RX ORDER — POLYETHYLENE GLYCOL 3350 17 G/17G
17 POWDER, FOR SOLUTION ORAL DAILY PRN
Status: DISCONTINUED | OUTPATIENT
Start: 2025-06-23 | End: 2025-06-30 | Stop reason: HOSPADM

## 2025-06-23 RX ORDER — MECOBALAMIN 5000 MCG
5 TABLET,DISINTEGRATING ORAL NIGHTLY PRN
Status: DISCONTINUED | OUTPATIENT
Start: 2025-06-23 | End: 2025-06-30 | Stop reason: HOSPADM

## 2025-06-23 RX ORDER — ACETAMINOPHEN 325 MG/1
650 TABLET ORAL EVERY 6 HOURS PRN
Status: DISCONTINUED | OUTPATIENT
Start: 2025-06-23 | End: 2025-06-30 | Stop reason: HOSPADM

## 2025-06-23 RX ORDER — ACETAMINOPHEN 325 MG/1
650 TABLET ORAL EVERY 4 HOURS PRN
Status: DISCONTINUED | OUTPATIENT
Start: 2025-06-23 | End: 2025-06-23

## 2025-06-23 RX ORDER — HALOPERIDOL 5 MG/ML
5 INJECTION INTRAMUSCULAR EVERY 6 HOURS PRN
Status: DISCONTINUED | OUTPATIENT
Start: 2025-06-23 | End: 2025-06-30 | Stop reason: HOSPADM

## 2025-06-23 RX ORDER — MAGNESIUM HYDROXIDE/ALUMINUM HYDROXICE/SIMETHICONE 120; 1200; 1200 MG/30ML; MG/30ML; MG/30ML
30 SUSPENSION ORAL PRN
Status: DISCONTINUED | OUTPATIENT
Start: 2025-06-23 | End: 2025-06-30 | Stop reason: HOSPADM

## 2025-06-23 RX ORDER — HYDROXYZINE HYDROCHLORIDE 50 MG/1
50 TABLET, FILM COATED ORAL 3 TIMES DAILY PRN
Status: DISCONTINUED | OUTPATIENT
Start: 2025-06-23 | End: 2025-06-30 | Stop reason: HOSPADM

## 2025-06-23 RX ORDER — DIVALPROEX SODIUM 500 MG/1
1000 TABLET, DELAYED RELEASE ORAL ONCE
Status: COMPLETED | OUTPATIENT
Start: 2025-06-24 | End: 2025-06-23

## 2025-06-23 RX ORDER — HALOPERIDOL 5 MG/1
5 TABLET ORAL EVERY 6 HOURS PRN
Status: DISCONTINUED | OUTPATIENT
Start: 2025-06-23 | End: 2025-06-30 | Stop reason: HOSPADM

## 2025-06-23 RX ADMIN — DIVALPROEX SODIUM 1000 MG: 500 TABLET, DELAYED RELEASE ORAL at 23:55

## 2025-06-23 RX ADMIN — GABAPENTIN 400 MG: 400 CAPSULE ORAL at 23:55

## 2025-06-23 RX ADMIN — Medication 5 MG: at 23:55

## 2025-06-23 ASSESSMENT — PAIN - FUNCTIONAL ASSESSMENT: PAIN_FUNCTIONAL_ASSESSMENT: NONE - DENIES PAIN

## 2025-06-23 NOTE — ED NOTES
Pt told isabel Omer rn, Pt stated he was HI toward Daphnie Saucedo and Renzo Harmon. He was homicidal towards anyone that follows him.

## 2025-06-23 NOTE — ED NOTES
SW reached out to pt's legal guardian Viral Bolañospuja [phone] for consent to treat.     No answer, left voicemail to call back.

## 2025-06-23 NOTE — ED NOTES
Pt's legal guardian Viral Delfin returned SW all and provided consent to treat for pt. Pt asked about Janeth Saucedo and Renzo Harmon. SW was told that pt hasn't seen Leonel or Renzo for years. Pt has a fixed delusion that they are coming into his apartment. They are unable to discuss this as a delusion with pt as he thinks this is real. SW was told by Viral that this weekend was difficult as he felt like they were coming into his apartment. Unsure where Leonel Saucedo or Renzo Harmon lives currently.
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
## 2025-06-23 NOTE — ED PROVIDER NOTES
HPI:  6/23/25,   Time: 4:52 PM EDT       Juvenal Andre is a 54 y.o. male presenting to the ED for auditory hallucinations/thoughts of hurting others, beginning days ago.  The complaint has been persistent, moderate in severity, and worsened by emotional upset.  Psych history.  States having auditory hallucinations past several days.  Tell him to hurt other people he has having conflict with.  No HI.  No nausea vomiting diarrhea.  No chest pain shortness of breath.    Review of Systems:   Pertinent positives and negatives are stated within HPI, all other systems reviewed and are negative.          --------------------------------------------- PAST HISTORY ---------------------------------------------  Past Medical History:  has a past medical history of Anxiety, Arthritis, Hyperlipidemia, Hypertension, Impulse control disease, and Schizo affective schizophrenia (HCC).    Past Surgical History:  has a past surgical history that includes Tonsillectomy.    Social History:  reports that he has been smoking cigarettes. He started smoking about 25 years ago. He has a 38 pack-year smoking history. He has never used smokeless tobacco. He reports that he does not currently use alcohol. He reports that he does not currently use drugs.    Family History: family history includes No Known Problems in his mother and sister.     The patient’s home medications have been reviewed.    Allergies: Lithium, Wellbutrin [bupropion], Olanzapine, Sheep-derived products, and Carbamazepine        ---------------------------------------------------PHYSICAL EXAM--------------------------------------    Constitutional/General: Alert and oriented x3, well appearing, non toxic in NAD  Head: Normocephalic and atraumatic  Eyes: PERRL, EOMI, conjunctive normal, sclera non icteric  Mouth: Oropharynx clear, handling secretions, no trismus, no asymmetry of the posterior oropharynx or uvular edema  Neck: Supple, full ROM,   Respiratory:  Not in

## 2025-06-24 LAB
CHOLEST SERPL-MCNC: 138 MG/DL
EKG ATRIAL RATE: 77 BPM
EKG P AXIS: -8 DEGREES
EKG P-R INTERVAL: 158 MS
EKG Q-T INTERVAL: 400 MS
EKG QRS DURATION: 92 MS
EKG QTC CALCULATION (BAZETT): 452 MS
EKG R AXIS: 9 DEGREES
EKG T AXIS: 10 DEGREES
EKG VENTRICULAR RATE: 77 BPM
HBA1C MFR BLD: 6.4 % (ref 4–5.6)
HDLC SERPL-MCNC: 36 MG/DL
LDLC SERPL CALC-MCNC: 71 MG/DL
TRIGL SERPL-MCNC: 158 MG/DL
VLDLC SERPL CALC-MCNC: 32 MG/DL

## 2025-06-24 PROCEDURE — 90792 PSYCH DIAG EVAL W/MED SRVCS: CPT

## 2025-06-24 PROCEDURE — 1240000000 HC EMOTIONAL WELLNESS R&B

## 2025-06-24 PROCEDURE — 83036 HEMOGLOBIN GLYCOSYLATED A1C: CPT

## 2025-06-24 PROCEDURE — 93010 ELECTROCARDIOGRAM REPORT: CPT | Performed by: INTERNAL MEDICINE

## 2025-06-24 PROCEDURE — 6370000000 HC RX 637 (ALT 250 FOR IP): Performed by: PSYCHIATRY & NEUROLOGY

## 2025-06-24 PROCEDURE — 80061 LIPID PANEL: CPT

## 2025-06-24 PROCEDURE — 36415 COLL VENOUS BLD VENIPUNCTURE: CPT

## 2025-06-24 PROCEDURE — 6370000000 HC RX 637 (ALT 250 FOR IP)

## 2025-06-24 RX ORDER — HALOPERIDOL 5 MG/1
5 TABLET ORAL NIGHTLY PRN
Status: ON HOLD | COMMUNITY
End: 2025-06-30 | Stop reason: HOSPADM

## 2025-06-24 RX ORDER — FLUTICASONE PROPIONATE 50 MCG
1 SPRAY, SUSPENSION (ML) NASAL DAILY PRN
Status: DISCONTINUED | OUTPATIENT
Start: 2025-06-24 | End: 2025-06-30 | Stop reason: HOSPADM

## 2025-06-24 RX ORDER — DIVALPROEX SODIUM 250 MG/1
250 TABLET, DELAYED RELEASE ORAL EVERY MORNING
Status: ON HOLD | COMMUNITY
End: 2025-06-30 | Stop reason: HOSPADM

## 2025-06-24 RX ORDER — DIVALPROEX SODIUM 500 MG/1
500 TABLET, DELAYED RELEASE ORAL
Status: DISCONTINUED | OUTPATIENT
Start: 2025-06-24 | End: 2025-06-30 | Stop reason: HOSPADM

## 2025-06-24 RX ORDER — CETIRIZINE HYDROCHLORIDE 10 MG/1
10 TABLET ORAL DAILY
Status: DISCONTINUED | OUTPATIENT
Start: 2025-06-24 | End: 2025-06-30 | Stop reason: HOSPADM

## 2025-06-24 RX ORDER — HALOPERIDOL 5 MG/1
5 TABLET ORAL NIGHTLY
Status: DISCONTINUED | OUTPATIENT
Start: 2025-06-24 | End: 2025-06-30 | Stop reason: HOSPADM

## 2025-06-24 RX ORDER — GABAPENTIN 400 MG/1
400 CAPSULE ORAL NIGHTLY
COMMUNITY

## 2025-06-24 RX ORDER — LORATADINE 10 MG/1
10 TABLET ORAL DAILY PRN
COMMUNITY

## 2025-06-24 RX ORDER — ATORVASTATIN CALCIUM 40 MG/1
40 TABLET, FILM COATED ORAL DAILY
Status: DISCONTINUED | OUTPATIENT
Start: 2025-06-24 | End: 2025-06-30 | Stop reason: HOSPADM

## 2025-06-24 RX ORDER — LORAZEPAM 0.5 MG/1
0.5 TABLET ORAL 2 TIMES DAILY PRN
Status: DISCONTINUED | OUTPATIENT
Start: 2025-06-24 | End: 2025-06-30 | Stop reason: HOSPADM

## 2025-06-24 RX ORDER — DULAGLUTIDE 1.5 MG/.5ML
1.5 INJECTION, SOLUTION SUBCUTANEOUS WEEKLY
Status: DISCONTINUED | OUTPATIENT
Start: 2025-06-24 | End: 2025-06-24

## 2025-06-24 RX ORDER — HALOPERIDOL 5 MG/1
5 TABLET ORAL NIGHTLY PRN
Status: DISCONTINUED | OUTPATIENT
Start: 2025-06-24 | End: 2025-06-24

## 2025-06-24 RX ORDER — TAMSULOSIN HYDROCHLORIDE 0.4 MG/1
0.8 CAPSULE ORAL NIGHTLY
Status: DISCONTINUED | OUTPATIENT
Start: 2025-06-24 | End: 2025-06-30 | Stop reason: HOSPADM

## 2025-06-24 RX ORDER — DIVALPROEX SODIUM 500 MG/1
1500 TABLET, DELAYED RELEASE ORAL
Status: DISCONTINUED | OUTPATIENT
Start: 2025-06-24 | End: 2025-06-30 | Stop reason: HOSPADM

## 2025-06-24 RX ORDER — DIVALPROEX SODIUM 500 MG/1
1500 TABLET, DELAYED RELEASE ORAL
Status: ON HOLD | COMMUNITY
End: 2025-06-30 | Stop reason: HOSPADM

## 2025-06-24 RX ORDER — MONTELUKAST SODIUM 10 MG/1
10 TABLET ORAL NIGHTLY
Status: DISCONTINUED | OUTPATIENT
Start: 2025-06-24 | End: 2025-06-30 | Stop reason: HOSPADM

## 2025-06-24 RX ORDER — ERGOCALCIFEROL 1.25 MG/1
50000 CAPSULE, LIQUID FILLED ORAL WEEKLY
Status: DISCONTINUED | OUTPATIENT
Start: 2025-06-27 | End: 2025-06-30 | Stop reason: HOSPADM

## 2025-06-24 RX ORDER — DIVALPROEX SODIUM 500 MG/1
500 TABLET, DELAYED RELEASE ORAL
Status: DISCONTINUED | OUTPATIENT
Start: 2025-06-24 | End: 2025-06-24 | Stop reason: DRUGHIGH

## 2025-06-24 RX ORDER — LISINOPRIL 10 MG/1
10 TABLET ORAL DAILY
Status: DISCONTINUED | OUTPATIENT
Start: 2025-06-24 | End: 2025-06-30 | Stop reason: HOSPADM

## 2025-06-24 RX ORDER — GABAPENTIN 400 MG/1
400 CAPSULE ORAL NIGHTLY
Status: DISCONTINUED | OUTPATIENT
Start: 2025-06-24 | End: 2025-06-30 | Stop reason: HOSPADM

## 2025-06-24 RX ORDER — DIVALPROEX SODIUM 500 MG/1
750 TABLET, DELAYED RELEASE ORAL
Status: ON HOLD | COMMUNITY
End: 2025-06-30 | Stop reason: HOSPADM

## 2025-06-24 RX ORDER — PANTOPRAZOLE SODIUM 40 MG/1
40 TABLET, DELAYED RELEASE ORAL
Status: DISCONTINUED | OUTPATIENT
Start: 2025-06-25 | End: 2025-06-30 | Stop reason: HOSPADM

## 2025-06-24 RX ORDER — DULAGLUTIDE 1.5 MG/.5ML
1.5 INJECTION, SOLUTION SUBCUTANEOUS WEEKLY
Status: DISCONTINUED | OUTPATIENT
Start: 2025-06-25 | End: 2025-06-30 | Stop reason: HOSPADM

## 2025-06-24 RX ORDER — PROPRANOLOL HYDROCHLORIDE 60 MG/1
60 CAPSULE, EXTENDED RELEASE ORAL DAILY
Status: DISCONTINUED | OUTPATIENT
Start: 2025-06-24 | End: 2025-06-30 | Stop reason: HOSPADM

## 2025-06-24 RX ORDER — AMOXICILLIN 500 MG
1200 CAPSULE ORAL DAILY
Status: DISCONTINUED | OUTPATIENT
Start: 2025-06-24 | End: 2025-06-24 | Stop reason: CLARIF

## 2025-06-24 RX ORDER — PIOGLITAZONE 15 MG/1
30 TABLET ORAL DAILY
Status: DISCONTINUED | OUTPATIENT
Start: 2025-06-24 | End: 2025-06-30 | Stop reason: HOSPADM

## 2025-06-24 RX ORDER — LORAZEPAM 0.5 MG/1
0.5 TABLET ORAL 2 TIMES DAILY PRN
COMMUNITY

## 2025-06-24 RX ADMIN — HALOPERIDOL 5 MG: 5 TABLET ORAL at 21:34

## 2025-06-24 RX ADMIN — MONTELUKAST SODIUM 10 MG: 10 TABLET, FILM COATED ORAL at 21:34

## 2025-06-24 RX ADMIN — HALOPERIDOL 5 MG: 5 TABLET ORAL at 12:01

## 2025-06-24 RX ADMIN — CETIRIZINE HYDROCHLORIDE 10 MG: 10 TABLET, FILM COATED ORAL at 11:09

## 2025-06-24 RX ADMIN — PIOGLITAZONE 30 MG: 15 TABLET ORAL at 11:09

## 2025-06-24 RX ADMIN — DIVALPROEX SODIUM 500 MG: 500 TABLET, DELAYED RELEASE ORAL at 15:13

## 2025-06-24 RX ADMIN — LORAZEPAM 0.5 MG: 0.5 TABLET ORAL at 12:01

## 2025-06-24 RX ADMIN — PROPRANOLOL HYDROCHLORIDE 60 MG: 60 CAPSULE, EXTENDED RELEASE ORAL at 11:09

## 2025-06-24 RX ADMIN — LISINOPRIL 10 MG: 10 TABLET ORAL at 11:09

## 2025-06-24 RX ADMIN — TAMSULOSIN HYDROCHLORIDE 0.8 MG: 0.4 CAPSULE ORAL at 21:34

## 2025-06-24 RX ADMIN — ATORVASTATIN CALCIUM 40 MG: 40 TABLET, FILM COATED ORAL at 11:09

## 2025-06-24 RX ADMIN — DIVALPROEX SODIUM 1500 MG: 500 TABLET, DELAYED RELEASE ORAL at 21:35

## 2025-06-24 RX ADMIN — GABAPENTIN 400 MG: 400 CAPSULE ORAL at 21:34

## 2025-06-24 ASSESSMENT — PATIENT HEALTH QUESTIONNAIRE - PHQ9
2. FEELING DOWN, DEPRESSED OR HOPELESS: SEVERAL DAYS
SUM OF ALL RESPONSES TO PHQ QUESTIONS 1-9: 2
3. TROUBLE FALLING OR STAYING ASLEEP: NEARLY EVERY DAY
SUM OF ALL RESPONSES TO PHQ QUESTIONS 1-9: 12
SUM OF ALL RESPONSES TO PHQ QUESTIONS 1-9: 2
7. TROUBLE CONCENTRATING ON THINGS, SUCH AS READING THE NEWSPAPER OR WATCHING TELEVISION: SEVERAL DAYS
5. POOR APPETITE OR OVEREATING: NOT AT ALL
SUM OF ALL RESPONSES TO PHQ QUESTIONS 1-9: 12
1. LITTLE INTEREST OR PLEASURE IN DOING THINGS: SEVERAL DAYS
SUM OF ALL RESPONSES TO PHQ QUESTIONS 1-9: 2
SUM OF ALL RESPONSES TO PHQ QUESTIONS 1-9: 2
4. FEELING TIRED OR HAVING LITTLE ENERGY: NEARLY EVERY DAY
8. MOVING OR SPEAKING SO SLOWLY THAT OTHER PEOPLE COULD HAVE NOTICED. OR THE OPPOSITE, BEING SO FIGETY OR RESTLESS THAT YOU HAVE BEEN MOVING AROUND A LOT MORE THAN USUAL: MORE THAN HALF THE DAYS
6. FEELING BAD ABOUT YOURSELF - OR THAT YOU ARE A FAILURE OR HAVE LET YOURSELF OR YOUR FAMILY DOWN: NOT AT ALL
SUM OF ALL RESPONSES TO PHQ QUESTIONS 1-9: 12
SUM OF ALL RESPONSES TO PHQ QUESTIONS 1-9: 12
9. THOUGHTS THAT YOU WOULD BE BETTER OFF DEAD, OR OF HURTING YOURSELF: NOT AT ALL
10. IF YOU CHECKED OFF ANY PROBLEMS, HOW DIFFICULT HAVE THESE PROBLEMS MADE IT FOR YOU TO DO YOUR WORK, TAKE CARE OF THINGS AT HOME, OR GET ALONG WITH OTHER PEOPLE: SOMEWHAT DIFFICULT
2. FEELING DOWN, DEPRESSED OR HOPELESS: MORE THAN HALF THE DAYS
1. LITTLE INTEREST OR PLEASURE IN DOING THINGS: SEVERAL DAYS

## 2025-06-24 ASSESSMENT — SLEEP AND FATIGUE QUESTIONNAIRES
DO YOU USE A SLEEP AID: YES
DO YOU HAVE DIFFICULTY SLEEPING: YES
AVERAGE NUMBER OF SLEEP HOURS: 4
DO YOU USE A SLEEP AID: COMMENT
SLEEP PATTERN: EARLY AWAKENING;RESTLESSNESS
SLEEP PATTERN: DIFFICULTY FALLING ASLEEP;DISTURBED/INTERRUPTED SLEEP
DO YOU HAVE DIFFICULTY SLEEPING: YES
AVERAGE NUMBER OF SLEEP HOURS: 4

## 2025-06-24 NOTE — GROUP NOTE
Group Therapy Note    Date: 6/24/2025    Group Start Time: 1030  Group End Time: 1045  Group Topic: Community Meeting    [facility] 2    Rachel Ayoub CTRS    Group Therapy Note    Attendees: 8    Date: 6/24/2025  Start Time: 1030  End Time:  1045  Number of Participants: 8    Type of Group: Community Meeting    Patient's Goal:  Increased awareness of expectations of the milieu, daily staffing and programming. Identified goal for the day.     Notes:  Patient was an active listener in group. Patient denied having a goal for the day.    Status After Intervention:  Improved    Participation Level: Active Listener and Interactive    Participation Quality: Appropriate, Attentive, and Sharing      Speech:  normal      Thought Process/Content: Logical  Linear      Affective Functioning: Blunted      Mood: Flat      Level of consciousness:  Alert and Attentive      Response to Learning: Able to verbalize current knowledge/experience, Able to verbalize/acknowledge new learning, Able to retain information, Capable of insight, Able to change behavior, and Progressing to goal      Endings: None Reported    Modes of Intervention: Education, Support, Socialization, Exploration, Clarifying, and Problem-solving      Discipline Responsible: Psychoeducational Specialist      Signature:  LALY Valles

## 2025-06-24 NOTE — BH NOTE
Pt stopped this nurse in the humphries and stated \"I want to hurt someone.\" Then nurse then asked pt who he wanted to hurt pt then stated \"I don't know.\" This nurse asked pt if he knew why he was feeling this way and pt stated \"I'm not sure.\" Nurse then asked pt if he would like to go to his room and where it is quieter and pt stated \"No.\" Pt then told this nurse he had called his guardian and told them he wasn't taking any more medications. This nurse then asked pt why he did not want to take any more medications and pt stated \"They make it worse. I'm not taking them.\" This nurse notified RN of the interaction.

## 2025-06-24 NOTE — GROUP NOTE
Group Therapy Note    Date: 6/24/2025    Group Start Time: 1045  Group End Time: 1115  Group Topic: Psychoeducation    [facility] ACUTE BH 2    Rachel Ayoub CTRS    Group Therapy Note    Attendees: 8    Date: 6/24/2025  Start Time: 1045  End Time:  1115  Number of Participants: 8    Type of Group: Psychoeducation    Name:  Values     Patient's Goal:  Identified how values affect mental health, influences of values and current personal values.     Notes:  CTRS led educational group discussion on values. Encouraged patients to share their experiences. Patient did not add to group discussion and left group before group concluded.    Status After Intervention:  Unchanged    Participation Level: None    Participation Quality: Resistant      Speech:  None      Thought Process/Content: None observed      Affective Functioning: Blunted      Mood: Flat      Level of consciousness:  Inattentive      Response to Learning: Resistant      Endings: None Reported    Modes of Intervention: Education, Support, Socialization, Exploration, Clarifying, and Problem-solving      Discipline Responsible: Psychoeducational Specialist      Signature:  LALY Valles

## 2025-06-24 NOTE — BH NOTE
4 Eyes Skin Assessment     NAME:  Juvenal Andre  YOB: 1970  MEDICAL RECORD NUMBER:  [id number]    The patient is being assessed for  Admission    I agree that at least one RN has performed a thorough Head to Toe Skin Assessment on the patient. ALL assessment sites listed below have been assessed.      Areas assessed by both nurses:    Head, Face, Ears, Shoulders, Back, Chest, Arms, Elbows, Hands, Sacrum. Buttock, Coccyx, Ischium, and Legs. Feet and Heels        Does the Patient have a Wound? No noted wound(s)       Cooper Prevention initiated by RN: Yes  Wound Care Orders initiated by RN: No    Pressure Injury (Stage 3,4, Unstageable, DTI, NWPT, and Complex wounds) if present, place Wound referral order by RN under : No    New Ostomies, if present place, Ostomy referral order under : No     Nurse 1 eSignature: Electronically signed by Karen Spencer RN on 6/24/25 at 12:34 AM EDT    **SHARE this note so that the co-signing nurse can place an eSignature**    Nurse 2 eSignature: Electronically signed by Tessa Benson RN on 6/24/25 at 12:35 AM EDT
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
## 2025-06-24 NOTE — H&P
Department of Psychiatry  History and Physical - Adult     CHIEF COMPLAINT:    Chief Complaint   Patient presents with    Homicidal     [facility] in [place],  schizo-affective \"afraid that he will hurt someone\" denies SI, Delusions, has homicidal history, Jerome from [facility] does medication management. # [phone]       Patient was seen after discussing with the treatment team and reviewing the chart    CIRCUMSTANCES OF ADMISSION:     Patient name: Juvenal Andre  Patient's past mental health and addiction history: History of schizoaffective disorder bipolar type with previous inpatient psychiatric hospitalizations   Patient's presentation to the ED and why the patient needs admission: Auditory hallucinations homicidal ideation toward male female who reside at the [facility] with patient  Legal status:  []  Voluntary  [x]  Involuntary  []  Probate  Triggering/precipitating events: Unknown  Duration of triggering/precipitating events: within the last several weeks    HISTORY OF PRESENT ILLNESS:      The patient is a 54 y.o. male with legal guardian Viral Lenz with significant past history of schizoaffective disorder bipolar type with previous inpatient psychiatric hospitalization patient was last here seen was with [facility] beds at the [facility] in July 2024 presenting to the ED endorsing auditory hallucinations, homicidal ideation toward male female who reside at the [facility] with patient.  Urine drug screen negative, alcohol level negative, QTc 444.  Patient is medically cleared and involuntary admitted to [facility] psychiatric unit for further psychiatric assessment stabilization and treatment.     Upon evaluation today patient was seen in his room he sitting up in bed he is flat, blunted some underlying irritability is noted.  He does appear slightly anxious he is guarded and evasive at times when answering questions.  Patient states that he is active outpatient with [facility] he sees

## 2025-06-24 NOTE — BH NOTE
[facility]  Admission Note     54 y.o. male arrived to the unit wheelchair from [place] [place] slipped on 6/23. Patient denies suicidal ideations. Endorses homicidal ideations towards a male and female that he reports have been coming into his apartment damaging property and stealing from him. Identifies these individuals as Renzo Harmon and Daphnie Saucedo. Reports that Daphnie went to high school with him and was a friend of his sister. Patient lives at [facility] and states that this has been occurring the last 5 years. Patient denies any homicidal plan or intent currently. Per SW note from the ED the collateral received from patient's legal guardian, Viral Lenz, reports this is a fixed delusion and that the patient has not seen these individuals in years. Patient endorses auditory hallucinations of two voices that he states are a \"specific agitation\". Reports these voices are constant and tell him \"they are going to eat him alive\". Patient has a history of schizoaffective disorder. Denies any visual hallucinations currently. Treats outpatient with [facility] for medications but does not have a current therapist. Presents flat, blunt, anxious, and paranoid. Rates his anxiety a 4/10 and depression a 3/10. Oriented to room and rest of the unit. Consents signed. No questions or concerns voiced by the patient at this time. Q15 minute safety rounds continued.     Admission Type:   Admission Type: Involuntary    Reason for admission:  Reason for Admission: \"I've been having issues on the outside\"      Addictive Behavior:   Addictive Behavior  In the Past 3 Months, Have You Felt or Has Someone Told You That You Have a Problem With  : None    Medical Problems:   Past Medical History:   Diagnosis Date    Anxiety     Arthritis     Hyperlipidemia     Hypertension     Impulse control disease     Schizo affective schizophrenia (HCC)     Seizures (HCC)        Status EXAM:  Mental Status and

## 2025-06-24 NOTE — PLAN OF CARE
Patient denies SI/HI/AVH. Endorses anxiety and agitation. Denies depression. Endorses racing thoughts that are worse throughout the day. Appears paranoid, preoccupied,irritable, and agitated during assessment. Patient calmed down throughout the day. Showered  after dinner. Reports appetite and sleep are okay. Patient is taking medications without issues and is attending groups. Remains in control of behaviors. Patient requested PRN Haldol and Ativan for agitation and anxiety which was provided.     Problem: Self Harm/Suicidality  Goal: Will have no self-injury during hospital stay  Outcome: Progressing     Problem: Depression  Goal: Will be euthymic at discharge  Outcome: Progressing     Problem: Shani  Goal: Will exhibit normal sleep and speech and no impulsivity  Outcome: Progressing     Problem: Psychosis  Goal: Will report no hallucinations or delusions  Outcome: Progressing     Problem: Behavior  Goal: Pt/Family maintain appropriate behavior and adhere to behavioral management agreement, if implemented  Outcome: Progressing     Problem: Anxiety  Goal: Will report anxiety at manageable levels  Outcome: Progressing     Problem: Involuntary Admit  Goal: Will cooperate with staff recommendations and doctor's orders and will demonstrate appropriate behavior  Outcome: Progressing     Problem: Self Care Deficit  Goal: Return ADL status to a safe level of function  Outcome: Progressing     Problem: Coping  Goal: Pt/Family able to verbalize concerns and demonstrate effective coping strategies  Outcome: Progressing     Problem: Decision Making  Goal: Pt/Family able to effectively weigh alternatives and participate in decision making related to treatment and care  Outcome: Progressing     Problem: Risk for Elopement  Goal: Patient will not exit the unit/facility without proper excort  Outcome: Progressing     Problem: Safety - Adult  Goal: Free from fall injury  Outcome: Progressing

## 2025-06-24 NOTE — ED NOTES
Peter Caruso (Legal Guardian who is on call) returned call and writer reported that patient is being admitted to [facility] on a pink slip and assigned to bed 7310A.

## 2025-06-24 NOTE — GROUP NOTE
Group Therapy Note    Date: 6/24/2025    Group Start Time: 1400  Group End Time: 1425  Group Topic: Psychotherapy    [facility] ACUTE BH 2    Jaylene Oliveira LSW        Group Therapy Note    Attendees: 5     Patient's Goal:  To increase social interaction and improve relationships with others.      Notes:  Pt was attentive in group and was able to identify an agenda. They were also able to verbalize relating to others within the group.      Status After Intervention:  Improved    Participation Level: Active Listener and Interactive    Participation Quality: Appropriate, Attentive, Sharing, and Supportive      Speech:  normal      Thought Process/Content: Logical      Affective Functioning: Congruent      Mood: euthymic      Level of consciousness:  Alert, Oriented x4, and Attentive      Response to Learning: Able to verbalize current knowledge/experience, Able to verbalize/acknowledge new learning, Able to retain information, Capable of insight, and Progressing to goal      Endings: None Reported    Modes of Intervention: Education, Support, Socialization, Exploration, Clarifying, and Problem-solving      Discipline Responsible: /Counselor      Signature:  ZOË Lombardi

## 2025-06-24 NOTE — PLAN OF CARE
Problem: Psychosis  Goal: Will report no hallucinations or delusions  Description: INTERVENTIONS:  1. Administer medication as  ordered  2. Assist with reality testing to support increasing orientation  3. Assess if patient's hallucinations or delusions are encouraging self harm or harm to others and intervene as appropriate  6/24/2025 0340 by Karen Spencer RN  Outcome: Not Progressing     Problem: Self Harm/Suicidality  Goal: Will have no self-injury during hospital stay  Description: INTERVENTIONS:  1.  Ensure constant observer at bedside with Q15M safety checks  2.  Maintain a safe environment  3.  Secure patient belongings  4.  Ensure family/visitors adhere to safety recommendations  5.  Ensure safety tray has been added to patient's diet order  6.  Every shift and PRN: Re-assess suicidal risk via Frequent Screener    6/24/2025 0340 by Karen Spencer RN  Outcome: Progressing     Problem: Depression  Goal: Will be euthymic at discharge  Description: INTERVENTIONS:  1. Administer medication as ordered  2. Provide emotional support via 1:1 interaction with staff  3. Encourage involvement in milieu/groups/activities  4. Monitor for social isolation  6/24/2025 0340 by Karen Spencer RN  Outcome: Progressing     Problem: Shani  Goal: Will exhibit normal sleep and speech and no impulsivity  Description: INTERVENTIONS:  1. Administer medication as ordered  2. Set limits on impulsive behavior  3. Make attempts to decrease external stimuli as possible  6/24/2025 0340 by Karen Spencer RN  Outcome: Progressing     Problem: Behavior  Goal: Pt/Family maintain appropriate behavior and adhere to behavioral management agreement, if implemented  Description: INTERVENTIONS:  1. Assess patient/family's coping skills and  non-compliant behavior (including use of illegal substances)  2. Notify security of behavior or suspected illegal substances which indicate the need for search of the family and/or

## 2025-06-24 NOTE — BH NOTE
Patient notified this RN of his history of seizures when completing his admission. Reports not having any seizures for a couple of years but takes Neurontin and Depakote at home. Patient's medications unable to be verified at this time with [facility] due to being closed. Neurontin 400 mg PO HS and Depakote DR 1000 mg PO HS verified with the patient per chart review of outside medications. Dr. Garland called and notified of patient's seizure history. One time orders given for Depakote and Neurontin for patient to receive tonight until medications can be verified with the pharmacy in the morning. See eMAR.

## 2025-06-24 NOTE — CARE COORDINATION
Leisure assessment completed.     06/24/25 1320   Activities of Daily Living   Patient Requires assistance with daily self-care activities? Yes   Patient identified needing assistance with: Transportation   Details Pt reported he does not drive   Person Assisting Other   Person Assisting details Pt reported he utilizes the bus for transportation   Leisure Activity 1   3 Favorite Leisure Activities \"Go to Ramen, watch TV, outside activities.\"   Frequency > 2 hours/day   Last time this week   Barriers to participating    (None reported)   Leisure Activity 2   Favorite Leisure Activities  same as above   Leisure Activity 3   Favorite Leisure Activities  same as above   Social   Patient reports spending the majority of their free time alone   Patient verbalizes a preference for spending free time   (\"Sometimes alone. It depends.\")   Patient’s perception of support system healthy/strong   Patient’s perception of barriers to socializing with others include(s) lack of motivation/interest   Social Details Pt identified his family as being supportive. Pt reported he resides at [facility], is unemployed, has no children.   Beliefs & Coping   Has difficulty dealing with feelings   No   Internalizes feelings/Keeps feelings in Yes   Externalizes feelings through aggressiveness or poor temper control  Yes   Feels uncomfortable around others  No   Has difficulty talking to others  No   Depends on others for direction or decisions Yes  (\"I use my legal guardian or a friend.\")   Difficulty dealing with anger of others  Yes   Difficulty dealing with own anger  Yes   Difficulty managing stress Yes   Frequently has difficulty with relationships  Yes   which,who,where with friends/peers   Has recently perceived/experienced loss, disappointment, humiliation or failure  NO   General perception about self ambivalent   Attitude about abilities more successful than not   Locus of Control  sometimes   Belief about recovery

## 2025-06-24 NOTE — ED NOTES
Patient accepted to [facility] and assigned to bed 8610A. Called admitting and reported bed assignment to Laquita.

## 2025-06-24 NOTE — ED NOTES
Patient presented to Dr. Garland. Dr. Garland accepted patient to 7W with a diagnosis of Schizo affective schizophrenia

## 2025-06-24 NOTE — ED NOTES
Writer reached out to patients legal guardian Viral Delfin at [phone]. Left message with Janay, who is on call at [employer] [facility].

## 2025-06-25 LAB — GLUCOSE BLD-MCNC: 185 MG/DL (ref 74–99)

## 2025-06-25 PROCEDURE — 1240000000 HC EMOTIONAL WELLNESS R&B

## 2025-06-25 PROCEDURE — 99232 SBSQ HOSP IP/OBS MODERATE 35: CPT

## 2025-06-25 PROCEDURE — 6370000000 HC RX 637 (ALT 250 FOR IP)

## 2025-06-25 PROCEDURE — 6370000000 HC RX 637 (ALT 250 FOR IP): Performed by: PSYCHIATRY & NEUROLOGY

## 2025-06-25 PROCEDURE — 82962 GLUCOSE BLOOD TEST: CPT

## 2025-06-25 RX ORDER — HALOPERIDOL 2 MG/1
2 TABLET ORAL DAILY
Status: DISCONTINUED | OUTPATIENT
Start: 2025-06-25 | End: 2025-06-30 | Stop reason: HOSPADM

## 2025-06-25 RX ORDER — BENZTROPINE MESYLATE 0.5 MG/1
0.5 TABLET ORAL 2 TIMES DAILY
Status: DISCONTINUED | OUTPATIENT
Start: 2025-06-25 | End: 2025-06-30 | Stop reason: HOSPADM

## 2025-06-25 RX ORDER — ARIPIPRAZOLE LAUROXIL 882 MG/3.2ML
882 INJECTION, SUSPENSION, EXTENDED RELEASE INTRAMUSCULAR
COMMUNITY

## 2025-06-25 RX ADMIN — GABAPENTIN 400 MG: 400 CAPSULE ORAL at 21:20

## 2025-06-25 RX ADMIN — BENZTROPINE MESYLATE 0.5 MG: 0.5 TABLET ORAL at 21:20

## 2025-06-25 RX ADMIN — LISINOPRIL 10 MG: 10 TABLET ORAL at 09:31

## 2025-06-25 RX ADMIN — DIVALPROEX SODIUM 1500 MG: 500 TABLET, DELAYED RELEASE ORAL at 21:19

## 2025-06-25 RX ADMIN — DIVALPROEX SODIUM 500 MG: 500 TABLET, DELAYED RELEASE ORAL at 16:25

## 2025-06-25 RX ADMIN — HALOPERIDOL 2 MG: 2 TABLET ORAL at 14:08

## 2025-06-25 RX ADMIN — HALOPERIDOL 5 MG: 5 TABLET ORAL at 21:20

## 2025-06-25 RX ADMIN — DULAGLUTIDE 1.5 MG: 1.5 INJECTION, SOLUTION SUBCUTANEOUS at 13:22

## 2025-06-25 RX ADMIN — LORAZEPAM 0.5 MG: 0.5 TABLET ORAL at 11:26

## 2025-06-25 RX ADMIN — PIOGLITAZONE 30 MG: 15 TABLET ORAL at 09:31

## 2025-06-25 RX ADMIN — MONTELUKAST SODIUM 10 MG: 10 TABLET, FILM COATED ORAL at 21:20

## 2025-06-25 RX ADMIN — HALOPERIDOL 5 MG: 5 TABLET ORAL at 11:26

## 2025-06-25 RX ADMIN — ATORVASTATIN CALCIUM 40 MG: 40 TABLET, FILM COATED ORAL at 09:31

## 2025-06-25 RX ADMIN — ACETAMINOPHEN 650 MG: 325 TABLET ORAL at 09:34

## 2025-06-25 RX ADMIN — ACETAMINOPHEN 650 MG: 325 TABLET ORAL at 21:46

## 2025-06-25 RX ADMIN — PROPRANOLOL HYDROCHLORIDE 60 MG: 60 CAPSULE, EXTENDED RELEASE ORAL at 09:31

## 2025-06-25 RX ADMIN — Medication 5 MG: at 21:46

## 2025-06-25 RX ADMIN — BENZTROPINE MESYLATE 0.5 MG: 0.5 TABLET ORAL at 14:08

## 2025-06-25 RX ADMIN — CETIRIZINE HYDROCHLORIDE 10 MG: 10 TABLET, FILM COATED ORAL at 09:31

## 2025-06-25 RX ADMIN — DIVALPROEX SODIUM 750 MG: 500 TABLET, DELAYED RELEASE ORAL at 09:31

## 2025-06-25 RX ADMIN — PANTOPRAZOLE SODIUM 40 MG: 40 TABLET, DELAYED RELEASE ORAL at 06:38

## 2025-06-25 RX ADMIN — TAMSULOSIN HYDROCHLORIDE 0.8 MG: 0.4 CAPSULE ORAL at 21:20

## 2025-06-25 ASSESSMENT — PAIN SCALES - GENERAL
PAINLEVEL_OUTOF10: 0
PAINLEVEL_OUTOF10: 0
PAINLEVEL_OUTOF10: 5

## 2025-06-25 ASSESSMENT — PAIN DESCRIPTION - DESCRIPTORS: DESCRIPTORS: ACHING;DISCOMFORT

## 2025-06-25 ASSESSMENT — PAIN DESCRIPTION - ORIENTATION: ORIENTATION: UPPER

## 2025-06-25 ASSESSMENT — PAIN DESCRIPTION - LOCATION: LOCATION: BACK

## 2025-06-25 NOTE — CARE COORDINATION
Kamille called pts legal guardian- Viral [phone] to obtain collateral information. No answer, kamille left a voicemail.

## 2025-06-25 NOTE — CARE COORDINATION
Treatment team met with pt. He stated that he is hanging in there. He denied SI/HI/AVH. He asked about medication changes, NP stated that she will look at his meds.

## 2025-06-25 NOTE — GROUP NOTE
Group Therapy Note    Date: 6/25/2025    Group Start Time: 0930  Group End Time: 1010  Group Topic: Cognitive Skills    [facility] 7[facility] ACUTE BH 1    Petty Garza LISW        Group Therapy Note    Attendees: 8       Patient's Goal:  Pt attended group therapy where we read the poem, \"Desiderata,\" and discussed \"Fair Fighting Rules - ways to communicate effectively when a confrontational conversation is due.    Notes:  Pt was an active participant in group.    Status After Intervention:  Improved    Participation Level: Active Listener and Interactive    Participation Quality: Appropriate, Attentive, and Sharing      Speech:  normal      Thought Process/Content: Logical      Affective Functioning: Congruent      Mood: euthymic      Level of consciousness:  Alert, Oriented x4, and Attentive      Response to Learning: Able to verbalize current knowledge/experience, Able to verbalize/acknowledge new learning, and Able to retain information      Endings: None Reported    Modes of Intervention: Education, Support, Socialization, Exploration, Clarifying, and Problem-solving      Discipline Responsible: /Counselor      Signature:  MARCE Calles

## 2025-06-25 NOTE — PLAN OF CARE
Problem: Self Harm/Suicidality  Goal: Will have no self-injury during hospital stay  Description: INTERVENTIONS:  1.  Ensure constant observer at bedside with Q15M safety checks  2.  Maintain a safe environment  3.  Secure patient belongings  4.  Ensure family/visitors adhere to safety recommendations  5.  Ensure safety tray has been added to patient's diet order  6.  Every shift and PRN: Re-assess suicidal risk via Frequent Screener    6/24/2025 2256 by Larry Goldstein, RN  Outcome: Progressing     Problem: Depression  Goal: Will be euthymic at discharge  Description: INTERVENTIONS:  1. Administer medication as ordered  2. Provide emotional support via 1:1 interaction with staff  3. Encourage involvement in milieu/groups/activities  4. Monitor for social isolation  6/24/2025 2256 by Larry Goldstein RN  Outcome: Progressing     Problem: Behavior  Goal: Pt/Family maintain appropriate behavior and adhere to behavioral management agreement, if implemented  Description: INTERVENTIONS:  1. Assess patient/family's coping skills and  non-compliant behavior (including use of illegal substances)  2. Notify security of behavior or suspected illegal substances which indicate the need for search of the family and/or belongings  3. Encourage verbalization of thoughts and concerns in a socially appropriate manner  4. Utilize positive, consistent limit setting strategies supporting safety of patient, staff and others  5. Encourage participation in the decision making process about the behavioral management agreement  6. If a visitor's behavior poses a threat to safety call refer to organization policy.  7. Initiate consult with , Psychosocial CNS, Spiritual Care as appropriate  6/24/2025 2256 by Larry Goldstein, RN  Outcome: Progressing     Problem: Anxiety  Goal: Will report anxiety at manageable levels  Description: INTERVENTIONS:  1. Administer medication as ordered  2. Teach and rehearse alternative coping

## 2025-06-25 NOTE — PLAN OF CARE
Denies SI/HI  denies hallucinations  isolative to room this a.m.   cooperative with meds   pleasant on approach   affect is blunt   admits that he feels his meds are working and mood is improved    will continue to monitor

## 2025-06-26 LAB — GLUCOSE BLD-MCNC: 210 MG/DL (ref 74–99)

## 2025-06-26 PROCEDURE — 6370000000 HC RX 637 (ALT 250 FOR IP): Performed by: PSYCHIATRY & NEUROLOGY

## 2025-06-26 PROCEDURE — 6370000000 HC RX 637 (ALT 250 FOR IP)

## 2025-06-26 PROCEDURE — 1240000000 HC EMOTIONAL WELLNESS R&B

## 2025-06-26 PROCEDURE — 99232 SBSQ HOSP IP/OBS MODERATE 35: CPT

## 2025-06-26 PROCEDURE — 82962 GLUCOSE BLOOD TEST: CPT

## 2025-06-26 RX ADMIN — ATORVASTATIN CALCIUM 40 MG: 40 TABLET, FILM COATED ORAL at 09:17

## 2025-06-26 RX ADMIN — TAMSULOSIN HYDROCHLORIDE 0.8 MG: 0.4 CAPSULE ORAL at 22:09

## 2025-06-26 RX ADMIN — PIOGLITAZONE 30 MG: 15 TABLET ORAL at 09:16

## 2025-06-26 RX ADMIN — BENZTROPINE MESYLATE 0.5 MG: 0.5 TABLET ORAL at 22:09

## 2025-06-26 RX ADMIN — CETIRIZINE HYDROCHLORIDE 10 MG: 10 TABLET, FILM COATED ORAL at 09:17

## 2025-06-26 RX ADMIN — GABAPENTIN 400 MG: 400 CAPSULE ORAL at 22:09

## 2025-06-26 RX ADMIN — LISINOPRIL 10 MG: 10 TABLET ORAL at 09:17

## 2025-06-26 RX ADMIN — PANTOPRAZOLE SODIUM 40 MG: 40 TABLET, DELAYED RELEASE ORAL at 06:47

## 2025-06-26 RX ADMIN — HALOPERIDOL 5 MG: 5 TABLET ORAL at 22:09

## 2025-06-26 RX ADMIN — MONTELUKAST SODIUM 10 MG: 10 TABLET, FILM COATED ORAL at 22:09

## 2025-06-26 RX ADMIN — DIVALPROEX SODIUM 500 MG: 500 TABLET, DELAYED RELEASE ORAL at 17:06

## 2025-06-26 RX ADMIN — HALOPERIDOL 2 MG: 2 TABLET ORAL at 09:17

## 2025-06-26 RX ADMIN — DIVALPROEX SODIUM 1500 MG: 500 TABLET, DELAYED RELEASE ORAL at 22:09

## 2025-06-26 RX ADMIN — BENZTROPINE MESYLATE 0.5 MG: 0.5 TABLET ORAL at 09:17

## 2025-06-26 RX ADMIN — Medication 5 MG: at 22:10

## 2025-06-26 RX ADMIN — DIVALPROEX SODIUM 750 MG: 500 TABLET, DELAYED RELEASE ORAL at 09:18

## 2025-06-26 RX ADMIN — PROPRANOLOL HYDROCHLORIDE 60 MG: 60 CAPSULE, EXTENDED RELEASE ORAL at 09:17

## 2025-06-26 ASSESSMENT — PAIN SCALES - GENERAL: PAINLEVEL_OUTOF10: 0

## 2025-06-26 NOTE — GROUP NOTE
Group Therapy Note    Date: 6/26/2025    Group Start Time: 1030  Group End Time: 1045  Group Topic: Community Meeting    [facility] ACUTE BH 2    Rachel Ayoub CTRS    Group Therapy Note    Attendees: 10    Date: 6/26/2025  Start Time: 1030  End Time:  1045  Number of Participants: 10    Type of Group: Community Meeting    Patient's Goal:  Increased awareness of expectations of the milieu, daily staffing and programming. Identified goal for the day.    Notes:  Patient was an active listener in group. Patient shared goal for the day as, \"Go to more groups today.\"    Status After Intervention:  Improved    Participation Level: Active Listener and Interactive    Participation Quality: Attentive and Sharing      Speech:  normal      Thought Process/Content: Logical  Linear      Affective Functioning: Blunted      Mood: Anxious      Level of consciousness:  Alert and Attentive      Response to Learning: Able to verbalize current knowledge/experience, Able to verbalize/acknowledge new learning, Able to retain information, Capable of insight, Able to change behavior, and Progressing to goal      Endings: None Reported    Modes of Intervention: Education, Support, Socialization, Exploration, Clarifying, and Problem-solving      Discipline Responsible: Psychoeducational Specialist      Signature:  LALY Valles

## 2025-06-26 NOTE — GROUP NOTE
Group Therapy Note    Date: 6/26/2025    Group Start Time: 1400  Group End Time: 1435  Group Topic: Psychotherapy    [facility] ACUTE BH 2    Jaylene Oliveira LSW        Group Therapy Note    Attendees: 5     Patient's Goal:  To increase social interaction and improve relationships with others.      Notes:  Pt was attentive in group and was able to identify an agenda. They were also able to verbalize relating to others within the group.      Status After Intervention:  Improved    Participation Level: Active Listener and Interactive    Participation Quality: Appropriate, Attentive, Sharing, and Supportive      Speech:  normal      Thought Process/Content: Logical      Affective Functioning: Congruent      Mood: euthymic      Level of consciousness:  Alert, Oriented x4, and Attentive      Response to Learning: Able to verbalize current knowledge/experience, Able to verbalize/acknowledge new learning, Able to retain information, Capable of insight, and Progressing to goal      Endings: None Reported    Modes of Intervention: Education, Support, Socialization, Exploration, Clarifying, and Problem-solving      Discipline Responsible: /Counselor      Signature:  ZOË Lombardi

## 2025-06-26 NOTE — GROUP NOTE
Group Therapy Note    Date: 6/26/2025    Group Start Time: 1045  Group End Time: 1115  Group Topic: Psychoeducation    [facility] 2    Rachel Ayoub CTRS    Group Therapy Note    Attendees: 12    Date: 6/26/2025  Start Time: 1045  End Time:  1115  Number of Participants: 12    Type of Group: Psychoeducation    Name:  Social Support     Patient's Goal:  Identified types of social support, how social support affects mental health and ways to improve social support.     Notes:  CTRS led educational group discussion on social support. Encouraged patients to share their experiences. Patient did not add to group discussion but listened to group quietly.    Status After Intervention:  Improved    Participation Level: Active Listener    Participation Quality: Appropriate, Attentive      Speech:  None      Thought Process/Content: None observed      Affective Functioning: Blunted      Mood: Anxious      Level of consciousness:  Alert and Attentive      Response to Learning: Progressing to goal      Endings: None Reported    Modes of Intervention: Education, Support, Socialization, Exploration, Clarifying, and Problem-solving      Discipline Responsible: Psychoeducational Specialist      Signature:  LALY Valles

## 2025-06-26 NOTE — CARE COORDINATION
Kimberley received a call from pts legal guardian - Viral [phone]. He stated that he would like for pt to be set up with [facility]. Sw stated that she will work on this. Kimberley asked about the HI when pt was admitted. He stated that it is 2 individuals that pt grew up with. He stated that he is pretty sure that they live in [place] now. He stated that pt hasn't seen them in years. He stated that their names are Renzo Harmon and Leonel Saucedo. He stated that pt believes that they are breaking into his apartment. He stated that pt mentioned that he thinks Leonel works at the [employer] by his apartment. He stated that pt has gone there and made threats towards some of the female workers thinking they were Leonel. He stated that Leonel does not work there. He stated that pt is ordered by the court to not return there or he can be arrested. He stated that prior to admission, pt stated that he was going to go to the [employer] and find Leonel. He stated that he thinks that these are delusions.

## 2025-06-26 NOTE — CARE COORDINATION
Treatment team met with pt. He stated that he is doing pretty good today. He stated that he feels rested. He denied SI/HI/AVH.

## 2025-06-26 NOTE — PLAN OF CARE
Problem: Self Harm/Suicidality  Goal: Will have no self-injury during hospital stay  Outcome: Progressing     Problem: Depression  Goal: Will be euthymic at discharge  Outcome: Progressing     Problem: Shani  Goal: Will exhibit normal sleep and speech and no impulsivity  Outcome: Progressing     Problem: Psychosis  Goal: Will report no hallucinations or delusions  Outcome: Progressing     Problem: Behavior  Goal: Pt/Family maintain appropriate behavior and adhere to behavioral management agreement, if implemented  Outcome: Progressing     Problem: Anxiety  Goal: Will report anxiety at manageable levels  Outcome: Progressing     Problem: Involuntary Admit  Goal: Will cooperate with staff recommendations and doctor's orders and will demonstrate appropriate behavior  Outcome: Progressing     Problem: Self Care Deficit  Goal: Return ADL status to a safe level of function  Outcome: Progressing     Problem: Coping  Goal: Pt/Family able to verbalize concerns and demonstrate effective coping strategies  Outcome: Progressing     Problem: Decision Making  Goal: Pt/Family able to effectively weigh alternatives and participate in decision making related to treatment and care  Outcome: Progressing     Problem: Risk for Elopement  Goal: Patient will not exit the unit/facility without proper excort  Outcome: Progressing     Problem: Safety - Adult  Goal: Free from fall injury  Outcome: Progressing     Problem: Pain  Goal: Verbalizes/displays adequate comfort level or baseline comfort level  Outcome: Progressing

## 2025-06-26 NOTE — CARE COORDINATION
Sw called [facility] [phone] to schedule an appointment for IOP. They stated that they are going to look into this to see if pt is able to come to them. They stated that they will call this worker back.

## 2025-06-26 NOTE — CARE COORDINATION
Sw called [facility] to schedule an appointment. They stated that they will have Mario call this worker to schedule an appointment.     [facility]  [address] [place] [id number]  Phone: [phone]  Fax [phone]

## 2025-06-26 NOTE — PLAN OF CARE
Problem: Self Harm/Suicidality  Goal: Will have no self-injury during hospital stay  Description: INTERVENTIONS:  1.  Ensure constant observer at bedside with Q15M safety checks  2.  Maintain a safe environment  3.  Secure patient belongings  4.  Ensure family/visitors adhere to safety recommendations  5.  Ensure safety tray has been added to patient's diet order  6.  Every shift and PRN: Re-assess suicidal risk via Frequent Screener    Outcome: Progressing     Problem: Depression  Goal: Will be euthymic at discharge  Description: INTERVENTIONS:  1. Administer medication as ordered  2. Provide emotional support via 1:1 interaction with staff  3. Encourage involvement in milieu/groups/activities  4. Monitor for social isolation  Outcome: Progressing     Problem: Shain  Goal: Will exhibit normal sleep and speech and no impulsivity  Description: INTERVENTIONS:  1. Administer medication as ordered  2. Set limits on impulsive behavior  3. Make attempts to decrease external stimuli as possible  Outcome: Progressing     Problem: Psychosis  Goal: Will report no hallucinations or delusions  Description: INTERVENTIONS:  1. Administer medication as  ordered  2. Assist with reality testing to support increasing orientation  3. Assess if patient's hallucinations or delusions are encouraging self harm or harm to others and intervene as appropriate  Outcome: Progressing       Pt denies suicidal/homicidal ideations at this time.  Pt denies auditory/visual hallucinations at this time.  Pt denies anxiety, depression levels at this time. Pt denies paranoia, and racing thoughts at this time.  Pt observed seated in day room, socializing with group of peers. Pt is flat, blunted, does not offer much conversation. Pt does report feeling dizziness and weakness, states \"I just got up from a nap I was dizzy\". VSS, pt encouraged to rise slowly and maintain adequate hydration. Pt appears somewhat preoccupied.   Pt HS med compliant, requested

## 2025-06-26 NOTE — BH NOTE
Patient resting in bed eyes closed respiratory rate and rhythm even unlabored >12, no s/sx respiratory distress noted at this time. Continue Q15min rounding.

## 2025-06-27 LAB — GLUCOSE BLD-MCNC: 202 MG/DL (ref 74–99)

## 2025-06-27 PROCEDURE — 1240000000 HC EMOTIONAL WELLNESS R&B

## 2025-06-27 PROCEDURE — 6370000000 HC RX 637 (ALT 250 FOR IP)

## 2025-06-27 PROCEDURE — 6370000000 HC RX 637 (ALT 250 FOR IP): Performed by: PSYCHIATRY & NEUROLOGY

## 2025-06-27 PROCEDURE — 82962 GLUCOSE BLOOD TEST: CPT

## 2025-06-27 PROCEDURE — 99232 SBSQ HOSP IP/OBS MODERATE 35: CPT

## 2025-06-27 RX ADMIN — CETIRIZINE HYDROCHLORIDE 10 MG: 10 TABLET, FILM COATED ORAL at 08:25

## 2025-06-27 RX ADMIN — ATORVASTATIN CALCIUM 40 MG: 40 TABLET, FILM COATED ORAL at 08:24

## 2025-06-27 RX ADMIN — PROPRANOLOL HYDROCHLORIDE 60 MG: 60 CAPSULE, EXTENDED RELEASE ORAL at 08:25

## 2025-06-27 RX ADMIN — BENZTROPINE MESYLATE 0.5 MG: 0.5 TABLET ORAL at 22:26

## 2025-06-27 RX ADMIN — ERGOCALCIFEROL 50000 UNITS: 1.25 CAPSULE ORAL at 08:24

## 2025-06-27 RX ADMIN — GABAPENTIN 400 MG: 400 CAPSULE ORAL at 22:26

## 2025-06-27 RX ADMIN — PANTOPRAZOLE SODIUM 40 MG: 40 TABLET, DELAYED RELEASE ORAL at 06:39

## 2025-06-27 RX ADMIN — HALOPERIDOL 2 MG: 2 TABLET ORAL at 08:24

## 2025-06-27 RX ADMIN — BENZTROPINE MESYLATE 0.5 MG: 0.5 TABLET ORAL at 08:24

## 2025-06-27 RX ADMIN — HALOPERIDOL 5 MG: 5 TABLET ORAL at 22:26

## 2025-06-27 RX ADMIN — MONTELUKAST SODIUM 10 MG: 10 TABLET, FILM COATED ORAL at 22:26

## 2025-06-27 RX ADMIN — TAMSULOSIN HYDROCHLORIDE 0.8 MG: 0.4 CAPSULE ORAL at 22:25

## 2025-06-27 RX ADMIN — PIOGLITAZONE 30 MG: 15 TABLET ORAL at 08:24

## 2025-06-27 RX ADMIN — DIVALPROEX SODIUM 1500 MG: 500 TABLET, DELAYED RELEASE ORAL at 22:26

## 2025-06-27 RX ADMIN — DIVALPROEX SODIUM 500 MG: 500 TABLET, DELAYED RELEASE ORAL at 16:07

## 2025-06-27 RX ADMIN — Medication 5 MG: at 22:26

## 2025-06-27 RX ADMIN — DIVALPROEX SODIUM 750 MG: 500 TABLET, DELAYED RELEASE ORAL at 08:24

## 2025-06-27 RX ADMIN — LISINOPRIL 10 MG: 10 TABLET ORAL at 08:24

## 2025-06-27 ASSESSMENT — PAIN SCALES - GENERAL: PAINLEVEL_OUTOF10: 0

## 2025-06-27 NOTE — BH NOTE
Patient denies suicidal ideation, homicidal ideations and AVH.  Presents flat, blunt, guarded, calm and cooperative during assessment.  Patient is out on the unit and is social with peers.  Medications taken without issue.  No complaints or concerns verbalized at this time.  No unit problems reported.  Will continue to observe and support.

## 2025-06-27 NOTE — CARE COORDINATION
KIARA met with pt in treatment team with NP and RN. Pt was seen in the common area sitting by the phone. Pt was advised of his possible discharge for Monday and pt denied having any questions or concerns regarding this plan.

## 2025-06-27 NOTE — PLAN OF CARE
Problem: Self Harm/Suicidality  Goal: Will have no self-injury during hospital stay  Description: INTERVENTIONS:  1.  Ensure constant observer at bedside with Q15M safety checks  2.  Maintain a safe environment  3.  Secure patient belongings  4.  Ensure family/visitors adhere to safety recommendations  5.  Ensure safety tray has been added to patient's diet order  6.  Every shift and PRN: Re-assess suicidal risk via Frequent Screener    6/26/2025 2234 by Tessa Benson, RN  Outcome: Progressing     Problem: Depression  Goal: Will be euthymic at discharge  Description: INTERVENTIONS:  1. Administer medication as ordered  2. Provide emotional support via 1:1 interaction with staff  3. Encourage involvement in milieu/groups/activities  4. Monitor for social isolation  6/26/2025 2234 by Tessa Benson, RN  Outcome: Progressing     Problem: Anxiety  Goal: Will report anxiety at manageable levels  Description: INTERVENTIONS:  1. Administer medication as ordered  2. Teach and rehearse alternative coping skills  3. Provide emotional support with 1:1 interaction with staff  6/26/2025 2234 by Tessa Benson, RN  Outcome: Progressing

## 2025-06-27 NOTE — PLAN OF CARE
Problem: Self Harm/Suicidality  Goal: Will have no self-injury during hospital stay  Description: INTERVENTIONS:  1.  Ensure constant observer at bedside with Q15M safety checks  2.  Maintain a safe environment  3.  Secure patient belongings  4.  Ensure family/visitors adhere to safety recommendations  5.  Ensure safety tray has been added to patient's diet order  6.  Every shift and PRN: Re-assess suicidal risk via Frequent Screener    Outcome: Progressing     Problem: Depression  Goal: Will be euthymic at discharge  Description: INTERVENTIONS:  1. Administer medication as ordered  2. Provide emotional support via 1:1 interaction with staff  3. Encourage involvement in milieu/groups/activities  4. Monitor for social isolation  Outcome: Progressing     Problem: Shani  Goal: Will exhibit normal sleep and speech and no impulsivity  Description: INTERVENTIONS:  1. Administer medication as ordered  2. Set limits on impulsive behavior  3. Make attempts to decrease external stimuli as possible  Outcome: Progressing      Patient denies SI/HI/Hallucinations. Patient is medication compliant and is discharged focused. Patient is in control of his behaviors. No active distress is noted respirations are even and unlabored. Will continue to monitor and will intervene as needed with close 15 minute rounds.

## 2025-06-27 NOTE — CARE COORDINATION
KIARA spoke with Mario with [facility] to schedule follow up mental health services.KIARA scheduled a medication management appointment with Jerome on 7/14 at 2:30 PM in office at [facility].     [facility] Office    [address] [place], [place] [address]. [place] [address]     Phone: [phone]    Fax [phone]

## 2025-06-27 NOTE — GROUP NOTE
Shared goal for the day as to be patient.                                                                       Group Therapy Note    Date: 6/27/2025    Group Start Time: 1100  Group End Time: 1130  Group Topic: Community Meeting    [facility] 1    Heather Flores, LALY    Type of Group:Community Meeting    Patient's Goal:  Patient will be able to id staffing assignments, expectations of patients, and general information re: floor rules.   Will be prompted to share goal for the day.     Notes:  Patient appeared to be an active listener, taking in information presented and was prompted to share goal for the day.    Status After Intervention:  improved    Participation Level: active listener    Participation Quality: appropriate    Speech:  normal    Thought Process/Content: logical     Affective Functioning:congruent    Mood: euthymic    Level of consciousness:  alert     Response to Learning: verbalize, retain     Endings:none reported    Modes of Intervention: education, support, clarifying     Discipline Responsible: Psychoeducation       Signature:  LALY Pineda

## 2025-06-27 NOTE — GROUP NOTE
Group Therapy Note    Date: 6/27/2025    Group Start Time: 1500  Group End Time: 1545  Group Topic: Recreational    [facility] 1    Heather Flores, LALY    Type of Group: Psychoeducation    Module Name:  think trivia     Patient's Goal:  pt will be able to participate in trivia with other pts, take turns and answer appropriately..     Notes:  pt pleasant and engaged in group, participating appropriately.     Status After Intervention:  Improved    Participation Level: Active Listener and Interactive    Participation Quality: Appropriate, Attentive, and Sharing      Speech:  normal      Thought Process/Content: Logical      Affective Functioning: Congruent      Mood: euthymic      Level of consciousness:  Alert, Oriented x4, and Attentive      Response to Learning: Able to verbalize/acknowledge new learning, Able to retain information, and Progressing to goal      Endings: None Reported    Modes of Intervention: Support, Socialization, and Activity      Discipline Responsible: Psychoeducational Specialist      Signature:  Heather Flores, BECKS

## 2025-06-28 LAB — GLUCOSE BLD-MCNC: 178 MG/DL (ref 74–99)

## 2025-06-28 PROCEDURE — 6370000000 HC RX 637 (ALT 250 FOR IP): Performed by: PSYCHIATRY & NEUROLOGY

## 2025-06-28 PROCEDURE — 82962 GLUCOSE BLOOD TEST: CPT

## 2025-06-28 PROCEDURE — 99232 SBSQ HOSP IP/OBS MODERATE 35: CPT

## 2025-06-28 PROCEDURE — 6370000000 HC RX 637 (ALT 250 FOR IP)

## 2025-06-28 PROCEDURE — 1240000000 HC EMOTIONAL WELLNESS R&B

## 2025-06-28 RX ADMIN — ATORVASTATIN CALCIUM 40 MG: 40 TABLET, FILM COATED ORAL at 10:01

## 2025-06-28 RX ADMIN — DIVALPROEX SODIUM 500 MG: 500 TABLET, DELAYED RELEASE ORAL at 16:39

## 2025-06-28 RX ADMIN — Medication 5 MG: at 22:16

## 2025-06-28 RX ADMIN — BENZTROPINE MESYLATE 0.5 MG: 0.5 TABLET ORAL at 10:01

## 2025-06-28 RX ADMIN — CETIRIZINE HYDROCHLORIDE 10 MG: 10 TABLET, FILM COATED ORAL at 10:01

## 2025-06-28 RX ADMIN — PANTOPRAZOLE SODIUM 40 MG: 40 TABLET, DELAYED RELEASE ORAL at 06:25

## 2025-06-28 RX ADMIN — GABAPENTIN 400 MG: 400 CAPSULE ORAL at 20:52

## 2025-06-28 RX ADMIN — MONTELUKAST SODIUM 10 MG: 10 TABLET, FILM COATED ORAL at 20:52

## 2025-06-28 RX ADMIN — DIVALPROEX SODIUM 1500 MG: 500 TABLET, DELAYED RELEASE ORAL at 20:52

## 2025-06-28 RX ADMIN — HALOPERIDOL 5 MG: 5 TABLET ORAL at 20:52

## 2025-06-28 RX ADMIN — PROPRANOLOL HYDROCHLORIDE 60 MG: 60 CAPSULE, EXTENDED RELEASE ORAL at 10:00

## 2025-06-28 RX ADMIN — LISINOPRIL 10 MG: 10 TABLET ORAL at 10:01

## 2025-06-28 RX ADMIN — PIOGLITAZONE 30 MG: 15 TABLET ORAL at 10:00

## 2025-06-28 RX ADMIN — HALOPERIDOL 2 MG: 2 TABLET ORAL at 09:59

## 2025-06-28 RX ADMIN — DIVALPROEX SODIUM 750 MG: 500 TABLET, DELAYED RELEASE ORAL at 09:59

## 2025-06-28 RX ADMIN — BENZTROPINE MESYLATE 0.5 MG: 0.5 TABLET ORAL at 20:52

## 2025-06-28 RX ADMIN — TAMSULOSIN HYDROCHLORIDE 0.8 MG: 0.4 CAPSULE ORAL at 20:52

## 2025-06-28 NOTE — BH NOTE
Patient resting quietly with eyes closed. No S/S of distress noted or observed. Prn medications Tylenol, Melatonin given at this time. Will continue to monitor, provide needs and safe environment with continue rounding every 15 minutes.

## 2025-06-28 NOTE — BH NOTE
Patient awake in day room, no inter action observed or noted. Alert and oriented x 4.Does not offer much conversation.  Patient has poor eye contact. Patient presents as flat,blunt. Observed preoccupied.  Appears well groomed/slightly disheveled  Patient denies suicidal/homicidal ideations and auditory/visual hallucinations.  Patient denies anxiety and depression. But is observed sitting bouncing legs up and down when doing evening assessment.  Patient denies pain  No unit behaviors observed or reported.  Patient is taking ordered medications without issue. Patient is attending select groups per note review.   Purposeful Q15 minute rounds continue.

## 2025-06-28 NOTE — GROUP NOTE
Group Therapy Note    Date: 6/28/2025    Group Start Time: 1015  Group End Time: 1045  Group Topic: Psychoeducation    [facility] 2    Rachel Ayoub CTRS    Group Therapy Note    Attendees: 9    Date: 6/28/2025  Start Time: 1015  End Time:  1045  Number of Participants: 9    Type of Group: Psychoeducation    Name:  Mindfulness    Patient's Goal:  Identified what is mindfulness, how mindfulness can help mental health and ways to incorporate mindfulness in daily activities.    Notes:  CTRS led educational group discussion on mindfulness. Encouraged patients to share their experiences. Patient was actively engaged in group discussion and made positive responses.    Status After Intervention:  Improved    Participation Level: Active Listener and Interactive    Participation Quality: Appropriate, Attentive, and Sharing      Speech:  normal      Thought Process/Content: Logical  Linear      Affective Functioning: Blunted      Mood: Flat      Level of consciousness:  Alert and Attentive      Response to Learning: Able to verbalize current knowledge/experience, Able to verbalize/acknowledge new learning, Able to retain information, Capable of insight, Able to change behavior, and Progressing to goal      Endings: None Reported    Modes of Intervention: Education, Support, Socialization, Exploration, Clarifying, and Problem-solving      Discipline Responsible: Psychoeducational Specialist      Signature:  LALY Valles

## 2025-06-28 NOTE — PLAN OF CARE
Pt denies homicidal/suicidal thoughts. Denies hallucinations. Pt is complaint with medications. Pt affect is flat and blunt. Pt is cooperative with medications. Pt is interactive with select peers on the unit. Pt denies anxiety and depression. Will continue to monitor and provide support.   Problem: Self Harm/Suicidality  Goal: Will have no self-injury during hospital stay  Description: INTERVENTIONS:  1.  Ensure constant observer at bedside with Q15M safety checks  2.  Maintain a safe environment  3.  Secure patient belongings  4.  Ensure family/visitors adhere to safety recommendations  5.  Ensure safety tray has been added to patient's diet order  6.  Every shift and PRN: Re-assess suicidal risk via Frequent Screener    6/27/2025 2313 by Naomi Garcia RN  Outcome: Progressing  6/27/2025 2313 by Naomi Garcia RN  Outcome: Progressing     Problem: Depression  Goal: Will be euthymic at discharge  Description: INTERVENTIONS:  1. Administer medication as ordered  2. Provide emotional support via 1:1 interaction with staff  3. Encourage involvement in milieu/groups/activities  4. Monitor for social isolation  6/27/2025 2313 by Naomi Garcia RN  Outcome: Progressing  6/27/2025 2313 by Naomi Garcia RN  Outcome: Progressing     Problem: Shani  Goal: Will exhibit normal sleep and speech and no impulsivity  Description: INTERVENTIONS:  1. Administer medication as ordered  2. Set limits on impulsive behavior  3. Make attempts to decrease external stimuli as possible  6/28/2025 1237 by Adriana Camarena RN  Outcome: Progressing  6/27/2025 2313 by Naomi Garcia RN  Outcome: Progressing  6/27/2025 2313 by Naomi Gacria RN  Outcome: Progressing     Problem: Behavior  Goal: Pt/Family maintain appropriate behavior and adhere to behavioral management agreement, if implemented  Description: INTERVENTIONS:  1. Assess patient/family's coping skills and  non-compliant behavior (including use of illegal

## 2025-06-28 NOTE — GROUP NOTE
Group Therapy Note    Date: 6/28/2025    Group Start Time: 1000  Group End Time: 1015  Group Topic: Community Meeting    [facility] 2    Rachel Ayoub CTRS    Group Therapy Note    Attendees: 8    Date: 6/28/2025  Start Time: 1000  End Time:  1015  Number of Participants: 8    Type of Group: Community Meeting    Patient's Goal:  Increased awareness of expectations of the milieu, daily staffing and programming. Identified goal for the day.    Notes:  Patient was an active listener in group. Patient shared goal for the day as, \"Going to groups.\"    Status After Intervention:  Improved    Participation Level: Active Listener and Interactive    Participation Quality: Appropriate, Attentive, and Sharing      Speech:  normal      Thought Process/Content: Logical  Linear      Affective Functioning: Blunted      Mood: Flat      Level of consciousness:  Alert and Attentive      Response to Learning: Able to verbalize current knowledge/experience, Able to verbalize/acknowledge new learning, Able to retain information, Capable of insight, Able to change behavior, and Progressing to goal      Endings: None Reported    Modes of Intervention: Education, Support, Socialization, Exploration, Clarifying, and Problem-solving      Discipline Responsible: Psychoeducational Specialist      Signature:  LALY Valles

## 2025-06-28 NOTE — PLAN OF CARE
Problem: Psychosis  Goal: Will report no hallucinations or delusions  Description: INTERVENTIONS:  1. Administer medication as  ordered  2. Assist with reality testing to support increasing orientation  3. Assess if patient's hallucinations or delusions are encouraging self harm or harm to others and intervene as appropriate  6/27/2025 2313 by Naomi Garcia, RN  Outcome: Not Progressing  6/27/2025 2313 by Naomi Garcia, RN  Outcome: Progressing

## 2025-06-29 LAB — GLUCOSE BLD-MCNC: 192 MG/DL (ref 74–99)

## 2025-06-29 PROCEDURE — 82962 GLUCOSE BLOOD TEST: CPT

## 2025-06-29 PROCEDURE — 6370000000 HC RX 637 (ALT 250 FOR IP): Performed by: PSYCHIATRY & NEUROLOGY

## 2025-06-29 PROCEDURE — 6370000000 HC RX 637 (ALT 250 FOR IP)

## 2025-06-29 PROCEDURE — 99232 SBSQ HOSP IP/OBS MODERATE 35: CPT

## 2025-06-29 PROCEDURE — 1240000000 HC EMOTIONAL WELLNESS R&B

## 2025-06-29 RX ADMIN — PROPRANOLOL HYDROCHLORIDE 60 MG: 60 CAPSULE, EXTENDED RELEASE ORAL at 09:14

## 2025-06-29 RX ADMIN — PIOGLITAZONE 30 MG: 15 TABLET ORAL at 09:13

## 2025-06-29 RX ADMIN — PANTOPRAZOLE SODIUM 40 MG: 40 TABLET, DELAYED RELEASE ORAL at 06:43

## 2025-06-29 RX ADMIN — GABAPENTIN 400 MG: 400 CAPSULE ORAL at 20:36

## 2025-06-29 RX ADMIN — HALOPERIDOL 5 MG: 5 TABLET ORAL at 20:36

## 2025-06-29 RX ADMIN — HYDROXYZINE HYDROCHLORIDE 50 MG: 50 TABLET ORAL at 20:35

## 2025-06-29 RX ADMIN — HALOPERIDOL 2 MG: 2 TABLET ORAL at 09:14

## 2025-06-29 RX ADMIN — BENZTROPINE MESYLATE 0.5 MG: 0.5 TABLET ORAL at 09:13

## 2025-06-29 RX ADMIN — DIVALPROEX SODIUM 500 MG: 500 TABLET, DELAYED RELEASE ORAL at 16:13

## 2025-06-29 RX ADMIN — LISINOPRIL 10 MG: 10 TABLET ORAL at 09:13

## 2025-06-29 RX ADMIN — Medication 5 MG: at 20:35

## 2025-06-29 RX ADMIN — DIVALPROEX SODIUM 750 MG: 500 TABLET, DELAYED RELEASE ORAL at 09:13

## 2025-06-29 RX ADMIN — ATORVASTATIN CALCIUM 40 MG: 40 TABLET, FILM COATED ORAL at 09:14

## 2025-06-29 RX ADMIN — CETIRIZINE HYDROCHLORIDE 10 MG: 10 TABLET, FILM COATED ORAL at 09:13

## 2025-06-29 RX ADMIN — BENZTROPINE MESYLATE 0.5 MG: 0.5 TABLET ORAL at 20:36

## 2025-06-29 RX ADMIN — TAMSULOSIN HYDROCHLORIDE 0.8 MG: 0.4 CAPSULE ORAL at 20:36

## 2025-06-29 RX ADMIN — DIVALPROEX SODIUM 1500 MG: 500 TABLET, DELAYED RELEASE ORAL at 20:35

## 2025-06-29 RX ADMIN — MONTELUKAST SODIUM 10 MG: 10 TABLET, FILM COATED ORAL at 20:36

## 2025-06-29 NOTE — PLAN OF CARE
Problem: Psychosis  Goal: Will report no hallucinations or delusions  Description: INTERVENTIONS:  1. Administer medication as  ordered  2. Assist with reality testing to support increasing orientation  3. Assess if patient's hallucinations or delusions are encouraging self harm or harm to others and intervene as appropriate  6/29/2025 0001 by Naomi Garcia, RN  Outcome: Progressing  6/28/2025 1237 by Adriana Camarena, RN  Outcome: Not Progressing     Problem: Anxiety  Goal: Will report anxiety at manageable levels  Description: INTERVENTIONS:  1. Administer medication as ordered  2. Teach and rehearse alternative coping skills  3. Provide emotional support with 1:1 interaction with staff  Outcome: Not Progressing

## 2025-06-29 NOTE — BH NOTE
Patient awake in day room, stays to self. Alert and oriented x 4.   Patient has fair eye contact. Patient presents as, flat,blunt,guarded  Appears well groomed  Patient denies suicidal/homicidal ideations and auditory/visual hallucinations.  Patient rates anxiety 2/10 about going home Monday. Denies depression, but observed flat,sad.  Patient denies pain  Verbalizes appetite is good and so is his sleep.  States that shakes from his nerves has slowed down. No longer having thoughts of harming others.  No unit behaviors observed or reported.  Patient is taking ordered medications without issue. Patient is attending select groups per note review.   Purposeful Q15 minute rounds continue.

## 2025-06-29 NOTE — BH NOTE
Patient resting quietly with eyes closed. No S/S of distress noted or observed. Prn medications Melatonin given at this time. Will continue to monitor, provide needs and safe environment with continue rounding every 15 minutes.

## 2025-06-29 NOTE — GROUP NOTE
Group Therapy Note    Date: 6/29/2025    Group Start Time: 1110  Group End Time: 1125  Group Topic: Community Meeting    [facility] 2    Rachel Ayoub CTRS    Group Therapy Note    Attendees: 9    Date: 6/29/2025  Start Time: 1110  End Time:  1125  Number of Participants: 9    Type of Group: Community Meeting    Patient's Goal:  Increased awareness of expectations of the milieu, daily staffing and programming. Identified goal for the day.     Notes:  Patient was an active listener in group. Patient shared goal for the day as, \"Be calm and collective.\"    Status After Intervention:  Improved    Participation Level: Active Listener and Interactive    Participation Quality: Appropriate, Attentive, and Sharing      Speech:  normal      Thought Process/Content: Logical  Linear      Affective Functioning: Congruent      Mood: Appropriate      Level of consciousness:  Alert and Attentive      Response to Learning: Able to verbalize current knowledge/experience, Able to verbalize/acknowledge new learning, Able to retain information, Capable of insight, Able to change behavior, and Progressing to goal      Endings: None Reported    Modes of Intervention: Education, Support, Socialization, Exploration, Clarifying, and Problem-solving      Discipline Responsible: Psychoeducational Specialist      Signature:  LALY Valles

## 2025-06-29 NOTE — PLAN OF CARE
Pt denies homicidal/suicidal thoughts. Denies hallucinations. Pt is calm and cooperative and brightens with conversation. Pt denies anxiety and depression. Pt is attending group therapy and compliant with medications. Will continue to monitor and provide support.  Problem: Self Harm/Suicidality  Goal: Will have no self-injury during hospital stay  Description: INTERVENTIONS:  1.  Ensure constant observer at bedside with Q15M safety checks  2.  Maintain a safe environment  3.  Secure patient belongings  4.  Ensure family/visitors adhere to safety recommendations  5.  Ensure safety tray has been added to patient's diet order  6.  Every shift and PRN: Re-assess suicidal risk via Frequent Screener    6/29/2025 0001 by Naomi Garcia RN  Outcome: Progressing     Problem: Depression  Goal: Will be euthymic at discharge  Description: INTERVENTIONS:  1. Administer medication as ordered  2. Provide emotional support via 1:1 interaction with staff  3. Encourage involvement in milieu/groups/activities  4. Monitor for social isolation  6/29/2025 0001 by Naomi Garcia RN  Outcome: Progressing     Problem: Shani  Goal: Will exhibit normal sleep and speech and no impulsivity  Description: INTERVENTIONS:  1. Administer medication as ordered  2. Set limits on impulsive behavior  3. Make attempts to decrease external stimuli as possible  Outcome: Progressing     Problem: Psychosis  Goal: Will report no hallucinations or delusions  Description: INTERVENTIONS:  1. Administer medication as  ordered  2. Assist with reality testing to support increasing orientation  3. Assess if patient's hallucinations or delusions are encouraging self harm or harm to others and intervene as appropriate  6/29/2025 1257 by Adriana Camarena RN  Outcome: Progressing  6/29/2025 0001 by Naomi Garcia RN  Outcome: Progressing     Problem: Behavior  Goal: Pt/Family maintain appropriate behavior and adhere to behavioral management agreement, if

## 2025-06-29 NOTE — GROUP NOTE
Group Therapy Note    Date: 6/29/2025    Group Start Time: 1005  Group End Time: 1100  Group Topic: Cognitive Skills    [facility] 1    Kayy Foy        Group Therapy Note    Attendees: 6       Patient's Goal:  Pt will find music that helps them focus and regulate their emotions.      Notes:  Pt participate in group and gave examples while being supportive to other group members.     Status After Intervention:  Improved    Participation Level: Active Listener and Interactive    Participation Quality: Appropriate, Attentive, and Sharing      Speech:  normal      Thought Process/Content: Logical      Affective Functioning: Congruent      Mood: euthymic      Level of consciousness:  Alert, Oriented x4, and Attentive      Response to Learning: Able to verbalize current knowledge/experience and Able to verbalize/acknowledge new learning      Endings: None Reported    Modes of Intervention: Education, Support, Socialization, Exploration, Clarifying, and Problem-solving      Discipline Responsible: /Counselor      Signature:  Kayy Foy

## 2025-06-30 VITALS
DIASTOLIC BLOOD PRESSURE: 77 MMHG | BODY MASS INDEX: 32.67 KG/M2 | OXYGEN SATURATION: 96 % | HEIGHT: 72 IN | SYSTOLIC BLOOD PRESSURE: 133 MMHG | WEIGHT: 241.2 LBS | RESPIRATION RATE: 17 BRPM | TEMPERATURE: 97.3 F | HEART RATE: 88 BPM

## 2025-06-30 LAB — GLUCOSE BLD-MCNC: 270 MG/DL (ref 74–99)

## 2025-06-30 PROCEDURE — 6370000000 HC RX 637 (ALT 250 FOR IP)

## 2025-06-30 PROCEDURE — 6370000000 HC RX 637 (ALT 250 FOR IP): Performed by: PSYCHIATRY & NEUROLOGY

## 2025-06-30 PROCEDURE — 99232 SBSQ HOSP IP/OBS MODERATE 35: CPT

## 2025-06-30 PROCEDURE — 82962 GLUCOSE BLOOD TEST: CPT

## 2025-06-30 RX ORDER — HALOPERIDOL 2 MG/1
2 TABLET ORAL DAILY
Qty: 30 TABLET | Refills: 0 | Status: SHIPPED | OUTPATIENT
Start: 2025-07-01 | End: 2025-07-31

## 2025-06-30 RX ORDER — HALOPERIDOL 5 MG/1
5 TABLET ORAL NIGHTLY
Qty: 30 TABLET | Refills: 0 | Status: SHIPPED | OUTPATIENT
Start: 2025-06-30 | End: 2025-07-30

## 2025-06-30 RX ORDER — DIVALPROEX SODIUM 250 MG/1
750 TABLET, DELAYED RELEASE ORAL EVERY MORNING
Qty: 90 TABLET | Refills: 0 | Status: SHIPPED | OUTPATIENT
Start: 2025-07-01 | End: 2025-07-31

## 2025-06-30 RX ORDER — DIVALPROEX SODIUM 500 MG/1
500 TABLET, DELAYED RELEASE ORAL
Qty: 30 TABLET | Refills: 0 | Status: SHIPPED | OUTPATIENT
Start: 2025-06-30 | End: 2025-07-30

## 2025-06-30 RX ORDER — DIVALPROEX SODIUM 500 MG/1
1500 TABLET, DELAYED RELEASE ORAL
Qty: 90 TABLET | Refills: 0 | Status: SHIPPED | OUTPATIENT
Start: 2025-06-30 | End: 2025-07-30

## 2025-06-30 RX ADMIN — PROPRANOLOL HYDROCHLORIDE 60 MG: 60 CAPSULE, EXTENDED RELEASE ORAL at 08:27

## 2025-06-30 RX ADMIN — PIOGLITAZONE 30 MG: 15 TABLET ORAL at 08:27

## 2025-06-30 RX ADMIN — PANTOPRAZOLE SODIUM 40 MG: 40 TABLET, DELAYED RELEASE ORAL at 06:17

## 2025-06-30 RX ADMIN — BENZTROPINE MESYLATE 0.5 MG: 0.5 TABLET ORAL at 08:27

## 2025-06-30 RX ADMIN — HALOPERIDOL 2 MG: 2 TABLET ORAL at 08:27

## 2025-06-30 RX ADMIN — ATORVASTATIN CALCIUM 40 MG: 40 TABLET, FILM COATED ORAL at 08:27

## 2025-06-30 RX ADMIN — DIVALPROEX SODIUM 750 MG: 500 TABLET, DELAYED RELEASE ORAL at 08:27

## 2025-06-30 RX ADMIN — CETIRIZINE HYDROCHLORIDE 10 MG: 10 TABLET, FILM COATED ORAL at 08:27

## 2025-06-30 RX ADMIN — LISINOPRIL 10 MG: 10 TABLET ORAL at 08:27

## 2025-06-30 NOTE — BH NOTE
[facility]  Week Interdisciplinary Treatment Plan Note     Review Date & Time: 06/30/25 0900    Patient was in treatment team.    Estimated Length of Stay Update:   week  Estimated Discharge Date Update: 6/30    EDUCATION:   Learner Progress Toward Treatment Goals: Reviewed results and recommendations of this team, Reviewed group plan and strategies, Reviewed signs, symptoms and risk of self harm and violent behavior, and Reviewed goals and plan of care    Method: Individual    Outcome: Verbalized understanding and Demonstrated Understanding    PATIENT GOALS:     PLAN/TREATMENT RECOMMENDATIONS UPDATE:     GOALS UPDATE:  Time frame for Short-Term Goals:       Adriana Camarena RN

## 2025-06-30 NOTE — CARE COORDINATION
Discharge:    Sw met with pt to discuss discharge. He stated that he is ready. He stated that he will return home and his sister will pick him up. He denied SI/HI/AVH.     Collateral:    Sw called pts legal guardian- Viral [phone] to inform him of discharge. Sw explained that she called [facility] to see if they have an IOP, they did not answer. He understood, he stated that he will reach out to his  in [facility]. He stated that pt also told him that his sister will pick him up.     Sw met with pt to obtain his sister's information. He stated that her name is Becky [phone] and we could also call sven- Evon [phone].     Sw called pts sister- Becky [phone] to confirm that she would be picking pt up. No answer, sw left a voicemail.     Kimberley called pts canelo Barnard [phone] to confirm that someone would be picking pt up. She stated that one of them will be picking him up.     Kimberley called [employer] in [place] [phone] to inform them of pts HI. Phone would ring and then ring busy. Sw attempted multiple times.     Kimberley called [place] [employer] and spoke with Anum who stated that we need to call the  department.    Kimberley called the 's Department [phone] and spoke Nury. She stated that they are aware of pt and will inform the [employer].     Transportation:    Pts family to pick pt up.    Appointments:    Pt has a med appointment 7/14.    In order to ensure appropriate transition and discharge planning is in place, the following documents have been transmitted to [facility], as the new outpatient provider:    The d/c diagnosis was transmitted to the next care provider  The reason for hospitalization was transmitted to the next care provider  The d/c medications (dosage and indication) were transmitted to the next care provider   The continuing care plan was transmitted to the next care provider

## 2025-06-30 NOTE — TRANSITION OF CARE
these medications      benztropine 0.5 MG tablet  Commonly known as: COGENTIN     Spiriva Respimat 2.5 MCG/ACT Aers inhaler  Generic drug: tiotropium     therapeutic multivitamin-minerals tablet               Where to Get Your Medications        These medications were sent to [facility] - [address] - P [phone] - F [phone]  [address] [place] [address] [facility] [address]      Phone: [phone]   divalproex 250 MG DR tablet  divalproex 500 MG DR tablet  divalproex 500 MG DR tablet  haloperidol 2 MG tablet  haloperidol 5 MG tablet         Unresulted Labs (24h ago, onward)      None            To obtain results of studies pending at discharge, please contact [phone]    Follow-up Information       Follow up With Specialties Details Why Contact Info    [facility]  Go on 7/14/2025 2:30 PM- mental health medication management appointment with Jerome on 7/14. [facility]    [address] [place], [place] [address]joanie. [place] [id number]     Phone: [phone]    Fax [phone]             Advanced Directive:   Does the patient have an appointed surrogate decision maker? Yes; Name of appointed surrogate decision maker: Viral Lenz (guardian) and Phone number of appointed surrogate decision maker: [phone]  Does the patient have a Medical Advance Directive? No  Does the patient have a Psychiatric Advance Directive? No  If the patient does not have a surrogate or Medical Advance Directive AND Psychiatric Advance Directive, the patient was offered information on these advance directives Patient will complete at a later time    Patient Instructions: Please continue all medications until otherwise directed by physician.      Tobacco Cessation Discharge Plan:   Is the patient a tobacco user  and needs referral for tobacco cessation? Yes  Patient referred to the following for tobacco cessation with an appointment? Yes  Follow up

## 2025-06-30 NOTE — DISCHARGE SUMMARY
DISCHARGE SUMMARY      Patient ID:  Juvenal Andre  [id number]  54 y.o.  1970    Admit date: 6/23/2025    Discharge date and time: 6/30/2025    Admitting Physician: Cassie Garland MD     Discharge Physician: Dr Gill LOPEZ    Discharge Diagnoses:   Patient Active Problem List   Diagnosis    Depression    Essential hypertension    Gastroesophageal reflux disease    Benign prostatic hyperplasia    Schizoaffective disorder, bipolar type (HCC)    Schizo affective schizophrenia (HCC)       Admission Condition: poor    Discharged Condition: stable    Admission Circumstance: Patient name: Juvenal Andre  Patient's past mental health and addiction history: History of schizoaffective disorder bipolar type with previous inpatient psychiatric hospitalizations   Patient's presentation to the ED and why the patient needs admission: Auditory hallucinations homicidal ideation toward male female who reside at the [facility] with patient  Legal status:  []  Voluntary  [x]  Involuntary  []  Probate  Triggering/precipitating events: Unknown  Duration of triggering/precipitating events: within the last several weeks      PAST MEDICAL/PSYCHIATRIC HISTORY:   Past Medical History:   Diagnosis Date    Anxiety     Arthritis     Hyperlipidemia     Hypertension     Impulse control disease     Schizo affective schizophrenia (HCC)     Seizures (HCC)        FAMILY/SOCIAL HISTORY:  Family History   Problem Relation Age of Onset    No Known Problems Mother     No Known Problems Sister      Social History     Socioeconomic History    Marital status: Single     Spouse name: Not on file    Number of children: Not on file    Years of education: Not on file    Highest education level: Not on file   Occupational History     Employer: NOT EMPLOYED   Tobacco Use    Smoking status: Every Day     Current packs/day: 1.50     Average packs/day: 1.5 packs/day for 25.4 years (38.0 ttl pk-yrs)     Types: Cigarettes     Start date: 2/18/2000    Smokeless

## 2025-06-30 NOTE — GROUP NOTE
Group Therapy Note    Date: 6/30/2025    Group Start Time: 1050  Group End Time: 1120  Group Topic: Psychoeducation    [facility] 2    Rachel Ayoub CTRS    Group Therapy Note    Attendees: 10    Date: 6/30/2025  Start Time: 1050  End Time:  1120  Number of Participants: 10    Type of Group: Psychoeducation    Name:  Finding Balance    Patient's Goal:  Identified what it means to be in balance, how balance affects mental health and ways to improve/maintain balance.    Notes:  CTRS led educational group discussion on balance. Encouraged patients to share their experiences. Patient was actively engaged in group discussion and made positive responses.    Status After Intervention:  Improved    Participation Level: Active Listener and Interactive    Participation Quality: Appropriate, Attentive, and Sharing      Speech:  normal      Thought Process/Content: Logical  Linear      Affective Functioning: Congruent      Mood: Appropriate      Level of consciousness:  Alert and Attentive      Response to Learning: Able to verbalize current knowledge/experience, Able to verbalize/acknowledge new learning, Able to retain information, Capable of insight, Able to change behavior, and Progressing to goal      Endings: None Reported    Modes of Intervention: Education, Support, Socialization, Exploration, Clarifying, and Problem-solving      Discipline Responsible: Psychoeducational Specialist      Signature:  LALY Valles

## 2025-06-30 NOTE — BH NOTE
Patient awake in day room upon approach. Alert and oriented x 4.  Patient has fair eye contact. Patient presents as flat and blunted.  Appears groomed.  Patient denies suicidal/homicidal ideations and auditory/visual hallucinations.  Patient rates anxiety 2/10 do to getting discharged tomorrow, just excited. Denies depression. Patient is observed slightly preoccupied.  Patient states that his appetite is good. States that he still gets up 2-3 times a night,that has always been a habit.  Patient denies pain  Patient verbalizes that  homicidal voices have stopped. No longer provoked . Thoughts have calmed down.States that he is really impressed how much better he feels.  No unit behaviors observed or reported.  Patient is taking ordered medications without issue. Patient is attending select groups per note review.   Purposeful Q15 minute rounds continue.

## 2025-06-30 NOTE — GROUP NOTE
Group Therapy Note    Date: 6/30/2025    Group Start Time: 1030  Group End Time: 1050  Group Topic: Community Meeting    [facility] 2    Rachel Ayoub CTRS    Group Therapy Note    Attendees: 10    Date: 6/30/2025  Start Time: 1030  End Time:  1050  Number of Participants: 10    Type of Group: Community Meeting    Patient's Goal:  Increased awareness of expectations of the milieu, daily staffing and programming. Identified goal for the day.    Notes:  Patient was an active listener in group. Patient shared goal for the day as, \"Get home.\"    Status After Intervention:  Improved    Participation Level: Active Listener and Interactive    Participation Quality: Appropriate, Attentive, and Sharing      Speech:  normal      Thought Process/Content: Logical  Linear      Affective Functioning: Congruent      Mood: Appropriate      Level of consciousness:  Alert and Attentive      Response to Learning: Able to verbalize current knowledge/experience, Able to verbalize/acknowledge new learning, Able to retain information, Capable of insight, Able to change behavior, and Progressing to goal      Endings: None Reported    Modes of Intervention: Education, Support, Socialization, Exploration, Clarifying, and Problem-solving      Discipline Responsible: Psychoeducational Specialist      Signature:  LALY Valles

## 2025-06-30 NOTE — PROGRESS NOTES
Per [facility] approved formulary policy.     SGLT2's are only formulary with the indication of CKD or CHF therefore:    Please note that the  Empagliflozin (Jardiance) is non-formulary with indications of type 2 diabetes and has been discontinued while inpatient.    Please contact the pharmacy with any questions or concerns.  Thank you.  Mahendra Rangel RPH,  6/24/2025 10:20 AM   
BEHAVIORAL HEALTH FOLLOW-UP NOTE     6/26/2025     Patient was seen and examined in person, Chart reviewed   Patient's case discussed with staff/team    Chief Complaint:  Auditory hallucinations homicidal ideation toward male female who reside at the [facility] with patient     Interim History:   Patient was seen on the unit sitting watching TV he is flat flat but does brighten he is calmer states he is sleeping and eating.  He states that he is sleeping better he reports that he feels more like himself.  He states been talking to his guardian and has a lot of support from him. He denies suicidal homicidal ideation intent or plan, denies auditory visual hallucinations.  He has been taking his medication, he has had no behavioral disturbances, he is attending group.  Sleep and appetite reported to be fair    Appetite: [x] Normal/Unchanged  [] Increased  [] Decreased      Sleep:       [x] Normal/Unchanged  [] Fair       [] Poor              Energy:    [x] Normal/Unchanged  [] Increased  [] Decreased        SI [] Present  [x] Absent    HI  []Present  [x] Absent     Aggression:  [] yes  [x] no    Patient is [x] able  [] unable to CONTRACT FOR SAFETY     PAST MEDICAL/PSYCHIATRIC HISTORY:   Past Medical History:   Diagnosis Date    Anxiety     Arthritis     Hyperlipidemia     Hypertension     Impulse control disease     Schizo affective schizophrenia (HCC)     Seizures (HCC)        FAMILY/SOCIAL HISTORY:  Family History   Problem Relation Age of Onset    No Known Problems Mother     No Known Problems Sister      Social History     Socioeconomic History    Marital status: Single     Spouse name: Not on file    Number of children: Not on file    Years of education: Not on file    Highest education level: Not on file   Occupational History     Employer: NOT EMPLOYED   Tobacco Use    Smoking status: Every Day     Current packs/day: 1.50     Average packs/day: 1.5 packs/day for 25.4 years (38.0 ttl pk-yrs)     Types: 
BEHAVIORAL HEALTH FOLLOW-UP NOTE     6/27/2025     Patient was seen and examined in person, Chart reviewed   Patient's case discussed with staff/team    Chief Complaint:  Auditory hallucinations homicidal ideation toward male female who reside at the [facility] with patient     Interim History:   Patient was seen today sitting on the unit watching television keeping to himself he is flat, blunted somewhat guarded.  Patient states he is feeling better he states that he talked to his guardian and his guardian also stated that he felt he was doing better.  Patient adamantly denies any homicidal ideation intent or plan he states that he does not feel concerned about the 2 people he was reporting homicidal thoughts toward.  He is agreeable for IOP which social work is attempting to set up.  He does denies suicidal homicidal ideation intent or plan, denies auditory visual hallucinations does appear somewhat preoccupied at times.  He has been taking his medication, he has had no behavioral disturbances, he is attending group.  Sleep and appetite reported to be fair    Appetite: [x] Normal/Unchanged  [] Increased  [] Decreased      Sleep:       [x] Normal/Unchanged  [] Fair       [] Poor              Energy:    [x] Normal/Unchanged  [] Increased  [] Decreased        SI [] Present  [x] Absent    HI  []Present  [x] Absent     Aggression:  [] yes  [x] no    Patient is [x] able  [] unable to CONTRACT FOR SAFETY     PAST MEDICAL/PSYCHIATRIC HISTORY:   Past Medical History:   Diagnosis Date    Anxiety     Arthritis     Hyperlipidemia     Hypertension     Impulse control disease     Schizo affective schizophrenia (HCC)     Seizures (HCC)        FAMILY/SOCIAL HISTORY:  Family History   Problem Relation Age of Onset    No Known Problems Mother     No Known Problems Sister      Social History     Socioeconomic History    Marital status: Single     Spouse name: Not on file    Number of children: Not on file    Years of education: 
BEHAVIORAL HEALTH FOLLOW-UP NOTE     6/29/2025     Patient was seen and examined in person, Chart reviewed   Patient's case discussed with staff/team    Chief Complaint:  Auditory hallucinations homicidal ideation toward male female who reside at the [facility] with patient     Interim History:   Patient was seen today out on the unit sitting at a table with peers he is flat and blunted but does brighten.  He states he is feeling better he states he feels he is back to himself and ready for discharge.  He states has been talking to his guardian.  He denies suicidal homicidal ideation intent or plan, denies auditory visual hallucinations.  He has been taking his medication, he has had no behavioral disturbances, he is attending group.  Sleep and appetite reported to be fair      Appetite: [x] Normal/Unchanged  [] Increased  [] Decreased      Sleep:       [x] Normal/Unchanged  [] Fair       [] Poor              Energy:    [x] Normal/Unchanged  [] Increased  [] Decreased        SI [] Present  [x] Absent    HI  []Present  [x] Absent     Aggression:  [] yes  [x] no    Patient is [x] able  [] unable to CONTRACT FOR SAFETY     PAST MEDICAL/PSYCHIATRIC HISTORY:   Past Medical History:   Diagnosis Date    Anxiety     Arthritis     Hyperlipidemia     Hypertension     Impulse control disease     Schizo affective schizophrenia (HCC)     Seizures (HCC)        FAMILY/SOCIAL HISTORY:  Family History   Problem Relation Age of Onset    No Known Problems Mother     No Known Problems Sister      Social History     Socioeconomic History    Marital status: Single     Spouse name: Not on file    Number of children: Not on file    Years of education: Not on file    Highest education level: Not on file   Occupational History     Employer: NOT EMPLOYED   Tobacco Use    Smoking status: Every Day     Current packs/day: 1.50     Average packs/day: 1.5 packs/day for 25.4 years (38.0 ttl pk-yrs)     Types: Cigarettes     Start date: 2/18/2000 
[facility]  Initial Interdisciplinary Treatment Plan NOTE    Review Date & Time: 6/24/2025 0900    Patient was in treatment team    Admission Type:   Admission Type: Involuntary    Reason for admission:  Reason for Admission: \"I've been having issues on the outside\"      Estimated Length of Stay Update:  3-5 days  Estimated Discharge Date Update: 6/29/2025    EDUCATION:   Learner Progress Toward Treatment Goals: Reviewed results and recommendations of this team, Reviewed group plan and strategies, Reviewed signs, symptoms and risk of self harm and violent behavior, and Reviewed goals and plan of care    Method: Small group    Outcome: Verbalized understanding    PATIENT GOALS: None verbalized at this time.     PLAN/TREATMENT RECOMMENDATIONS UPDATE:Encourage patient to attend and participate in groups and take medications as prescribed.       GOALS UPDATE:   Time frame for Short-Term Goals: Prior to discharge    Ami Navarro RN        
[facility]  Day 3 Interdisciplinary Treatment Plan NOTE    Review Date & Time: 6/26/25 1000    Patient was in treatment team    Estimated Length of Stay Update:  3-5 days  Estimated Discharge Date Update: 7/1/2025    EDUCATION:   Learner Progress Toward Treatment Goals: Reviewed results and recommendations of this team, Reviewed group plan and strategies, Reviewed signs, symptoms and risk of self harm and violent behavior, and Reviewed goals and plan of care    Method: Small group    Outcome: Verbalized understanding and Demonstrated Understanding    PATIENT GOALS: None verbalized at this time.     PLAN/TREATMENT RECOMMENDATIONS UPDATE:Encourage patient to attend and participate in groups and take medications as prescribed.       GOALS UPDATE:   Time frame for Short-Term Goals: Prior to discharge      Ami Navarro RN    
CLINICAL PHARMACY NOTE: MEDS TO BEDS    Total # of Prescriptions Filled: 4   The following medications were delivered to the patient:  Divalproex  mg  Divalproex  mg  Haloperidol 5 mg  Haloperidol 1 mg    Additional Documentation: MIRIAM Mckay picked up in pharmacy    
Juvenal Baptisteight was ordered Trulicity which is a nonformulary medication.  This medication will need to be supplied by the patient as the pharmacy does not carry this non-formulary medication.    If the medication has not been administered by 1400 on the following day from the time the order was placed, a pharmacist will follow-up with the nurse of the patient to assess the capability of the patient to bring in the medication.    If it is determined that the patient cannot supply the medication and it is not available to be dispensed from the pharmacy, the provider will be notified.    
Patient attended a spiritual care group session facilitated by  services. The session centered on the theme of \"Naomi in Action: Taking Steps Toward Consistency and Growth\". Patient was encouraged  to explore the connection between personal naomi, intentional goal setting, and sustainable behavorial change. The discussion focussed on identifying small, realistic steps that align with personal values and promote spiritual, emotional and personal growth. Emphasis was placed on consistency and follow-through as expressions of active naomi. Patient was engaged, participated appropriately, and expressed interest in developing consistent habits that support their recovery and overall well-being.         Chaplain Velma Kaur  
Patient denies SI/HI/AVH. Endorses anxiety and depression have improved. Denies racing thoughts. No paranoia or delusions reported or observed. Appears flat, blunt, and anxious during assessment. Brightens on conversation. Patient is discharge focused. Conversation is appropriate. Thoughts are linear. No voiced delusions. Reports appetite and sleep are good. Patient is taking medications without issues and is attending groups. Remains in control of behaviors.       
Patient's guardian, Viral Lenz, called reporting he just spoke to the patient via telephone and feels the patient is stable for discharge either tomorrow or over the weekend. NP and SW notified.   
Received call from Liana at [facility] who verified patient last received Aristada 882 mg IM on 6/18/2025. Per Liana, patient received MENDOZA every 4 weeks with next dose due 7/16/2025. Liana also clarified patient's Depakote dose and frequency. Per Liana, patient is prescribed Depakote 750 mg PO every morning, Depakote 500 mg PO every afternoon, and Depakote 1500 mg PO at bedtime. NP aware. New order to modify patient's Depakote dose to clarified home dosing schedule.   
Spiritual Support Group Note    Number of Participants in Group:     10                  Time: 3 pm    Goal: Relief from isolation and loneliness             Naomi Sharing             Self-understanding and gain insight              Acceptance and belonging            Recognize they are not alone                Socialization             Empowerment       Encouragement    Topic:  [] Spiritual Wellness and Self Care                  [] Hope                     [] Connecting with Divine/Others        [] Thankfulness and Gratitude               []  Meaningfulness and Purpose               [] Forgiveness               [] Peace               [] Connect to Community      [x] Other: Focused on naomi, setting goals, consistency and sustainability   Participation Level:   [x] Active Listener   [] Minimal   [] Monopolizing   [] Interactive   [] No Participation   []  Other:     Attention:   [x] Alert   [] Distractible   [] Drowsy   [] Poor   [] Other:    Manner:   [x] Cooperative   [] Suspicious   [] Withdrawn   [] Guarded   [] Irritable   [] Inhospitable   [] Other:     Others Comments from Group:   
mg, 650 mg, Oral, Q6H PRN, Cassie Garland MD, 650 mg at 06/25/25 2146    magnesium hydroxide (MILK OF MAGNESIA) 400 MG/5ML suspension 30 mL, 30 mL, Oral, Daily PRN, Cassie Garland MD    nicotine (NICODERM CQ) 21 MG/24HR 1 patch, 1 patch, TransDERmal, Daily, Cassie Garland MD, 1 patch at 06/28/25 0958    aluminum & magnesium hydroxide-simethicone (MAALOX PLUS) 200-200-20 MG/5ML suspension 30 mL, 30 mL, Oral, PRN, Cassie Garland MD    hydrOXYzine HCl (ATARAX) tablet 50 mg, 50 mg, Oral, TID PRN, Cassie Garland MD    haloperidol (HALDOL) tablet 5 mg, 5 mg, Oral, Q6H PRN, 5 mg at 06/25/25 1126 **OR** haloperidol lactate (HALDOL) injection 5 mg, 5 mg, IntraMUSCular, Q6H PRN, Cassie Garland MD    melatonin disintegrating tablet 5 mg, 5 mg, Oral, Nightly PRN, Cassie Garland MD, 5 mg at 06/27/25 2226      Examination:  /62   Pulse 80   Temp 97.8 °F (36.6 °C) (Temporal)   Resp 16   Ht 1.829 m (6')   Wt 109.4 kg (241 lb 3.2 oz)   SpO2 96%   BMI 32.71 kg/m²   Gait - steady  Medication side effects(SE):     Mental Status Examination:    Level of consciousness:  within normal limits   Appearance:  fair grooming and fair hygiene  Behavior/Motor:  no abnormalities noted  Attitude toward examiner:  cooperative  Speech:  spontaneous, normal rate, and normal volume   Mood: : \"I am okay\"  Affect:  mood congruent and flat but does brighten slightly   Thought processes:  linear   Thought content: Devoid of auditory visual hallucinations, delusions or other perceptual abnormalities.  Denies suicidal and homicidal ideation intent or plan  Language: intact  Remote Memory: intact   Recent Memory: intact   Cognition:  oriented to person, place, and time   Fund of Knowledge:  Attention:  Concentration intact  Insight limited   Judgement limited       ASSESSMENT: Patient symptoms are:  [] Well controlled  [x] Improving  [] Worsening  [] No change      Diagnosis:  Principal Problem:    Schizoaffective 
Supportive  [] CBT  [] Ongoing  [] Other    [x] Patient continues to need, on a daily basis, active treatment furnished directly by or requiring the supervision of inpatient psychiatric personnel      Anticipated Length of stay: 3 to 5 days based on stability         NOTE: This report was transcribed using voice recognition software. Every effort was made to ensure accuracy; however, inadvertent computerized transcription errors may be present.     Electronically signed by GRISELDA Bundy CNP on 6/25/2025 at 12:58 PM

## 2025-06-30 NOTE — CARE COORDINATION
Sw called [facility] [phone] to follow up. They stated that when pt was set up last time, they talked to pts legal guardian and he told them to play into pts delusions. They stated that pt is not appropriate for their groups and they cannot take him.

## 2025-06-30 NOTE — DISCHARGE SUMMARY
[facility]  Discharge Note    Pt discharged with followings belongings:   Dental Appliances: None  Vision - Corrective Lenses: Eyeglasses  Hearing Aid: None  Jewelry: None  Body Piercings Removed: N/A  Clothing: Undergarments, Shirt, Socks, Pajamas, Pants, Slippers, Shorts, Footwear (7xshirts,4x pants)  Other Valuables: Other (Comment) (2x Books, hygiune products)   Valuables sent home with or returned to patient. Patient educated on aftercare instructions: yes  Information faxed to n/a by n/a  at 1:08 PM .Patient verbalize understanding of AVS:  yes.    Status EXAM upon discharge:  Mental Status and Behavioral Exam  Normal: No  Level of Assistance: Independent/Self  Facial Expression: Flat  Affect: Blunt  Level of Consciousness: Alert  Frequency of Checks: 4 times per hour, close  Mood:Normal: No  Mood: Anxious  Motor Activity:Normal: Yes  Motor Activity: Other (comment)  Eye Contact: Good  Observed Behavior: Friendly, Cooperative  Sexual Misconduct History: Current - no  Preception: [place] to person, [place] to time, [place] to situation, [place] to place  Attention:Normal: Yes  Attention: Others (comment)  Thought Processes: Unremarkable  Thought Content:Normal: Yes  Thought Content: Other (comment)  Depression Symptoms: No problems reported or observed.  Anxiety Symptoms: Generalized  Shani Symptoms: No problems reported or observed.  Hallucinations: None  Delusions: No  Delusions: Other (comment)  Memory:Normal: No  Memory: Poor recent  Insight and Judgment: No  Insight and Judgment: Poor judgment, Poor insight    Tobacco Screening:  Practical Counseling, on admission, ross X, if applicable and completed (first 3 are required if patient doesn't refuse):            ( ) Recognizing danger situations (included triggers and roadblocks)                    ( ) Coping skills (new ways to manage stress,relaxation techniques, changing routine, distraction)

## 2025-06-30 NOTE — DISCHARGE SUMMARY
[facility]  Discharge Note    Pt discharged with followings belongings:   Dental Appliances: None  Vision - Corrective Lenses: Eyeglasses  Hearing Aid: None  Jewelry: None  Body Piercings Removed: N/A  Clothing: Undergarments, Shirt, Socks, Pajamas, Pants, Slippers, Shorts, Footwear (7xshirts,4x pants)  Other Valuables: Other (Comment) (2x Books, hygiune products)   Valuables sent home with or returned to patient. Patient educated on aftercare instructions: yes  Information faxed to n/a by n/a  at 1:19 PM .Patient verbalize understanding of AVS:  yes.    Status EXAM upon discharge:  Mental Status and Behavioral Exam  Normal: No  Level of Assistance: Independent/Self  Facial Expression: Flat  Affect: Blunt  Level of Consciousness: Alert  Frequency of Checks: 4 times per hour, close  Mood:Normal: No  Mood: Anxious  Motor Activity:Normal: Yes  Motor Activity: Other (comment)  Eye Contact: Good  Observed Behavior: Friendly, Cooperative  Sexual Misconduct History: Current - no  Preception: [place] to person, [place] to time, [place] to situation, [place] to place  Attention:Normal: Yes  Attention: Others (comment)  Thought Processes: Unremarkable  Thought Content:Normal: Yes  Thought Content: Other (comment)  Depression Symptoms: No problems reported or observed.  Anxiety Symptoms: Generalized  Shani Symptoms: No problems reported or observed.  Hallucinations: None  Delusions: No  Delusions: Other (comment)  Memory:Normal: No  Memory: Poor recent  Insight and Judgment: No  Insight and Judgment: Poor judgment, Poor insight    Tobacco Screening:  Practical Counseling, on admission, ross X, if applicable and completed (first 3 are required if patient doesn't refuse):            ( ) Recognizing danger situations (included triggers and roadblocks)                    ( ) Coping skills (new ways to manage stress,relaxation techniques, changing routine, distraction)

## 2025-06-30 NOTE — PLAN OF CARE
Pt denies homicidal/suicidal thoughts.denies hallucinations. Pt is compliant with medications and attending group therapy. Pt is calm and cooperative and interact with select peers on the unit. Denies anxiety and depression. Will continue to monitor and provide and support.   Problem: Self Harm/Suicidality  Goal: Will have no self-injury during hospital stay  Description: INTERVENTIONS:  1.  Ensure constant observer at bedside with Q15M safety checks  2.  Maintain a safe environment  3.  Secure patient belongings  4.  Ensure family/visitors adhere to safety recommendations  5.  Ensure safety tray has been added to patient's diet order  6.  Every shift and PRN: Re-assess suicidal risk via Frequent Screener    6/30/2025 1126 by Adriana Camarena RN  Outcome: Progressing  6/30/2025 0010 by Naomi Garcia RN  Outcome: Progressing     Problem: Depression  Goal: Will be euthymic at discharge  Description: INTERVENTIONS:  1. Administer medication as ordered  2. Provide emotional support via 1:1 interaction with staff  3. Encourage involvement in milieu/groups/activities  4. Monitor for social isolation  6/30/2025 1126 by Adriana Camarena RN  Outcome: Progressing  6/30/2025 0010 by Naomi Garcia RN  Outcome: Progressing     Problem: Shani  Goal: Will exhibit normal sleep and speech and no impulsivity  Description: INTERVENTIONS:  1. Administer medication as ordered  2. Set limits on impulsive behavior  3. Make attempts to decrease external stimuli as possible  6/30/2025 1126 by Adriana Camarena RN  Outcome: Progressing  6/30/2025 0010 by Naomi Garcia RN  Outcome: Progressing     Problem: Psychosis  Goal: Will report no hallucinations or delusions  Description: INTERVENTIONS:  1. Administer medication as  ordered  2. Assist with reality testing to support increasing orientation  3. Assess if patient's hallucinations or delusions are encouraging self harm or harm to others and intervene as

## 2025-07-01 NOTE — CARE COORDINATION
SW received a call from the pt (private number) stating someone just tried calling him. SW completed the hospital discharge follow up call with the pt. Pt stated he is doing well but he had a question for the RN so the call was transferred to the nurses station.

## 2025-07-07 ENCOUNTER — CASE MANAGEMENT (OUTPATIENT)
Dept: PSYCHIATRY | Age: 55
End: 2025-07-07

## 2025-07-07 NOTE — PROGRESS NOTES
KIARA received call from [facility] requesting discharge paperwork be faxed to them. Per chart, pt was referred to them during recent admission. KIARA faxed discharge paperwork.

## 2025-08-21 LAB
ALBUMIN SERPL-MCNC: 4.2 G/DL (ref 3.5–5.2)
ALP SERPL-CCNC: 74 U/L (ref 40–129)
ALT SERPL-CCNC: 14 U/L (ref 0–50)
ANION GAP SERPL CALCULATED.3IONS-SCNC: 12 MMOL/L (ref 7–16)
AST SERPL-CCNC: 20 U/L (ref 0–50)
BASOPHILS # BLD: 0.05 K/UL (ref 0–0.2)
BASOPHILS NFR BLD: 1 % (ref 0–2)
BILIRUB SERPL-MCNC: 0.3 MG/DL (ref 0–1.2)
BUN SERPL-MCNC: 12 MG/DL (ref 6–20)
CALCIUM SERPL-MCNC: 10 MG/DL (ref 8.6–10)
CHLORIDE SERPL-SCNC: 101 MMOL/L (ref 98–107)
CO2 SERPL-SCNC: 25 MMOL/L (ref 22–29)
CREAT SERPL-MCNC: 0.6 MG/DL (ref 0.7–1.2)
DATE LAST DOSE: 0
EOSINOPHIL # BLD: 0.16 K/UL (ref 0.05–0.5)
EOSINOPHILS RELATIVE PERCENT: 2 % (ref 0–6)
ERYTHROCYTE [DISTWIDTH] IN BLOOD BY AUTOMATED COUNT: 13.9 % (ref 11.5–15)
GFR, ESTIMATED: >90 ML/MIN/1.73M2
GLUCOSE SERPL-MCNC: 115 MG/DL (ref 74–99)
HCT VFR BLD AUTO: 42.9 % (ref 37–54)
HGB BLD-MCNC: 13.9 G/DL (ref 12.5–16.5)
IMM GRANULOCYTES # BLD AUTO: 0.05 K/UL (ref 0–0.58)
IMM GRANULOCYTES NFR BLD: 1 % (ref 0–5)
LYMPHOCYTES NFR BLD: 3.64 K/UL (ref 1.5–4)
LYMPHOCYTES RELATIVE PERCENT: 34 % (ref 20–42)
MCH RBC QN AUTO: 31.7 PG (ref 26–35)
MCHC RBC AUTO-ENTMCNC: 32.4 G/DL (ref 32–34.5)
MCV RBC AUTO: 97.7 FL (ref 80–99.9)
MONOCYTES NFR BLD: 0.78 K/UL (ref 0.1–0.95)
MONOCYTES NFR BLD: 7 % (ref 2–12)
NEUTROPHILS NFR BLD: 56 % (ref 43–80)
NEUTS SEG NFR BLD: 5.9 K/UL (ref 1.8–7.3)
PLATELET # BLD AUTO: 223 K/UL (ref 130–450)
PMV BLD AUTO: 12 FL (ref 7–12)
POTASSIUM SERPL-SCNC: 4.7 MMOL/L (ref 3.5–5.1)
PROT SERPL-MCNC: 6.9 G/DL (ref 6.4–8.3)
RBC # BLD AUTO: 4.39 M/UL (ref 3.8–5.8)
SODIUM SERPL-SCNC: 137 MMOL/L (ref 136–145)
TME LAST DOSE: 0 H
VALPROATE SERPL-MCNC: 61 UG/ML (ref 50–100)
VANCOMYCIN DOSE: 0 MG
WBC OTHER # BLD: 10.6 K/UL (ref 4.5–11.5)